# Patient Record
Sex: MALE | Race: ASIAN
[De-identification: names, ages, dates, MRNs, and addresses within clinical notes are randomized per-mention and may not be internally consistent; named-entity substitution may affect disease eponyms.]

---

## 2019-12-16 ENCOUNTER — APPOINTMENT (OUTPATIENT)
Dept: CARDIOLOGY | Facility: CLINIC | Age: 74
End: 2019-12-16
Payer: MEDICARE

## 2019-12-16 ENCOUNTER — NON-APPOINTMENT (OUTPATIENT)
Age: 74
End: 2019-12-16

## 2019-12-16 VITALS
HEART RATE: 97 BPM | RESPIRATION RATE: 17 BRPM | WEIGHT: 150 LBS | DIASTOLIC BLOOD PRESSURE: 82 MMHG | OXYGEN SATURATION: 95 % | SYSTOLIC BLOOD PRESSURE: 146 MMHG | TEMPERATURE: 98.7 F | BODY MASS INDEX: 25.61 KG/M2 | HEIGHT: 64 IN

## 2019-12-16 DIAGNOSIS — E11.9 TYPE 2 DIABETES MELLITUS W/OUT COMPLICATIONS: ICD-10-CM

## 2019-12-16 DIAGNOSIS — Z86.39 PERSONAL HISTORY OF OTHER ENDOCRINE, NUTRITIONAL AND METABOLIC DISEASE: ICD-10-CM

## 2019-12-16 DIAGNOSIS — Z01.810 ENCOUNTER FOR PREPROCEDURAL CARDIOVASCULAR EXAMINATION: ICD-10-CM

## 2019-12-16 PROCEDURE — 93306 TTE W/DOPPLER COMPLETE: CPT

## 2019-12-16 PROCEDURE — 93015 CV STRESS TEST SUPVJ I&R: CPT

## 2019-12-16 PROCEDURE — 99203 OFFICE O/P NEW LOW 30 MIN: CPT | Mod: 25

## 2019-12-16 PROCEDURE — 93000 ELECTROCARDIOGRAM COMPLETE: CPT | Mod: 59

## 2019-12-17 PROBLEM — E11.9 DM (DIABETES MELLITUS): Status: ACTIVE | Noted: 2019-12-17

## 2019-12-17 PROBLEM — Z86.39 HISTORY OF HYPERLIPIDEMIA: Status: RESOLVED | Noted: 2019-12-17 | Resolved: 2019-12-17

## 2019-12-17 PROBLEM — Z01.810 PRE-OPERATIVE CARDIOVASCULAR EXAMINATION: Status: ACTIVE | Noted: 2019-12-17

## 2019-12-17 RX ORDER — ATORVASTATIN CALCIUM 10 MG/1
10 TABLET, FILM COATED ORAL
Refills: 0 | Status: ACTIVE | COMMUNITY

## 2019-12-17 RX ORDER — ICOSAPENT ETHYL 1000 MG/1
1 CAPSULE ORAL
Refills: 0 | Status: ACTIVE | COMMUNITY

## 2019-12-17 RX ORDER — ASPIRIN ENTERIC COATED TABLETS 81 MG 81 MG/1
81 TABLET, DELAYED RELEASE ORAL
Refills: 0 | Status: ACTIVE | COMMUNITY

## 2019-12-17 RX ORDER — METFORMIN ER 500 MG 500 MG/1
500 TABLET ORAL
Refills: 0 | Status: ACTIVE | COMMUNITY

## 2019-12-17 NOTE — DISCUSSION/SUMMARY
[FreeTextEntry1] : 74 year-old male with DM presents for cardiac clearance prior to left hip replacement.  Patient reports no cardiac history.  Patient denies CP or SOB.  Patient reports occasional palpitations when anxious.  Patient reports h/o syncope in the distant past when he drank alcohol.\par \par (1) Cardiac clearance prior to left hip replacement - Patient reports no cardiac history.  Patient denies exertional symptoms.  Patient underwent an echocardiogram and it showed normal LV function without significant valvular pathology.  Patient underwent a treadmill stress test and completed 3 minutes of Taye protocol.  There was no ECG evidence of ischemia.  Patient is therefore cleared for surgery and anesthesia.  He may hold ASA 81 mg for 7 days prior to surgery.\par \par (2) Followup - as needed.

## 2019-12-17 NOTE — PHYSICAL EXAM
[General Appearance - Well Developed] : well developed [Normal Appearance] : normal appearance [Well Groomed] : well groomed [No Deformities] : no deformities [General Appearance - Well Nourished] : well nourished [General Appearance - In No Acute Distress] : no acute distress [Normal Conjunctiva] : the conjunctiva exhibited no abnormalities [Eyelids - No Xanthelasma] : the eyelids demonstrated no xanthelasmas [No Oral Pallor] : no oral pallor [Normal Oral Mucosa] : normal oral mucosa [No Oral Cyanosis] : no oral cyanosis [Normal Jugular Venous V Waves Present] : normal jugular venous V waves present [Normal Jugular Venous A Waves Present] : normal jugular venous A waves present [Heart Rate And Rhythm] : heart rate and rhythm were normal [No Jugular Venous Ventura A Waves] : no jugular venous ventura A waves [Murmurs] : no murmurs present [Heart Sounds] : normal S1 and S2 [Arterial Pulses Normal] : the arterial pulses were normal [Edema] : no peripheral edema present [Exaggerated Use Of Accessory Muscles For Inspiration] : no accessory muscle use [Respiration, Rhythm And Depth] : normal respiratory rhythm and effort [Auscultation Breath Sounds / Voice Sounds] : lungs were clear to auscultation bilaterally [Abdomen Soft] : soft [Abdomen Tenderness] : non-tender [Abdomen Mass (___ Cm)] : no abdominal mass palpated [Gait - Sufficient For Exercise Testing] : the gait was sufficient for exercise testing [Abnormal Walk] : normal gait [Cyanosis, Localized] : no localized cyanosis [Nail Clubbing] : no clubbing of the fingernails [] : no ischemic changes [Petechial Hemorrhages (___cm)] : no petechial hemorrhages [Oriented To Time, Place, And Person] : oriented to person, place, and time [Affect] : the affect was normal [Mood] : the mood was normal [No Anxiety] : not feeling anxious

## 2019-12-17 NOTE — HISTORY OF PRESENT ILLNESS
[FreeTextEntry1] : 74 year-old male with DM presents for cardiac clearance prior to left hip replacement.  Patient reports no cardiac history.  Patient denies CP or SOB.  Patient reports occasional palpitations when anxious.  Patient reports h/o syncope in the distant past when he drank alcohol.

## 2019-12-17 NOTE — REASON FOR VISIT
[Consultation] : a consultation regarding [FreeTextEntry1] : cardiac clearance prior to left hip replacement

## 2021-02-08 ENCOUNTER — APPOINTMENT (OUTPATIENT)
Dept: CARDIOLOGY | Facility: CLINIC | Age: 76
End: 2021-02-08
Payer: MEDICARE

## 2021-02-08 VITALS
OXYGEN SATURATION: 97 % | BODY MASS INDEX: 25.75 KG/M2 | RESPIRATION RATE: 18 BRPM | HEART RATE: 108 BPM | SYSTOLIC BLOOD PRESSURE: 129 MMHG | WEIGHT: 150 LBS | TEMPERATURE: 98.4 F | DIASTOLIC BLOOD PRESSURE: 85 MMHG

## 2021-02-08 DIAGNOSIS — R06.02 SHORTNESS OF BREATH: ICD-10-CM

## 2021-02-08 DIAGNOSIS — R00.2 PALPITATIONS: ICD-10-CM

## 2021-02-08 PROCEDURE — 99214 OFFICE O/P EST MOD 30 MIN: CPT

## 2021-02-08 PROCEDURE — 99072 ADDL SUPL MATRL&STAF TM PHE: CPT

## 2021-02-08 NOTE — PHYSICAL EXAM
[General Appearance - Well Developed] : well developed [Normal Appearance] : normal appearance [Well Groomed] : well groomed [General Appearance - Well Nourished] : well nourished [No Deformities] : no deformities [Normal Conjunctiva] : the conjunctiva exhibited no abnormalities [General Appearance - In No Acute Distress] : no acute distress [Eyelids - No Xanthelasma] : the eyelids demonstrated no xanthelasmas [Normal Oral Mucosa] : normal oral mucosa [No Oral Pallor] : no oral pallor [No Oral Cyanosis] : no oral cyanosis [Normal Jugular Venous A Waves Present] : normal jugular venous A waves present [Normal Jugular Venous V Waves Present] : normal jugular venous V waves present [No Jugular Venous Ventura A Waves] : no jugular venous ventura A waves [Respiration, Rhythm And Depth] : normal respiratory rhythm and effort [Exaggerated Use Of Accessory Muscles For Inspiration] : no accessory muscle use [Auscultation Breath Sounds / Voice Sounds] : lungs were clear to auscultation bilaterally [Heart Rate And Rhythm] : heart rate and rhythm were normal [Heart Sounds] : normal S1 and S2 [Murmurs] : no murmurs present [Arterial Pulses Normal] : the arterial pulses were normal [Edema] : no peripheral edema present [Abdomen Soft] : soft [Abdomen Tenderness] : non-tender [Abdomen Mass (___ Cm)] : no abdominal mass palpated [Abnormal Walk] : normal gait [Gait - Sufficient For Exercise Testing] : the gait was sufficient for exercise testing [Nail Clubbing] : no clubbing of the fingernails [Cyanosis, Localized] : no localized cyanosis [Petechial Hemorrhages (___cm)] : no petechial hemorrhages [] : no ischemic changes [Oriented To Time, Place, And Person] : oriented to person, place, and time [Affect] : the affect was normal [Mood] : the mood was normal [No Anxiety] : not feeling anxious

## 2021-02-08 NOTE — DISCUSSION/SUMMARY
[FreeTextEntry1] : 75 year-old male with DM presents for followup.  \par \par Patient was last seen on 12/16/19 for cardiac clearance prior to left hip replacement.  Patient underwent an echocardiogram and it showed normal LV function without significant valvular pathology.  Patient underwent a treadmill stress test and completed 3 minutes of Taye protocol.  There was no ECG evidence of ischemia. \par \par 2/8/21 - Patient reports that he has been experiencing dizziness in the mornings, described as spinning sensation like motion sickness.  Patient denies CP.  Patient reports NICOLE.  Patient reports occasional palpitations.  His ECG with PCP was interpreted by the machine as "atrial flutter" but it is actually sinus rhythm.  I advised patient to wear a Holter monitor for evaluation of his palpitations.  His dizziness is likely due to vestibular dysfunction.\par \par (1) Dizziness -  His ECG with PCP was interpreted by the machine as "atrial flutter" but it is actually sinus rhythm.  Patient was reassured.  His symptom s likely due to vestibular dysfunction. \par \par (2) Palpitations - I advised patient to wear a Holter monitor. \par \par (3) Followup - pending Holter.

## 2021-02-08 NOTE — HISTORY OF PRESENT ILLNESS
[FreeTextEntry1] : 75 year-old male with DM presents for followup.  \par \par Patient was last seen on 12/16/19 for cardiac clearance prior to left hip replacement.  Patient underwent an echocardiogram and it showed normal LV function without significant valvular pathology.  Patient underwent a treadmill stress test and completed 3 minutes of Taye protocol.  There was no ECG evidence of ischemia. \par \par 2/8/21 - Patient reports that he has been experiencing dizziness in the mornings, described as spinning sensation like motion sickness.  Patient denies CP.  Patient reports NICOLE.  Patient reports occasional palpitations.  Her ECG with PCP was interpreted by the machine as "atrial flutter" but it is actually sinus rhythm.  I advised patient to wear a Holter monitor for evaluation of his palpitations.  His dizziness is likely due to vestibular dysfunction.\par

## 2021-02-08 NOTE — REASON FOR VISIT
[Follow-Up - Clinic] : a clinic follow-up of [FreeTextEntry1] : 2/8/21 - Patient reports that he has been experiencing dizziness in the mornings, described as spinning sensation like motion sickness.  Patient denies CP.  Patient reports NICOLE.  Patient reports occasional palpitations.  His ECG with PCP was interpreted by the machine as "atrial flutter" but it is actually sinus rhythm.  I advised patient to wear a Holter monitor for evaluation of his palpitations.  His dizziness is likely due to vestibular dysfunction.\par \par 12/16/19 - Patient reports no cardiac history.  Patient denies CP or SOB.  Patient reports occasional palpitations when anxious.  Patient reports h/o syncope in the distant past when he drank alcohol.

## 2021-02-12 ENCOUNTER — NON-APPOINTMENT (OUTPATIENT)
Age: 76
End: 2021-02-12

## 2021-02-27 PROBLEM — R06.02 SOB (SHORTNESS OF BREATH): Status: ACTIVE | Noted: 2019-12-16

## 2023-04-06 ENCOUNTER — NON-APPOINTMENT (OUTPATIENT)
Age: 78
End: 2023-04-06

## 2023-05-16 ENCOUNTER — INPATIENT (INPATIENT)
Facility: HOSPITAL | Age: 78
LOS: 20 days | Discharge: NURSING, REHAB OR LONG-TERM CARE | DRG: 207 | End: 2023-06-06
Attending: SURGERY | Admitting: SURGERY
Payer: MEDICARE

## 2023-05-16 VITALS — TEMPERATURE: 98 F | RESPIRATION RATE: 20 BRPM | HEART RATE: 83 BPM | OXYGEN SATURATION: 100 %

## 2023-05-16 DIAGNOSIS — I21.3 ST ELEVATION (STEMI) MYOCARDIAL INFARCTION OF UNSPECIFIED SITE: ICD-10-CM

## 2023-05-16 LAB
ALBUMIN SERPL ELPH-MCNC: 3.1 G/DL — LOW (ref 3.3–5)
ALP SERPL-CCNC: 105 U/L — SIGNIFICANT CHANGE UP (ref 40–120)
ALT FLD-CCNC: 25 U/L — SIGNIFICANT CHANGE UP (ref 12–78)
ANION GAP SERPL CALC-SCNC: 4 MMOL/L — LOW (ref 5–17)
APPEARANCE UR: CLEAR — SIGNIFICANT CHANGE UP
APTT BLD: 55.4 SEC — HIGH (ref 27.5–35.5)
AST SERPL-CCNC: 37 U/L — SIGNIFICANT CHANGE UP (ref 15–37)
BACTERIA # UR AUTO: ABNORMAL
BASOPHILS # BLD AUTO: 0.08 K/UL — SIGNIFICANT CHANGE UP (ref 0–0.2)
BASOPHILS NFR BLD AUTO: 0.9 % — SIGNIFICANT CHANGE UP (ref 0–2)
BILIRUB SERPL-MCNC: 1.1 MG/DL — SIGNIFICANT CHANGE UP (ref 0.2–1.2)
BILIRUB UR-MCNC: NEGATIVE — SIGNIFICANT CHANGE UP
BUN SERPL-MCNC: 43 MG/DL — HIGH (ref 7–23)
CALCIUM SERPL-MCNC: 9.6 MG/DL — SIGNIFICANT CHANGE UP (ref 8.5–10.1)
CHLORIDE SERPL-SCNC: 113 MMOL/L — HIGH (ref 96–108)
CO2 SERPL-SCNC: 26 MMOL/L — SIGNIFICANT CHANGE UP (ref 22–31)
COLOR SPEC: YELLOW — SIGNIFICANT CHANGE UP
CREAT SERPL-MCNC: 1.51 MG/DL — HIGH (ref 0.5–1.3)
DIFF PNL FLD: ABNORMAL
EGFR: 48 ML/MIN/1.73M2 — LOW
EOSINOPHIL # BLD AUTO: 0.09 K/UL — SIGNIFICANT CHANGE UP (ref 0–0.5)
EOSINOPHIL NFR BLD AUTO: 1 % — SIGNIFICANT CHANGE UP (ref 0–6)
EPI CELLS # UR: NEGATIVE — SIGNIFICANT CHANGE UP
GLUCOSE SERPL-MCNC: 118 MG/DL — HIGH (ref 70–99)
GLUCOSE UR QL: NEGATIVE — SIGNIFICANT CHANGE UP
HCT VFR BLD CALC: 28.5 % — LOW (ref 39–50)
HGB BLD-MCNC: 8.8 G/DL — LOW (ref 13–17)
HIV 1 & 2 AB SERPL IA.RAPID: SIGNIFICANT CHANGE UP
IMM GRANULOCYTES NFR BLD AUTO: 0.3 % — SIGNIFICANT CHANGE UP (ref 0–0.9)
INR BLD: 0.97 RATIO — SIGNIFICANT CHANGE UP (ref 0.88–1.16)
KETONES UR-MCNC: NEGATIVE — SIGNIFICANT CHANGE UP
LEUKOCYTE ESTERASE UR-ACNC: ABNORMAL
LYMPHOCYTES # BLD AUTO: 3.46 K/UL — HIGH (ref 1–3.3)
LYMPHOCYTES # BLD AUTO: 38.7 % — SIGNIFICANT CHANGE UP (ref 13–44)
MAGNESIUM SERPL-MCNC: 1.9 MG/DL — SIGNIFICANT CHANGE UP (ref 1.6–2.6)
MCHC RBC-ENTMCNC: 30.9 GM/DL — LOW (ref 32–36)
MCHC RBC-ENTMCNC: 31.2 PG — SIGNIFICANT CHANGE UP (ref 27–34)
MCV RBC AUTO: 101.1 FL — HIGH (ref 80–100)
MONOCYTES # BLD AUTO: 0.96 K/UL — HIGH (ref 0–0.9)
MONOCYTES NFR BLD AUTO: 10.7 % — SIGNIFICANT CHANGE UP (ref 2–14)
NEUTROPHILS # BLD AUTO: 4.33 K/UL — SIGNIFICANT CHANGE UP (ref 1.8–7.4)
NEUTROPHILS NFR BLD AUTO: 48.4 % — SIGNIFICANT CHANGE UP (ref 43–77)
NITRITE UR-MCNC: NEGATIVE — SIGNIFICANT CHANGE UP
PH UR: 5 — SIGNIFICANT CHANGE UP (ref 5–8)
PHOSPHATE SERPL-MCNC: 2.8 MG/DL — SIGNIFICANT CHANGE UP (ref 2.5–4.5)
PLATELET # BLD AUTO: 217 K/UL — SIGNIFICANT CHANGE UP (ref 150–400)
POTASSIUM SERPL-MCNC: 4.7 MMOL/L — SIGNIFICANT CHANGE UP (ref 3.5–5.3)
POTASSIUM SERPL-SCNC: 4.7 MMOL/L — SIGNIFICANT CHANGE UP (ref 3.5–5.3)
PROT SERPL-MCNC: 6.9 GM/DL — SIGNIFICANT CHANGE UP (ref 6–8.3)
PROT UR-MCNC: NEGATIVE — SIGNIFICANT CHANGE UP
PROTHROM AB SERPL-ACNC: 11.3 SEC — SIGNIFICANT CHANGE UP (ref 10.5–13.4)
RBC # BLD: 2.82 M/UL — LOW (ref 4.2–5.8)
RBC # FLD: 16.3 % — HIGH (ref 10.3–14.5)
RBC CASTS # UR COMP ASSIST: SIGNIFICANT CHANGE UP /HPF (ref 0–4)
SODIUM SERPL-SCNC: 143 MMOL/L — SIGNIFICANT CHANGE UP (ref 135–145)
SP GR SPEC: 1.01 — SIGNIFICANT CHANGE UP (ref 1.01–1.02)
UROBILINOGEN FLD QL: NEGATIVE — SIGNIFICANT CHANGE UP
WBC # BLD: 8.95 K/UL — SIGNIFICANT CHANGE UP (ref 3.8–10.5)
WBC # FLD AUTO: 8.95 K/UL — SIGNIFICANT CHANGE UP (ref 3.8–10.5)
WBC UR QL: ABNORMAL /HPF (ref 0–5)

## 2023-05-16 PROCEDURE — 86803 HEPATITIS C AB TEST: CPT

## 2023-05-16 PROCEDURE — 85027 COMPLETE CBC AUTOMATED: CPT

## 2023-05-16 PROCEDURE — 93005 ELECTROCARDIOGRAM TRACING: CPT

## 2023-05-16 PROCEDURE — 82803 BLOOD GASES ANY COMBINATION: CPT

## 2023-05-16 PROCEDURE — 82728 ASSAY OF FERRITIN: CPT

## 2023-05-16 PROCEDURE — 87040 BLOOD CULTURE FOR BACTERIA: CPT

## 2023-05-16 PROCEDURE — 85610 PROTHROMBIN TIME: CPT

## 2023-05-16 PROCEDURE — 84466 ASSAY OF TRANSFERRIN: CPT

## 2023-05-16 PROCEDURE — 94799 UNLISTED PULMONARY SVC/PX: CPT

## 2023-05-16 PROCEDURE — 83540 ASSAY OF IRON: CPT

## 2023-05-16 PROCEDURE — 80048 BASIC METABOLIC PNL TOTAL CA: CPT

## 2023-05-16 PROCEDURE — 87086 URINE CULTURE/COLONY COUNT: CPT

## 2023-05-16 PROCEDURE — 88108 CYTOPATH CONCENTRATE TECH: CPT

## 2023-05-16 PROCEDURE — 84145 PROCALCITONIN (PCT): CPT

## 2023-05-16 PROCEDURE — 93308 TTE F-UP OR LMTD: CPT

## 2023-05-16 PROCEDURE — 83615 LACTATE (LD) (LDH) ENZYME: CPT

## 2023-05-16 PROCEDURE — 84100 ASSAY OF PHOSPHORUS: CPT

## 2023-05-16 PROCEDURE — 92610 EVALUATE SWALLOWING FUNCTION: CPT | Mod: GN

## 2023-05-16 PROCEDURE — C9113: CPT

## 2023-05-16 PROCEDURE — 82945 GLUCOSE OTHER FLUID: CPT

## 2023-05-16 PROCEDURE — 87070 CULTURE OTHR SPECIMN AEROBIC: CPT

## 2023-05-16 PROCEDURE — 94002 VENT MGMT INPAT INIT DAY: CPT

## 2023-05-16 PROCEDURE — 73630 X-RAY EXAM OF FOOT: CPT | Mod: RT

## 2023-05-16 PROCEDURE — 80074 ACUTE HEPATITIS PANEL: CPT

## 2023-05-16 PROCEDURE — 82962 GLUCOSE BLOOD TEST: CPT

## 2023-05-16 PROCEDURE — P9047: CPT

## 2023-05-16 PROCEDURE — 97116 GAIT TRAINING THERAPY: CPT | Mod: GP

## 2023-05-16 PROCEDURE — 82306 VITAMIN D 25 HYDROXY: CPT

## 2023-05-16 PROCEDURE — 89051 BODY FLUID CELL COUNT: CPT

## 2023-05-16 PROCEDURE — 84157 ASSAY OF PROTEIN OTHER: CPT

## 2023-05-16 PROCEDURE — 81001 URINALYSIS AUTO W/SCOPE: CPT

## 2023-05-16 PROCEDURE — 80053 COMPREHEN METABOLIC PANEL: CPT

## 2023-05-16 PROCEDURE — 71045 X-RAY EXAM CHEST 1 VIEW: CPT | Mod: 26

## 2023-05-16 PROCEDURE — 72193 CT PELVIS W/DYE: CPT

## 2023-05-16 PROCEDURE — 86703 HIV-1/HIV-2 1 RESULT ANTBDY: CPT

## 2023-05-16 PROCEDURE — 83605 ASSAY OF LACTIC ACID: CPT

## 2023-05-16 PROCEDURE — 87635 SARS-COV-2 COVID-19 AMP PRB: CPT

## 2023-05-16 PROCEDURE — 97530 THERAPEUTIC ACTIVITIES: CPT | Mod: GP

## 2023-05-16 PROCEDURE — 87077 CULTURE AEROBIC IDENTIFY: CPT

## 2023-05-16 PROCEDURE — 71250 CT THORAX DX C-: CPT

## 2023-05-16 PROCEDURE — 71045 X-RAY EXAM CHEST 1 VIEW: CPT

## 2023-05-16 PROCEDURE — 83550 IRON BINDING TEST: CPT

## 2023-05-16 PROCEDURE — 84484 ASSAY OF TROPONIN QUANT: CPT

## 2023-05-16 PROCEDURE — 94003 VENT MGMT INPAT SUBQ DAY: CPT

## 2023-05-16 PROCEDURE — 99223 1ST HOSP IP/OBS HIGH 75: CPT

## 2023-05-16 PROCEDURE — 85730 THROMBOPLASTIN TIME PARTIAL: CPT

## 2023-05-16 PROCEDURE — 82746 ASSAY OF FOLIC ACID SERUM: CPT

## 2023-05-16 PROCEDURE — 36415 COLL VENOUS BLD VENIPUNCTURE: CPT

## 2023-05-16 PROCEDURE — C1751: CPT

## 2023-05-16 PROCEDURE — 87075 CULTR BACTERIA EXCEPT BLOOD: CPT

## 2023-05-16 PROCEDURE — 83986 ASSAY PH BODY FLUID NOS: CPT

## 2023-05-16 PROCEDURE — 87186 SC STD MICRODIL/AGAR DIL: CPT

## 2023-05-16 PROCEDURE — 82550 ASSAY OF CK (CPK): CPT

## 2023-05-16 PROCEDURE — 83735 ASSAY OF MAGNESIUM: CPT

## 2023-05-16 PROCEDURE — 92526 ORAL FUNCTION THERAPY: CPT | Mod: GN

## 2023-05-16 PROCEDURE — 82042 OTHER SOURCE ALBUMIN QUAN EA: CPT

## 2023-05-16 PROCEDURE — 87102 FUNGUS ISOLATION CULTURE: CPT

## 2023-05-16 PROCEDURE — 97163 PT EVAL HIGH COMPLEX 45 MIN: CPT | Mod: GP

## 2023-05-16 PROCEDURE — 76700 US EXAM ABDOM COMPLETE: CPT

## 2023-05-16 PROCEDURE — 93306 TTE W/DOPPLER COMPLETE: CPT

## 2023-05-16 PROCEDURE — 85025 COMPLETE CBC W/AUTO DIFF WBC: CPT

## 2023-05-16 PROCEDURE — 82607 VITAMIN B-12: CPT

## 2023-05-16 PROCEDURE — 36600 WITHDRAWAL OF ARTERIAL BLOOD: CPT

## 2023-05-16 RX ORDER — HEPARIN SODIUM 5000 [USP'U]/ML
5000 INJECTION INTRAVENOUS; SUBCUTANEOUS EVERY 12 HOURS
Refills: 0 | Status: DISCONTINUED | OUTPATIENT
Start: 2023-05-17 | End: 2023-06-06

## 2023-05-16 RX ORDER — CHOLECALCIFEROL (VITAMIN D3) 125 MCG
2000 CAPSULE ORAL DAILY
Refills: 0 | Status: DISCONTINUED | OUTPATIENT
Start: 2023-05-16 | End: 2023-06-06

## 2023-05-16 RX ORDER — ASCORBIC ACID 60 MG
500 TABLET,CHEWABLE ORAL DAILY
Refills: 0 | Status: DISCONTINUED | OUTPATIENT
Start: 2023-05-16 | End: 2023-06-06

## 2023-05-16 RX ORDER — CLOPIDOGREL BISULFATE 75 MG/1
75 TABLET, FILM COATED ORAL DAILY
Refills: 0 | Status: DISCONTINUED | OUTPATIENT
Start: 2023-05-17 | End: 2023-06-06

## 2023-05-16 RX ORDER — CEFTRIAXONE 500 MG/1
1000 INJECTION, POWDER, FOR SOLUTION INTRAMUSCULAR; INTRAVENOUS EVERY 24 HOURS
Refills: 0 | Status: COMPLETED | OUTPATIENT
Start: 2023-05-16 | End: 2023-05-18

## 2023-05-16 RX ORDER — OXYCODONE HYDROCHLORIDE 5 MG/1
2.5 TABLET ORAL EVERY 6 HOURS
Refills: 0 | Status: DISCONTINUED | OUTPATIENT
Start: 2023-05-16 | End: 2023-05-22

## 2023-05-16 RX ORDER — PANTOPRAZOLE SODIUM 20 MG/1
40 TABLET, DELAYED RELEASE ORAL DAILY
Refills: 0 | Status: DISCONTINUED | OUTPATIENT
Start: 2023-05-16 | End: 2023-06-06

## 2023-05-16 RX ORDER — CHLORHEXIDINE GLUCONATE 213 G/1000ML
15 SOLUTION TOPICAL EVERY 12 HOURS
Refills: 0 | Status: DISCONTINUED | OUTPATIENT
Start: 2023-05-16 | End: 2023-06-01

## 2023-05-16 RX ORDER — QUETIAPINE FUMARATE 200 MG/1
50 TABLET, FILM COATED ORAL AT BEDTIME
Refills: 0 | Status: DISCONTINUED | OUTPATIENT
Start: 2023-05-16 | End: 2023-06-06

## 2023-05-16 RX ORDER — CHLORHEXIDINE GLUCONATE 213 G/1000ML
1 SOLUTION TOPICAL
Refills: 0 | Status: DISCONTINUED | OUTPATIENT
Start: 2023-05-16 | End: 2023-05-17

## 2023-05-16 RX ORDER — AMIODARONE HYDROCHLORIDE 400 MG/1
200 TABLET ORAL DAILY
Refills: 0 | Status: DISCONTINUED | OUTPATIENT
Start: 2023-05-17 | End: 2023-06-06

## 2023-05-16 RX ORDER — LANOLIN ALCOHOL/MO/W.PET/CERES
5 CREAM (GRAM) TOPICAL AT BEDTIME
Refills: 0 | Status: DISCONTINUED | OUTPATIENT
Start: 2023-05-16 | End: 2023-06-06

## 2023-05-16 RX ORDER — NYSTATIN CREAM 100000 [USP'U]/G
1 CREAM TOPICAL
Refills: 0 | Status: DISCONTINUED | OUTPATIENT
Start: 2023-05-16 | End: 2023-06-06

## 2023-05-16 RX ORDER — CEFTRIAXONE 500 MG/1
1000 INJECTION, POWDER, FOR SOLUTION INTRAMUSCULAR; INTRAVENOUS EVERY 24 HOURS
Refills: 0 | Status: DISCONTINUED | OUTPATIENT
Start: 2023-05-16 | End: 2023-05-16

## 2023-05-16 RX ORDER — MIDODRINE HYDROCHLORIDE 2.5 MG/1
10 TABLET ORAL EVERY 8 HOURS
Refills: 0 | Status: DISCONTINUED | OUTPATIENT
Start: 2023-05-16 | End: 2023-05-23

## 2023-05-16 RX ADMIN — CHLORHEXIDINE GLUCONATE 15 MILLILITER(S): 213 SOLUTION TOPICAL at 22:08

## 2023-05-16 RX ADMIN — Medication 5 MILLIGRAM(S): at 22:09

## 2023-05-16 RX ADMIN — CEFTRIAXONE 1000 MILLIGRAM(S): 500 INJECTION, POWDER, FOR SOLUTION INTRAMUSCULAR; INTRAVENOUS at 22:08

## 2023-05-16 RX ADMIN — MIDODRINE HYDROCHLORIDE 10 MILLIGRAM(S): 2.5 TABLET ORAL at 22:07

## 2023-05-16 RX ADMIN — QUETIAPINE FUMARATE 50 MILLIGRAM(S): 200 TABLET, FILM COATED ORAL at 22:07

## 2023-05-16 RX ADMIN — NYSTATIN CREAM 1 APPLICATION(S): 100000 CREAM TOPICAL at 22:08

## 2023-05-16 NOTE — H&P ADULT - NSHPPHYSICALEXAM_GEN_ALL_CORE
Frail and cachectic  S/p trach, peg  poorly responsive, received sedation enroute  bilat air entry  pressure injury around scrotum  trace edema

## 2023-05-16 NOTE — CHART NOTE - NSCHARTNOTEFT_GEN_A_CORE
I spoke with patient's family at bedside. Wife, and Two sons Mohan [phone] & Freddie [phone]. Patient initially had a DNR order from [place] and I was speaking with the family regarding a MOLST in order to detail patient care wishes.     Family stated that they initially signed the DNR when the patient was in a less medically stable state and that before filling out a new form they would like to speak to the rest of the family. I went over the MOLST form with them and gave them a copy to take home to review. In the meantime patient is full code until family discusses further goals of care.     Marques is the primary contact

## 2023-05-16 NOTE — H&P ADULT - ASSESSMENT
78 yo male with hx of DM, HTN, HLD, s/p STEMI complicated by cardiogenic shock, BARBRA requiring dialysis, acute respiratory failure requiring trach/peg.  Transported from [place] to [place] at family request.    Plan:  - Labs ordered, CXR to confirm placement of PICC and tunneled catheter  - Blood, urine, sputum cultures  - Echo, EKG  - Continue meds from prior hospitalization  - DVT prophylaxis, GI prophylaxis  Son updated

## 2023-05-16 NOTE — H&P ADULT - TIME BILLING
coordinating care with ICU RN, reviewing labs and prior records, personally reviewing and interpreting imaging, documenting findings and assessment in the medical record, obtaining additional hx from son at bedside.

## 2023-05-17 LAB
ANION GAP SERPL CALC-SCNC: 6 MMOL/L — SIGNIFICANT CHANGE UP (ref 5–17)
BUN SERPL-MCNC: 43 MG/DL — HIGH (ref 7–23)
CALCIUM SERPL-MCNC: 9.4 MG/DL — SIGNIFICANT CHANGE UP (ref 8.5–10.1)
CHLORIDE SERPL-SCNC: 114 MMOL/L — HIGH (ref 96–108)
CO2 SERPL-SCNC: 25 MMOL/L — SIGNIFICANT CHANGE UP (ref 22–31)
CREAT SERPL-MCNC: 1.36 MG/DL — HIGH (ref 0.5–1.3)
EGFR: 54 ML/MIN/1.73M2 — LOW
GLUCOSE SERPL-MCNC: 142 MG/DL — HIGH (ref 70–99)
GRAM STN FLD: SIGNIFICANT CHANGE UP
HCT VFR BLD CALC: 28.3 % — LOW (ref 39–50)
HCV AB S/CO SERPL IA: 0.12 S/CO — SIGNIFICANT CHANGE UP (ref 0–0.99)
HCV AB SERPL-IMP: SIGNIFICANT CHANGE UP
HGB BLD-MCNC: 8.6 G/DL — LOW (ref 13–17)
MAGNESIUM SERPL-MCNC: 1.9 MG/DL — SIGNIFICANT CHANGE UP (ref 1.6–2.6)
MCHC RBC-ENTMCNC: 30.4 GM/DL — LOW (ref 32–36)
MCHC RBC-ENTMCNC: 30.8 PG — SIGNIFICANT CHANGE UP (ref 27–34)
MCV RBC AUTO: 101.4 FL — HIGH (ref 80–100)
PHOSPHATE SERPL-MCNC: 2.8 MG/DL — SIGNIFICANT CHANGE UP (ref 2.5–4.5)
PLATELET # BLD AUTO: 221 K/UL — SIGNIFICANT CHANGE UP (ref 150–400)
POTASSIUM SERPL-MCNC: 4.5 MMOL/L — SIGNIFICANT CHANGE UP (ref 3.5–5.3)
POTASSIUM SERPL-SCNC: 4.5 MMOL/L — SIGNIFICANT CHANGE UP (ref 3.5–5.3)
PROCALCITONIN SERPL-MCNC: 0.24 NG/ML — HIGH (ref 0.02–0.1)
RBC # BLD: 2.79 M/UL — LOW (ref 4.2–5.8)
RBC # FLD: 16.5 % — HIGH (ref 10.3–14.5)
SODIUM SERPL-SCNC: 145 MMOL/L — SIGNIFICANT CHANGE UP (ref 135–145)
SPECIMEN SOURCE: SIGNIFICANT CHANGE UP
WBC # BLD: 8.46 K/UL — SIGNIFICANT CHANGE UP (ref 3.8–10.5)
WBC # FLD AUTO: 8.46 K/UL — SIGNIFICANT CHANGE UP (ref 3.8–10.5)

## 2023-05-17 PROCEDURE — 93010 ELECTROCARDIOGRAM REPORT: CPT

## 2023-05-17 PROCEDURE — 73630 X-RAY EXAM OF FOOT: CPT | Mod: 26,RT

## 2023-05-17 PROCEDURE — 99222 1ST HOSP IP/OBS MODERATE 55: CPT

## 2023-05-17 PROCEDURE — 93306 TTE W/DOPPLER COMPLETE: CPT | Mod: 26

## 2023-05-17 PROCEDURE — 99291 CRITICAL CARE FIRST HOUR: CPT

## 2023-05-17 RX ORDER — CADEXOMER IODINE 0.9 %
1 PADS, MEDICATED (EA) TOPICAL DAILY
Refills: 0 | Status: DISCONTINUED | OUTPATIENT
Start: 2023-05-17 | End: 2023-06-06

## 2023-05-17 RX ORDER — ALBUMIN HUMAN 25 %
50 VIAL (ML) INTRAVENOUS EVERY 6 HOURS
Refills: 0 | Status: COMPLETED | OUTPATIENT
Start: 2023-05-17 | End: 2023-05-19

## 2023-05-17 RX ORDER — FUROSEMIDE 40 MG
20 TABLET ORAL ONCE
Refills: 0 | Status: COMPLETED | OUTPATIENT
Start: 2023-05-17 | End: 2023-05-17

## 2023-05-17 RX ORDER — CHLORHEXIDINE GLUCONATE 213 G/1000ML
1 SOLUTION TOPICAL
Refills: 0 | Status: DISCONTINUED | OUTPATIENT
Start: 2023-05-17 | End: 2023-06-05

## 2023-05-17 RX ORDER — THIAMINE MONONITRATE (VIT B1) 100 MG
100 TABLET ORAL DAILY
Refills: 0 | Status: DISCONTINUED | OUTPATIENT
Start: 2023-05-17 | End: 2023-06-06

## 2023-05-17 RX ORDER — COLLAGENASE CLOSTRIDIUM HIST. 250 UNIT/G
1 OINTMENT (GRAM) TOPICAL DAILY
Refills: 0 | Status: DISCONTINUED | OUTPATIENT
Start: 2023-05-17 | End: 2023-06-06

## 2023-05-17 RX ADMIN — CHLORHEXIDINE GLUCONATE 15 MILLILITER(S): 213 SOLUTION TOPICAL at 15:44

## 2023-05-17 RX ADMIN — OXYCODONE HYDROCHLORIDE 2.5 MILLIGRAM(S): 5 TABLET ORAL at 23:20

## 2023-05-17 RX ADMIN — CEFTRIAXONE 1000 MILLIGRAM(S): 500 INJECTION, POWDER, FOR SOLUTION INTRAMUSCULAR; INTRAVENOUS at 22:45

## 2023-05-17 RX ADMIN — Medication 2000 UNIT(S): at 10:57

## 2023-05-17 RX ADMIN — OXYCODONE HYDROCHLORIDE 2.5 MILLIGRAM(S): 5 TABLET ORAL at 10:30

## 2023-05-17 RX ADMIN — QUETIAPINE FUMARATE 50 MILLIGRAM(S): 200 TABLET, FILM COATED ORAL at 22:45

## 2023-05-17 RX ADMIN — PANTOPRAZOLE SODIUM 40 MILLIGRAM(S): 20 TABLET, DELAYED RELEASE ORAL at 10:58

## 2023-05-17 RX ADMIN — MIDODRINE HYDROCHLORIDE 10 MILLIGRAM(S): 2.5 TABLET ORAL at 22:45

## 2023-05-17 RX ADMIN — NYSTATIN CREAM 1 APPLICATION(S): 100000 CREAM TOPICAL at 22:46

## 2023-05-17 RX ADMIN — Medication 20 MILLIGRAM(S): at 10:58

## 2023-05-17 RX ADMIN — HEPARIN SODIUM 5000 UNIT(S): 5000 INJECTION INTRAVENOUS; SUBCUTANEOUS at 22:46

## 2023-05-17 RX ADMIN — Medication 50 MILLILITER(S): at 15:42

## 2023-05-17 RX ADMIN — AMIODARONE HYDROCHLORIDE 200 MILLIGRAM(S): 400 TABLET ORAL at 10:58

## 2023-05-17 RX ADMIN — MIDODRINE HYDROCHLORIDE 10 MILLIGRAM(S): 2.5 TABLET ORAL at 05:45

## 2023-05-17 RX ADMIN — OXYCODONE HYDROCHLORIDE 2.5 MILLIGRAM(S): 5 TABLET ORAL at 10:57

## 2023-05-17 RX ADMIN — MIDODRINE HYDROCHLORIDE 10 MILLIGRAM(S): 2.5 TABLET ORAL at 15:44

## 2023-05-17 RX ADMIN — CHLORHEXIDINE GLUCONATE 15 MILLILITER(S): 213 SOLUTION TOPICAL at 22:46

## 2023-05-17 RX ADMIN — Medication 500 MILLIGRAM(S): at 10:58

## 2023-05-17 RX ADMIN — Medication 50 MILLILITER(S): at 23:04

## 2023-05-17 RX ADMIN — NYSTATIN CREAM 1 APPLICATION(S): 100000 CREAM TOPICAL at 17:48

## 2023-05-17 RX ADMIN — HEPARIN SODIUM 5000 UNIT(S): 5000 INJECTION INTRAVENOUS; SUBCUTANEOUS at 10:58

## 2023-05-17 RX ADMIN — OXYCODONE HYDROCHLORIDE 2.5 MILLIGRAM(S): 5 TABLET ORAL at 22:45

## 2023-05-17 RX ADMIN — CLOPIDOGREL BISULFATE 75 MILLIGRAM(S): 75 TABLET, FILM COATED ORAL at 10:57

## 2023-05-17 RX ADMIN — Medication 1 APPLICATION(S): at 17:49

## 2023-05-17 RX ADMIN — Medication 5 MILLIGRAM(S): at 22:45

## 2023-05-17 RX ADMIN — CHLORHEXIDINE GLUCONATE 1 APPLICATION(S): 213 SOLUTION TOPICAL at 06:00

## 2023-05-17 NOTE — PHYSICAL THERAPY INITIAL EVALUATION ADULT - PERTINENT HX OF CURRENT PROBLEM, REHAB EVAL
78 yo male with hx of DM, HTN, HLD, who presented initially to an outside hospital in [place], [place] on 3/1/23 with chest pain and lightheadedness after doing yard work, found to have an inferiolateral ST elevations.  He was taken to the cath lab and underwent PCI with MEDINA placement to mLCX (Resolute Ever [place] 4 x 26mm), dLAD (Resolute Ever [place] 2.5 x 15mm and 2.25 x 12mm), and Impella CP was placed. Patient was cannulated for peripheral VA ECMO on 3/2 and decannulated on 3/9.  Impella was removed 3/10.  Hospital course was complicated by multiple watershed strokes, acute respiratory failure requiring tracheostomy, BARBRA requiring renal replacement therapy, upper GI bleed, massive hemoptysis originating from lingula requiring bronchial blocker and bronchial artery embolization, pseudomonas bacteremia and septic shock.    Patient was transported from [facility] on 5/16 to [facility] CCU with a tracheostomy, PEG, tunneled right sided HD catheter, right sided PICC line, rectal tube, and ostomy bag over his penis for urine collection.

## 2023-05-17 NOTE — DIETITIAN INITIAL EVALUATION ADULT - ENTERAL
1. Initiate Vital 1.5 @ 55 cc/hr (total volume = 1100 mL) with 2 packets of Prosource TF. Will provide ~ 1730 kcal, 96 g protein, and 840 mL free water.   2. Consider free water flushes to maintain hydration prn as per MD

## 2023-05-17 NOTE — DIETITIAN INITIAL EVALUATION ADULT - NS FNS DIET ORDER
Diet, NPO with Tube Feed:   Tube Feeding Modality: Gastrostomy  Vital 1.5 [place] (VITAL1.5)  Total Volume for 24 Hours (mL): 1320  Continuous  Until Goal Tube Feed Rate (mL per Hour): 55  Tube Feed Duration (in Hours): 24  Tube Feed Start Time: 12:00  Free Water Flush  Free Water Flush Instructions:  consider free water flushes to maintain hydration as per MD Isaacs Carb Prosource TF     Qty per Day:  2 (05-17-23 @ 11:59)

## 2023-05-17 NOTE — PHYSICAL THERAPY INITIAL EVALUATION ADULT - GENERAL OBSERVATIONS, REHAB EVAL
Pt seen for 30min PT Eval. Pt rec'd semi supine in bed in NAD in CCU monitoring , +dressing to Rt foot, +PEG, +rectal tube, +condom cath, +trach Collar, family bedside. pt trans supine>sit with mod AX2,

## 2023-05-17 NOTE — DIETITIAN INITIAL EVALUATION ADULT - NSFNSPHYEXAMSKINFT_GEN_A_CORE
Gordon score = 12, redness nonblanchable skin  Pressure Injury 1: coccyx, Stage II  Pressure Injury 2: Left:, greater trochanter (hip), Stage II

## 2023-05-17 NOTE — PROGRESS NOTE ADULT - ASSESSMENT
76 y/o male with PMH HTN, HLD, DM2, recent prolonged hospital course in [place], [place] after a IWMI (underwent PCI with MEDINA to LCx) complicated by cardiogenic shock requiring ECMO, acute hypoxic resp failure requiring trach, dysphagia requiring PEG, BARBRA requiring dialysis, pA.fib, acute CVA, septic shock from Pseudomonas bacteremia, hemoptysis, GI bleed     Now transferred to  for further care       Active problems are:     Acute hypoxic resp failure   BARBRA   Suspected UTI   R big toe wound, possible cellulitis, abscess, OM  Scrotal necrosis   Severe protein calorie malnutrition       Appears cachectic and not well overall       Recs:     BP low normal, continue midodrine  Hold BB   Amio   Continue Plavix, not on aspirin likely due to recent GI bleed   EKG 5/16 without ischemic changes   Continue ceftriaxone, will do a short course if cultures neg   Consult podiatry and    Start TF   Give Lasix 20 mg x 1, hold off on dialysis  Trach collar during the day if tolerates   Pain mx with oxycodone   QHS Seroquel     Prognosis poor     Son and wife updated at bedside  78 y/o male with PMH HTN, HLD, DM2, recent prolonged hospital course in [place], [place] after a IWMI (underwent PCI with MEDINA to LCx) complicated by cardiogenic shock requiring ECMO, acute hypoxic resp failure requiring trach, dysphagia requiring PEG, BARBRA requiring dialysis, pA.fib, acute CVA, septic shock from Pseudomonas bacteremia, hemoptysis, GI bleed     Now transferred to  for further care       Active problems are:     Acute hypoxic resp failure   BARBRA   Suspected UTI   R big toe wound, possible cellulitis, abscess, OM  Scrotal necrosis   Severe protein calorie malnutrition       Appears cachectic and not well overall       Recs:     BP low normal, continue midodrine  Hold BB   Amio   Continue Plavix, not on aspirin likely due to recent GI bleed   EKG 5/16 without ischemic changes   Continue ceftriaxone, will do a short course if cultures neg   Seen by wound care RN   Consult podiatry and    Start TF   Give Lasix 20 mg x 1, hold off on dialysis  Trach collar during the day if tolerates   Pain mx with oxycodone   QHS Seroquel   Maintain RIJ Permacath  RUE PICC removed   No Recio   Rectal tube in place     Prognosis poor     Son and wife updated at bedside  78 y/o male with PMH HTN, HLD, DM2, recent prolonged hospital course in [place], [place] after a IWMI (underwent PCI with MEDINA to LCx) complicated by cardiogenic shock requiring ECMO, acute hypoxic resp failure requiring trach, dysphagia requiring PEG, BARBRA requiring dialysis, pA.fib, acute CVA, septic shock from Pseudomonas bacteremia, hemoptysis, GI bleed     Now transferred to  for further care       Active problems are:     Acute hypoxic resp failure   BARBRA   Suspected UTI   R big toe wound, possible cellulitis, abscess, OM  Scrotal necrosis   Severe protein calorie malnutrition       Appears cachectic and not well overall       Recs:     BP low normal, continue midodrine  Hold BB   Amio   Continue Plavix, not on aspirin likely due to recent GI bleed   EKG 5/16 without ischemic changes   Continue ceftriaxone, will do a short course if cultures neg   Seen by wound care RN   Consult podiatry and    Start TF   Give Lasix 20 mg x 1, hold off on dialysis  Trach collar during the day if tolerates   Pain mx with oxycodone   QHS Seroquel   Maintain RIJ Permacath  RUE PICC removed   No Recio   Rectal tube in place     Prognosis poor     Son and wife updated at bedside     [facility] - Freddie [place] [phone], Vivek Kinjal [phone]

## 2023-05-17 NOTE — CONSULT NOTE ADULT - ASSESSMENT
80 yo male with extended complicated stay at [facility] , resides in [place], [place] .   pt w hx of HTN, DM, w 3/1/23 - STEMI required cath PCI to mLCCx and and dLAD required Impellar and ECMO.  pt was in BARBRA required CRRT and then transitioned to iHD since then TTS schedule but Cr been stable here .  per family last HD 5/10 , pt has TDC in place    BARBRA likey ATN related on HD - now possible recovery -    monitor labs and if Cr continues to be stable and making good UOP would DC TDC in next 48 hrs   monitor UOP    bladder scan    daily labs   no IV contrast or NSAID    Cr stable, acid base stable - no acute indication for HD at this time       Hypernatremia   free water flushes via PEG       d/w CCU RN, Dr Mena   d/w son Marques via phone as above    Thank you for the courtesy of this consult. We will follow this patient with you.   Management is subject to change if new information becomes available or patient condition changes.

## 2023-05-17 NOTE — PROGRESS NOTE ADULT - SUBJECTIVE AND OBJECTIVE BOX
Patient is a 77y old  Male who presents with a chief complaint of Transfer from [facility] [place] [facility] (17 May 2023 08:35)    24 hour events:     Allergies    No Known Allergies    Intolerances      REVIEW OF SYSTEMS: SEE BELOW       ICU Vital Signs Last 24 Hrs  T(C): 36.8 (17 May 2023 08:00), Max: 36.8 (16 May 2023 16:32)  T(F): 98.3 (17 May 2023 08:00), Max: 98.3 (17 May 2023 08:00)  HR: 105 (17 May 2023 09:00) (72 - 105)  BP: 124/62 (17 May 2023 09:00) (87/46 - 124/62)  BP(mean): 77 (17 May 2023 09:00) (56 - 81)  ABP: --  ABP(mean): --  RR: 23 (17 May 2023 09:00) (18 - 23)  SpO2: 100% (17 May 2023 09:00) (100% - 100%)    O2 Parameters below as of 17 May 2023 06:00  Patient On (Oxygen Delivery Method): ventilator            CAPILLARY BLOOD GLUCOSE          I&O's Summary    16 May 2023 07:01  -  17 May 2023 07:00  --------------------------------------------------------  IN: 410 mL / OUT: 100 mL / NET: 310 mL        Mode: AC/ CMV (Assist Control/ Continuous Mandatory Ventilation)  RR (machine): 20  TV (machine): 410  FiO2: 35  PEEP: 5  ITime: 0.8  MAP: 9  PIP: 19      MEDICATIONS  (STANDING):  aMIOdarone    Tablet 200 milliGRAM(s) Oral daily  ascorbic acid 500 milliGRAM(s) Oral daily  cefTRIAXone Injectable. 1000 milliGRAM(s) IV Push every 24 hours  chlorhexidine 0.12% Liquid 15 milliLiter(s) Oral Mucosa every 12 hours  chlorhexidine 4% Liquid 1 Application(s) Topical <User Schedule>  cholecalciferol 2000 Unit(s) Oral daily  clopidogrel Tablet 75 milliGRAM(s) Enteral Tube daily  collagenase Ointment 1 Application(s) Topical daily  heparin   Injectable 5000 Unit(s) SubCutaneous every 12 hours  melatonin 5 milliGRAM(s) Oral at bedtime  midodrine 10 milliGRAM(s) Oral every 8 hours  nystatin Powder 1 Application(s) Topical two times a day  pantoprazole  Injectable 40 milliGRAM(s) IV Push daily  QUEtiapine 50 milliGRAM(s) Oral at bedtime      MEDICATIONS  (PRN):  oxyCODONE    IR 2.5 milliGRAM(s) Oral every 6 hours PRN moderate to severe pain      PHYSICAL EXAM: SEE BELOW                          8.6    8.46  )-----------( 221      ( 17 May 2023 05:23 )             28.3       05-17    145  |  114<H>  |  43<H>  ----------------------------<  142<H>  4.5   |  25  |  1.36<H>    Ca    9.4      17 May 2023 05:23  Phos  2.8     05-17  Mg     1.9     -    TPro  6.9  /  Alb  3.1<L>  /  TBili  1.1  /  DBili  x   /  AST  37  /  ALT  25  /  AlkPhos  105  05-16          PT/INR - ( 16 May 2023 16:53 )   PT: 11.3 sec;   INR: 0.97 ratio         PTT - ( 16 May 2023 16:53 )  PTT:55.4 sec  Urinalysis Basic - ( 16 May 2023 18:00 )    Color: Yellow / Appearance: Clear / S.015 / pH: x  Gluc: x / Ketone: Negative  / Bili: Negative / Urobili: Negative   Blood: x / Protein: Negative / Nitrite: Negative   Leuk Esterase: Moderate / RBC: 0-2 /HPF / WBC 6-10 /HPF   Sq Epi: x / Non Sq Epi: x / Bacteria: TNTC

## 2023-05-17 NOTE — DIETITIAN INITIAL EVALUATION ADULT - NSFNSGIIOFT_GEN_A_CORE
05-16-23 @ 07:01  -  05-17-23 @ 07:00  --------------------------------------------------------  OUT:    Rectal Tube (mL): 100 mL  Total OUT: 100 mL    Total NET: 210 mL

## 2023-05-17 NOTE — DIETITIAN INITIAL EVALUATION ADULT - PERTINENT LABORATORY DATA
05-17    145  |  114<H>  |  43<H>  ----------------------------<  142<H>  4.5   |  25  |  1.36<H>    Ca    9.4      17 May 2023 05:23  Phos  2.8     05-17  Mg     1.9     05-17    TPro  6.9  /  Alb  3.1<L>  /  TBili  1.1  /  DBili  x   /  AST  37  /  ALT  25  /  AlkPhos  105  05-16

## 2023-05-17 NOTE — DIETITIAN INITIAL EVALUATION ADULT - OTHER INFO
76 y/o male with hx of DM, HTN, HLD, who presented initially to an outside hospital in [place], [place] on 3/1/23 with chest pain and lightheadedness after doing yard work, found to have an inferolateral ST elevations. He was taken to the cath lab and underwent PCI with MEDINA placement to mLCX (Resolute [place] 4 x 26mm), dLAD (Resolute Ever [place] 2.5 x 15mm and 2.25 x 12mm), and Impella CP was placed. Patient was cannulated for peripheral VA ECMO on 3/2 and decannulated on 3/9. Impella was removed 3/10.  Hospital course was complicated by multiple watershed strokes, acute respiratory failure requiring tracheostomy, BARBRA requiring renal replacement therapy, upper GI bleed, massive hemoptysis originating from lingula requiring bronchial blocker and bronchial artery embolization, pseudomonas bacteremia and septic shock. Patient was eventually transported from [facility] on 5/16 to [facility] CCU with a tracheostomy, PEG, tunneled right sided HD catheter, right sided PICC line, rectal tube, and ostomy bag over his penis for urine collection.       Unable to obtain info upon assessment 2/2 trach/PEG and AMS. No wt hx to review. Unable to obtain bed scale wt upon assessment. Admit wt doc'd at 117# - moderate generalized edema is skewing current wt appearance. No ht info doc'd in chart. Appeared very thin, frail, bony, emaciated, and malnourished. NFPE reveals severe muscle/fat wasting. Noted w/ wounds and stage II PI's. Currently on Vital 1.5 TF @ 50 cc/hr (goal rate) which is providing 1500 kcal, 67.5 gm protein, 764 mL free water. TF currently not meeting needs, see below for updated TF recs.

## 2023-05-17 NOTE — CHART NOTE - NSCHARTNOTEFT_GEN_A_CORE
Pt checked and found on 40% T/C tolerating well at this time.   RR26  SpO2 94%. Pt checked and found on 40% T/C tolerating well at this time.   RR26  SpO2 94%.  Vent remains stand by at bedside.

## 2023-05-17 NOTE — DIETITIAN INITIAL EVALUATION ADULT - ADD RECOMMEND
1) Change TF rx to above  2) Monitor tolerance. Maintain aspiration precautions, keep back of bed >45 degrees.   3) Monitor daily wt to track/trend changes; monitor I/Os   4) Monitor daily lytes/min and replete prn   5) Monitor bowel movements, if no BM for >3 days, consider implementing bowel regimen.  6) Consider checking B6, B12, thiamine, folate, carnitine, and copper levels as malnutrition in cause these to be deficient   RD will continue to monitor TF tolerance, labs, hydration, and wt prn.

## 2023-05-17 NOTE — PROGRESS NOTE ADULT - SUBJECTIVE AND OBJECTIVE BOX
Patient is a 77y old  Male who presents with a chief complaint of Transfer from [facility] (17 May 2023 16:12)      BRIEF HOSPITAL COURSE:   78 yo m pmhx Dm2, HTN, HLD [facility] hospital course for STEMI s/p MEDINA to mLCX and dLAD requiring impella complicated by cardiogenic shock requiring VA ECMO, respiratory failure requiring trach, BARBRA requiring HD, UGIB, massive hemoptysis originating from lingula s/p bronchial blocker and bronchial artery emobolization, pseudomonas bacteremia and septic shock.  Ultimately improved, minimal vent settings, no longer requiring HD, HD stable and was air transported to  with tentative plan for facility transfer.      Events last 24 hours:   seen by urology for w/u scrotal necrosis/infection/pustules, recommendations for ct with iv contrast, will discuss with day team in setting recent renal failure requiring hd. +UA sensitivities pending, remains on ceftriaxone. with pleural effusion s/p lasix for diuresis.       PAST MEDICAL & SURGICAL HISTORY:  STEMI (ST elevation myocardial infarction)  Cardiogenic shock  Stroke  History of chronic respiratory failure      Allergies  No Known Allergies      FAMILY HISTORY:      Social History:   transferred from [facility]       Review of Systems:  unknown       Physical Examination:    General: No acute distress.      HEENT: Pupils equal, reactive to light.  Symmetric.    PULM: Clear to auscultation bilaterally, no significant sputum production    CVS: Regular rate and rhythm, no murmurs, rubs, or gallops    ABD: Soft, nondistended, nontender, normoactive bowel sounds, no masses    EXT: right toe wound    SKIN: Warm     NEURO: alert, interactive       Medications:  cefTRIAXone Injectable. 1000 milliGRAM(s) IV Push every 24 hours  aMIOdarone    Tablet 200 milliGRAM(s) Oral daily  midodrine 10 milliGRAM(s) Oral every 8 hours  melatonin 5 milliGRAM(s) Oral at bedtime  oxyCODONE    IR 2.5 milliGRAM(s) Oral every 6 hours PRN  QUEtiapine 50 milliGRAM(s) Oral at bedtime  clopidogrel Tablet 75 milliGRAM(s) Enteral Tube daily  heparin   Injectable 5000 Unit(s) SubCutaneous every 12 hours  pantoprazole  Injectable 40 milliGRAM(s) IV Push daily  albumin human 25% IVPB 50 milliLiter(s) IV Intermittent every 6 hours  ascorbic acid 500 milliGRAM(s) Oral daily  cholecalciferol 2000 Unit(s) Oral daily  thiamine 100 milliGRAM(s) Oral daily  cadexomer iodine 0.9% Gel 1 Application(s) Topical daily  chlorhexidine 0.12% Liquid 15 milliLiter(s) Oral Mucosa every 12 hours  chlorhexidine 4% Liquid 1 Application(s) Topical <User Schedule>  collagenase Ointment 1 Application(s) Topical daily  nystatin Powder 1 Application(s) Topical two times a day      Mode: AC/ CMV (Assist Control/ Continuous Mandatory Ventilation)  RR (machine): 20  TV (machine): 410  FiO2: 35  PEEP: 5  ITime: 0.8  PIP: 19      ICU Vital Signs Last 24 Hrs  T(C): 36.6 (17 May 2023 21:08), Max: 36.8 (17 May 2023 08:00)  T(F): 97.8 (17 May 2023 21:08), Max: 98.3 (17 May 2023 08:00)  HR: 108 (17 May 2023 18:00) (72 - 113)  BP: 116/53 (17 May 2023 18:00) (87/46 - 124/64)  BP(mean): 67 (17 May 2023 18:00) (56 - 81)  ABP: --  ABP(mean): --  RR: 28 (17 May 2023 18:00) (18 - 36)  SpO2: 99% (17 May 2023 18:00) (94% - 100%)    O2 Parameters below as of 17 May 2023 06:00  Patient On (Oxygen Delivery Method): ventilator      Vital Signs Last 24 Hrs  T(C): 36.6 (17 May 2023 21:08), Max: 36.8 (17 May 2023 08:00)  T(F): 97.8 (17 May 2023 21:08), Max: 98.3 (17 May 2023 08:00)  HR: 108 (17 May 2023 18:00) (72 - 113)  BP: 116/53 (17 May 2023 18:00) (87/46 - 124/64)  BP(mean): 67 (17 May 2023 18:00) (56 - 81)  RR: 28 (17 May 2023 18:00) (18 - 36)  SpO2: 99% (17 May 2023 18:00) (94% - 100%)    Parameters below as of 17 May 2023 06:00  Patient On (Oxygen Delivery Method): ventilator      I&O's Detail    16 May 2023 07:01  -  17 May 2023 07:00  --------------------------------------------------------  IN:    Free Water: 100 mL    Vital1.5: 310 mL  Total IN: 410 mL    OUT:    Rectal Tube (mL): 100 mL    Voided (mL): 0 mL  Total OUT: 100 mL  Total NET: 310 mL      17 May 2023 07:01  -  17 May 2023 22:29  --------------------------------------------------------  IN:    IV PiggyBack: 50 mL    Vital1.5: 600 mL  Total IN: 650 mL    OUT:    Incontinent per Condom Catheter (mL): 600 mL  Total OUT: 600 mL  Total NET: 50 mL      LABS:                        8.6    8.46  )-----------( 221      ( 17 May 2023 05:23 )             28.3     05-17    145  |  114<H>  |  43<H>  ----------------------------<  142<H>  4.5   |  25  |  1.36<H>    Ca    9.4      17 May 2023 05:23  Phos  2.8     05-  Mg     1.9         TPro  6.9  /  Alb  3.1<L>  /  TBili  1.1  /  DBili  x   /  AST  37  /  ALT  25  /  AlkPhos  105  05-16      PT/INR - ( 16 May 2023 16:53 )   PT: 11.3 sec;   INR: 0.97 ratio    PTT - ( 16 May 2023 16:53 )  PTT:55.4 sec      Urinalysis Basic - ( 16 May 2023 18:00 )  Color: Yellow / Appearance: Clear / S.015 / pH: x  Gluc: x / Ketone: Negative  / Bili: Negative / Urobili: Negative   Blood: x / Protein: Negative / Nitrite: Negative   Leuk Esterase: Moderate / RBC: 0-2 /HPF / WBC 6-10 /HPF   Sq Epi: x / Non Sq Epi: x / Bacteria: TNTC      CULTURES:      RADIOLOGY:         SUPPLEMENTAL O2:   LINES:  IVF:  KILO:   PPx:   CONTACT: Patient is a 77y old  Male who presents with a chief complaint of Transfer from [facility] (17 May 2023 16:12)      BRIEF HOSPITAL COURSE:   76 yo m pmhx Dm2, HTN, HLD transferred from [facility] after protracted hospital course for STEMI s/p MEDINA to mLCX and dLAD requiring impella complicated by cardiogenic shock requiring VA ECMO, respiratory failure requiring trach, BARBRA requiring HD, UGIB, massive hemoptysis originating from lingula s/p bronchial blocker and bronchial artery emobolization, pseudomonas bacteremia and septic shock.  Ultimately improved, minimal vent settings, no longer requiring HD, HD stable and was air transported to  with tentative plan for facility transfer.      Events last 24 hours:   seen by urology for w/u scrotal necrosis/infection/pustules, recommendations for ct with iv contrast, will discuss with day team in setting recent renal failure requiring hd. +UA sensitivities pending, remains on ceftriaxone. with pleural effusion s/p lasix for diuresis.       PAST MEDICAL & SURGICAL HISTORY:  STEMI (ST elevation myocardial infarction)  Cardiogenic shock  Stroke  History of chronic respiratory failure      Allergies  No Known Allergies      FAMILY HISTORY:      Social History:   transferred from [facility]       Review of Systems:  unknown       Physical Examination:    General: thin elderly male, lying in bed, and    HEENT: trach to vent    PULM: grossly cta b/l    CVS: Sinus tach    ABD: Soft, nondistended, nontender, normoactive bowel sounds, +peg    EXT: right toe wound    SKIN: Warm     NEURO: alert, interactive       Medications:  cefTRIAXone Injectable. 1000 milliGRAM(s) IV Push every 24 hours  aMIOdarone    Tablet 200 milliGRAM(s) Oral daily  midodrine 10 milliGRAM(s) Oral every 8 hours  melatonin 5 milliGRAM(s) Oral at bedtime  oxyCODONE    IR 2.5 milliGRAM(s) Oral every 6 hours PRN  QUEtiapine 50 milliGRAM(s) Oral at bedtime  clopidogrel Tablet 75 milliGRAM(s) Enteral Tube daily  heparin   Injectable 5000 Unit(s) SubCutaneous every 12 hours  pantoprazole  Injectable 40 milliGRAM(s) IV Push daily  albumin human 25% IVPB 50 milliLiter(s) IV Intermittent every 6 hours  ascorbic acid 500 milliGRAM(s) Oral daily  cholecalciferol 2000 Unit(s) Oral daily  thiamine 100 milliGRAM(s) Oral daily  cadexomer iodine 0.9% Gel 1 Application(s) Topical daily  chlorhexidine 0.12% Liquid 15 milliLiter(s) Oral Mucosa every 12 hours  chlorhexidine 4% Liquid 1 Application(s) Topical <User Schedule>  collagenase Ointment 1 Application(s) Topical daily  nystatin Powder 1 Application(s) Topical two times a day      Mode: AC/ CMV (Assist Control/ Continuous Mandatory Ventilation)  RR (machine): 20  TV (machine): 410  FiO2: 35  PEEP: 5  ITime: 0.8  PIP: 19      ICU Vital Signs Last 24 Hrs  T(C): 36.6 (17 May 2023 21:08), Max: 36.8 (17 May 2023 08:00)  T(F): 97.8 (17 May 2023 21:08), Max: 98.3 (17 May 2023 08:00)  HR: 108 (17 May 2023 18:00) (72 - 113)  BP: 116/53 (17 May 2023 18:00) (87/46 - 124/64)  BP(mean): 67 (17 May 2023 18:00) (56 - 81)  ABP: --  ABP(mean): --  RR: 28 (17 May 2023 18:00) (18 - 36)  SpO2: 99% (17 May 2023 18:00) (94% - 100%)    O2 Parameters below as of 17 May 2023 06:00  Patient On (Oxygen Delivery Method): ventilator      Vital Signs Last 24 Hrs  T(C): 36.6 (17 May 2023 21:08), Max: 36.8 (17 May 2023 08:00)  T(F): 97.8 (17 May 2023 21:08), Max: 98.3 (17 May 2023 08:00)  HR: 108 (17 May 2023 18:00) (72 - 113)  BP: 116/53 (17 May 2023 18:00) (87/46 - 124/64)  BP(mean): 67 (17 May 2023 18:00) (56 - 81)  RR: 28 (17 May 2023 18:00) (18 - 36)  SpO2: 99% (17 May 2023 18:00) (94% - 100%)    Parameters below as of 17 May 2023 06:00  Patient On (Oxygen Delivery Method): ventilator      I&O's Detail    16 May 2023 07:01  -  17 May 2023 07:00  --------------------------------------------------------  IN:    Free Water: 100 mL    Vital1.5: 310 mL  Total IN: 410 mL    OUT:    Rectal Tube (mL): 100 mL    Voided (mL): 0 mL  Total OUT: 100 mL  Total NET: 310 mL      17 May 2023 07:01  -  17 May 2023 22:29  --------------------------------------------------------  IN:    IV PiggyBack: 50 mL    Vital1.5: 600 mL  Total IN: 650 mL    OUT:    Incontinent per Condom Catheter (mL): 600 mL  Total OUT: 600 mL  Total NET: 50 mL      LABS:                        8.6    8.46  )-----------( 221      ( 17 May 2023 05:23 )             28.3     05-17    145  |  114<H>  |  43<H>  ----------------------------<  142<H>  4.5   |  25  |  1.36<H>    Ca    9.4      17 May 2023 05:23  Phos  2.8     -  Mg     1.9     -    TPro  6.9  /  Alb  3.1<L>  /  TBili  1.1  /  DBili  x   /  AST  37  /  ALT  25  /  AlkPhos  105  05-16      PT/INR - ( 16 May 2023 16:53 )   PT: 11.3 sec;   INR: 0.97 ratio    PTT - ( 16 May 2023 16:53 )  PTT:55.4 sec      Urinalysis Basic - ( 16 May 2023 18:00 )  Color: Yellow / Appearance: Clear / S.015 / pH: x  Gluc: x / Ketone: Negative  / Bili: Negative / Urobili: Negative   Blood: x / Protein: Negative / Nitrite: Negative   Leuk Esterase: Moderate / RBC: 0-2 /HPF / WBC 6-10 /HPF   Sq Epi: x / Non Sq Epi: x / Bacteria: TNTC      CULTURES:      RADIOLOGY:         SUPPLEMENTAL O2:   LINES:  BERTHA:  KILO:   MICHAELx:   CONTACT:

## 2023-05-17 NOTE — PHYSICAL THERAPY INITIAL EVALUATION ADULT - ADDITIONAL COMMENTS
prior to initial hospitalization in [place] pt was indep with all funcitonal mobility. since prolong intensive hospitlaization, loretta morgan pt was able to sit at EOB, stand with 2 person assist.

## 2023-05-17 NOTE — PHYSICAL THERAPY INITIAL EVALUATION ADULT - ACTIVE RANGE OF MOTION EXAMINATION, REHAB EVAL
except b/l ankle DF, limited 2/2 weakness/bilateral upper extremity Active ROM was WFL (within functional limits)/bilateral  lower extremity Active ROM was WFL (within functional limits)

## 2023-05-17 NOTE — DIETITIAN INITIAL EVALUATION ADULT - LAB (SPECIFY)
consider checking B6, B12, thiamine, folate, carnitine, and copper levels as malnutrition in cause these to be deficient 
- - -

## 2023-05-17 NOTE — ADVANCED PRACTICE NURSE CONSULT - ASSESSMENT
This is a 77 year old male admitted on 5/16/2023 with phx: per MD H&P of DM, HTN, HLD, who presented initially to an outside hospital in [place], [place] on 3/1/23 with chest pain and lightheadedness after doing yard work, found to have an inferiolateral ST elevations.  He was taken to the cath lab and underwent PCI with MEDINA placement to mLCX (Resolute [place] 4 x 26mm), dLAD (Resolute [place] 2.5 x 15mm and 2.25 x 12mm), and Impella CP was placed.   Patient was cannulated for peripheral VA ECMO on 3/2 and decannulated on 3/9.  Impella was removed 3/10.  Hospital course was complicated by multiple watershed strokes, acute respiratory failure requiring tracheostomy, BARBRA requiring renal replacement therapy, upper GI bleed, massive hemoptysis originating from lingula requiring bronchial blocker and bronchial artery embolization, pseudomonas bacteremia and septic shock.    Patient was eventually transported from [facility] on 5/16 to [facility] CCU with a tracheostomy, PEG, tunneled right sided HD catheter, right sided PICC line, rectal tube, and ostomy bag over his penis for urine collection."    Assessed patient on CCU  Gordon 11  Patient laying on total Care sport mattress for pressure redistribution.   Patient with Tracheostomy with mechanical ventilation   PEG tube noted with tube feedings  Patients weight 55.1kg on 5/17/2023  Dignishield rectal tube in place   Noted urine incontinence and severe skin irritation with partial to full thickness skin loss to scrotum and applied Condom Catheter to divert urine  One absorbant pad in place under patient with no diapering and feet in total cair boots.   Skin and Wound assessment:  Bilateral feet with dry scaling and callused skin with areas of hyperpigmentation and hypopigmentation. Recommend applying Sween 24 dimethicone lotion to dry scaling skin two times per day.   Left Foot:   -Great toes with 3cm x 2.1cm with toe nail off. Wound bed with 50%yellow/brown slough, tip of great toe with eschar 30% and 20% of pink wound bed. Upon palpation noted patient grimace in pain and noted purulent drainage from cuticle region. Cleansed with saline and PAT dry. Recommend Podiatry for further evaluation of wound, wound depth. Recommend to paint with betadine until podiatry evaluation.   -Left Heel:   -  2cm x 1.8cm x 0.2cm wound with pink wound bed and noted bleeding easily. scant serosanguinous drainage on old dressing. Periwound with dry scaling callus. Recommend Sween 24 to periwound for dry skin. To wound bed recommend adaptic, then telfa wrap with brian daily change. this can be classified as a stage 2 pressure injury   Sacrum:  -3cm x 2.5cm x 0.2cm wound with 100% firmly attached yellow slough with scant yellow drainage noted on old dressing. Periwound with hypopigmentation and epithelization at wound edge. This can be classified as an unstageable pressure injury. Recommend Cleanse with saline and pat dry. No sting barrier to periwound,  Collagenase to wound bed (adelso thick amount 2mm thick) to whole wound and cover with adaptic then small sacral foam. This is for enzymatic debridement of slough and to promote wound edge epithelization contraction.   Left Trochanter:   -3.8cm x 2.8cm x 0.1cm wound with 100% firmly attached yellow slough with scant yellow drainage noted on old dressing. Periwound with hypopigmentation and epithelization at wound edge. This can be classified as an unstageable pressure injury. Recommend Cleanse with saline and pat dry. No sting barrier to periwound,  Collagenase to wound bed (adelso thick amount 2mm thick) to whole wound and cover with adaptic then small sacral foam. This is for enzymatic debridement of slough and to promote wound edge epithelization contraction.   Scrotum:  -noted scrotum denuded with multiple areas of partial and full thickness skin loss and yellow slough. When cleansing patient noted grimacing. When cleaning patients scrotum recommend utilizing peribottle with Luke warm water and ph perispray and PAT dry. Recommend applying Triad cream after drying scrotum and applying a light coat on scrotum and wounds. Place an adaptic over scortum and Aquacel to accept any drainage. the Adptic will assist in preventing scrotum from sticking to pad and thighs to decrease pateints pain. triad cream will be utilized to assist in preventing further skin denuded and breakdown while promoting autolytic debridement of slough and promote new tissue growth to heal wounds. When cleaning patient after Triad has been place, please don not try and remove the triad residue as this will not impede wound healing. Apply another light coat of Triad after cleansing.             This is a 77 year old male admitted on 5/16/2023 with phx: per MD H&P of DM, HTN, HLD, who presented initially to an outside hospital in [place], [place] on 3/1/23 with chest pain and lightheadedness after doing yard work, found to have an inferiolateral ST elevations.  He was taken to the cath lab and underwent PCI with MEDINA placement to mLCX (Resolute Ever [place] 4 x 26mm), dLAD (Resolute [place] 2.5 x 15mm and 2.25 x 12mm), and Impella CP was placed.   Patient was cannulated for peripheral VA ECMO on 3/2 and decannulated on 3/9.  Impella was removed 3/10.  Hospital course was complicated by multiple watershed strokes, acute respiratory failure requiring tracheostomy, BARBRA requiring renal replacement therapy, upper GI bleed, massive hemoptysis originating from lingula requiring bronchial blocker and bronchial artery embolization, pseudomonas bacteremia and septic shock.    Patient was eventually transported from [facility] on 5/16 to [facility] CCU with a tracheostomy, PEG, tunneled right sided HD catheter, right sided PICC line, rectal tube, and ostomy bag over his penis for urine collection."    Assessed patient on CCU  Gordon 11  Patient laying on total Care sport mattress for pressure redistribution.   Patient with Tracheostomy with mechanical ventilation   PEG tube noted with tube feedings  Patients weight 55.1kg on 5/17/2023  Dignishield rectal tube in place   Noted urine incontinence and severe skin irritation with partial to full thickness skin loss to scrotum and applied Condom Catheter to divert urine  One absorbant pad in place under patient with no diapering and feet in total cair boots.   Skin and Wound assessment:  Bilateral feet with dry scaling and callused skin with areas of hyperpigmentation and hypopigmentation. Recommend applying Sween 24 dimethicone lotion to dry scaling skin two times per day.   Right Foot:   -Great toes with 3cm x 2.1cm with toe nail off. Wound bed with 50%yellow/brown slough, tip of great toe with eschar 30% and 20% of pink wound bed. Upon palpation noted patient grimace in pain and noted purulent drainage from cuticle region. Cleansed with saline and PAT dry. Recommend Podiatry for further evaluation of wound, wound depth. Recommend to paint with betadine until podiatry evaluation.   -Right Heel:   -  2cm x 1.8cm x 0.2cm wound with pink wound bed and noted bleeding easily. scant serosanguinous drainage on old dressing. Periwound with dry scaling callus. Recommend Sween 24 to periwound for dry skin. To wound bed recommend adaptic, then telfa wrap with brian daily change. this can be classified as a stage 2 pressure injury   Sacrum:  -3cm x 2.5cm x 0.2cm wound with 100% firmly attached yellow slough with scant yellow drainage noted on old dressing. Periwound with hypopigmentation and epithelization at wound edge. This can be classified as an unstageable pressure injury. Recommend Cleanse with saline and pat dry. No sting barrier to periwound,  Collagenase to wound bed (adelso thick amount 2mm thick) to whole wound and cover with adaptic then small sacral foam. This is for enzymatic debridement of slough and to promote wound edge epithelization contraction.   Left Trochanter:   -3.8cm x 2.8cm x 0.1cm wound with 100% firmly attached yellow slough with scant yellow drainage noted on old dressing. Periwound with hypopigmentation and epithelization at wound edge. This can be classified as an unstageable pressure injury. Recommend Cleanse with saline and pat dry. No sting barrier to periwound,  Collagenase to wound bed (adelso thick amount 2mm thick) to whole wound and cover with adaptic then small sacral foam. This is for enzymatic debridement of slough and to promote wound edge epithelization contraction.   Scrotum:  -noted scrotum denuded with multiple areas of partial and full thickness skin loss and yellow slough. When cleansing patient noted grimacing. When cleaning patients scrotum recommend utilizing peribottle with Luke warm water and ph perispray and PAT dry. Recommend applying Triad cream after drying scrotum and applying a light coat on scrotum and wounds. Place an adaptic over scortum and Aquacel to accept any drainage. the Adptic will assist in preventing scrotum from sticking to pad and thighs to decrease pateints pain. triad cream will be utilized to assist in preventing further skin denuded and breakdown while promoting autolytic debridement of slough and promote new tissue growth to heal wounds. When cleaning patient after Triad has been place, please don not try and remove the triad residue as this will not impede wound healing. Apply another light coat of Triad after cleansing.

## 2023-05-17 NOTE — CONSULT NOTE ADULT - ASSESSMENT
A: 76yo Male seen for the following:  -Partial thickness wound to R hallux  -Partial thickness wound to R heel  -DM  -Difficulty with ambulation    P:   Chart reviewed and Patient evaluated;  Discussed diagnosis and treatment with patient.  Ordered R foot xrays. Will f/u on results  Wound flush with normal saline  Applied betadine to Right hallux and xeroform to R heel with DSD  Wound to Right hallux is superficial in nature with scant sanguinous discharge on evaluation and surrounding eschar tissue, no pus/purulence, no PTB, stable at this time.   Wound to Right heel is superficial in nature with pink wound bed, no fluctuance, no crepitus, no PTB, stable at this time.   Continue with IV antibiotics As Per Med  Offloading of bilateral heels with CAIR boots while bedbound to reduce further tissue damage   Patient tolerated interventions well without any complications.   Podiatry will follow while in house.   A: 76yo Male seen for the following:  -Partial thickness wound to R hallux  -Partial thickness wound to R heel  -DM  -Difficulty with ambulation    P:   Chart reviewed and Patient evaluated;  Discussed diagnosis and treatment with patient.  Ordered R foot xrays. Will f/u on results  Wound flush with normal saline  Applied betadine to Right hallux and xeroform to R heel with DSD  Wound to Right hallux is superficial in nature with scant sanguinous discharge on evaluation and surrounding eschar tissue, no pus/purulence, no PTB, stable at this time.   Wound to Right heel is superficial in nature with pink wound bed, no fluctuance, no crepitus, no PTB, stable at this time.   Offloading of bilateral heels with CAIR boots while bedbound to reduce further tissue damage   Continue with IV antibiotics As Per Med  All additional care per Med appreciated  Patient tolerated interventions well without any complications.   Podiatry will follow while in house.

## 2023-05-17 NOTE — DIETITIAN INITIAL EVALUATION ADULT - NSICDXPASTMEDICALHX_GEN_ALL_CORE_FT
PAST MEDICAL HISTORY:  Cardiogenic shock     History of chronic respiratory failure     STEMI (ST elevation myocardial infarction)     Stroke

## 2023-05-17 NOTE — ADVANCED PRACTICE NURSE CONSULT - RECOMMEDATIONS
-Cavilon Advanced (liquid skin barrier) to bilateral Gluteal, inner thigh and perineum Every three days     -Sween 24 Diemthicone lotion to dry scaling skin on feet    -Scrotal Care -   -Cleanse with Perispray and Luke warm water in Per[place]ottle and pat dry,   -Apply TRIAD (Zinc-oxide barrier paste) BID and PRN for soiling:   It is alright to leave a thin layer of cream on the skin after cleansing as this will not impede wound healing, apply TRIAD onto of basement layer after soiled layer is removed.  -Consult Podiatry for left great toe for further evaluation     Left Heel:   - Cleanse with Saline and PAT dry   -To wound bed recommend adaptic  -Telfa   -Wrap with brian daily change.      Sacrum:  -Cleanse with saline and pat dry  - Cavilon (No sting barrier) to periwound  -Collagenase to wound bed (adelso thick amount 2mm thick) to whole wound  -Cover with adaptic   -Small sacral foam.   -Daily Change      Left Trochanter:   -Cleanse with saline and pat dry  - Cavilon (No sting barrier) to periwound  -Collagenase to wound bed (adelso thick amount 2mm thick) to whole wound  -Cover with adaptic   -Small sacral foam.   -Daily Change      -Continue turning/positioning patient from side-to-side q2h while in bed, q1h when/if OOB chair, or in accordance w/ pt's plan of care. Utilize pillows and/or Spry positioner pillow to assist w/ turning/positioning. When/if OOB chair, utilize pillows or chair cushion to offload pressure.   -Continue to offload heels from bed surface with soft pillow under calfs or by applying offloading total CAIR boots to BLEs.   -Continue utilizing one underpad underneath patient to contain incontinence episodes; change pad when saturated/soiled.  No Diapering   -Continue utilizing condom catheter (if patient candidate) to contain any urinary incontinence.   -Consider utilizing external fecal  (if patient candidate) or fecal management system/rectal tube/DigniShield (if patient candidate; MD order required) to contain loose fecal incontinence.   -Continue nutrition consult for optimal wound healing & nutritional status.   -Assess skin/wound qshift, report changes to primary provider.   -Maintain Low Air loss mattress for pressure redistribution  -Utilize HoverMAT to reduce friction and shear with boosting and transferring        Plan of Care: Primary RN made aware of above recs. Spoke w/ provider in regards to above. No further needs/recs from [facility]ON service at this time. Staff RN to perform routine skin/wound assessment and manage wound care. Questions or concerns or if wound worsens and reconsult needed, please contact CWON     -Cavilon Advanced (liquid skin barrier) to bilateral Gluteal, inner thigh and perineum Every three days     -Sween 24 Diemthicone lotion to dry scaling skin on feet    -Scrotal Care -   -Cleanse with Perispray and Luke warm water in Per[place]le and pat dry,   -Apply TRIAD (Zinc-oxide barrier paste) BID and PRN for soiling:   It is alright to leave a thin layer of cream on the skin after cleansing as this will not impede wound healing, apply TRIAD onto of basement layer after soiled layer is removed.  -Consult Podiatry for left great toe for further evaluation     Right Heel:   - Cleanse with Saline and PAT dry   -To wound bed recommend adaptic  -Telfa   -Wrap with brian daily change.      Sacrum:  -Cleanse with saline and pat dry  - Cavilon (No sting barrier) to periwound  -Collagenase to wound bed (adelso thick amount 2mm thick) to whole wound  -Cover with adaptic   -Small sacral foam.   -Daily Change      Left Trochanter:   -Cleanse with saline and pat dry  - Cavilon (No sting barrier) to periwound  -Collagenase to wound bed (adelso thick amount 2mm thick) to whole wound  -Cover with adaptic   -Small sacral foam.   -Daily Change      -Continue turning/positioning patient from side-to-side q2h while in bed, q1h when/if OOB chair, or in accordance w/ pt's plan of care. Utilize pillows and/or Spry positioner pillow to assist w/ turning/positioning. When/if OOB chair, utilize pillows or chair cushion to offload pressure.   -Continue to offload heels from bed surface with soft pillow under calfs or by applying offloading total CAIR boots to BLEs.   -Continue utilizing one underpad underneath patient to contain incontinence episodes; change pad when saturated/soiled.  No Diapering   -Continue utilizing condom catheter (if patient candidate) to contain any urinary incontinence.   -Consider utilizing external fecal  (if patient candidate) or fecal management system/rectal tube/DigniShield (if patient candidate; MD order required) to contain loose fecal incontinence.   -Continue nutrition consult for optimal wound healing & nutritional status.   -Assess skin/wound qshift, report changes to primary provider.   -Maintain Low Air loss mattress for pressure redistribution  -Utilize HoverMAT to reduce friction and shear with boosting and transferring        Plan of Care: Primary RN made aware of above recs. Spoke w/ provider in regards to above. No further needs/recs from [facility]ON service at this time. Staff RN to perform routine skin/wound assessment and manage wound care. Questions or concerns or if wound worsens and reconsult needed, please contact CWON     -Cavilon Advanced (liquid skin barrier) to bilateral Gluteal, inner thigh and perineum Every three days     -Sween 24 Diemthicone lotion to dry scaling skin on feet    -Scrotal Care -   -Cleanse with Perispray and Luke warm water in Per[place]le and pat dry,   -Apply TRIAD (Zinc-oxide barrier paste) BID and PRN for soiling:   It is alright to leave a thin layer of cream on the skin after cleansing as this will not impede wound healing, apply TRIAD onto of basement layer after soiled layer is removed.  -Consult Podiatry for Right great toe for further evaluation     Right Heel:   - Cleanse with Saline and PAT dry   -To wound bed recommend adaptic  -Telfa   -Wrap with brian daily change.      Sacrum:  -Cleanse with saline and pat dry  - Cavilon (No sting barrier) to periwound  -Collagenase to wound bed (adelso thick amount 2mm thick) to whole wound  -Cover with adaptic   -Small sacral foam.   -Daily Change      Left Trochanter:   -Cleanse with saline and pat dry  - Cavilon (No sting barrier) to periwound  -Collagenase to wound bed (adelso thick amount 2mm thick) to whole wound  -Cover with adaptic   -Small sacral foam.   -Daily Change      -Continue turning/positioning patient from side-to-side q2h while in bed, q1h when/if OOB chair, or in accordance w/ pt's plan of care. Utilize pillows and/or Spry positioner pillow to assist w/ turning/positioning. When/if OOB chair, utilize pillows or chair cushion to offload pressure.   -Continue to offload heels from bed surface with soft pillow under calfs or by applying offloading total CAIR boots to BLEs.   -Continue utilizing one underpad underneath patient to contain incontinence episodes; change pad when saturated/soiled.  No Diapering   -Continue utilizing condom catheter (if patient candidate) to contain any urinary incontinence.   -Consider utilizing external fecal  (if patient candidate) or fecal management system/rectal tube/DigniShield (if patient candidate; MD order required) to contain loose fecal incontinence.   -Continue nutrition consult for optimal wound healing & nutritional status.   -Assess skin/wound qshift, report changes to primary provider.   -Maintain Low Air loss mattress for pressure redistribution  -Utilize HoverMAT to reduce friction and shear with boosting and transferring        Plan of Care: Primary RN made aware of above recs. Spoke w/ provider in regards to above. No further needs/recs from CWON service at this time. Staff RN to perform routine skin/wound assessment and manage wound care. Questions or concerns or if wound worsens and reconsult needed, please contact CWON

## 2023-05-17 NOTE — PHYSICAL THERAPY INITIAL EVALUATION ADULT - TRANSFER TRAINING, PT EVAL
GOAL: Pt will perform sit to/from stand transfers with mod Ax1 with/without AD as needed within 4weeks.

## 2023-05-17 NOTE — PATIENT PROFILE ADULT - NSPRONUTRITIONRISK_GEN_A_NUR
Detail Level: Detailed Cpt Desired:  Showing: no pathologic findings Koh Intro Text (From The.....): A KOH prep was ordered and evaluated from the Koh Procedure Text (Tissue Harvesting Technique): A 15-blade scalpel was used to scrape the skin. The skin scrapings were placed on a glass slide, covered with a coverslip and a KOH solution was applied. Pressure injury stage 2 or greater

## 2023-05-17 NOTE — DIETITIAN NUTRITION RISK NOTIFICATION - TREATMENT: THE FOLLOWING DIET HAS BEEN RECOMMENDED
Diet, NPO with Tube Feed:   Tube Feeding Modality: Gastrostomy  Vital 1.5 [place] (VITAL1.5)  Total Volume for 24 Hours (mL): 1320  Continuous  Until Goal Tube Feed Rate (mL per Hour): 55  Tube Feed Duration (in Hours): 24  Tube Feed Start Time: 12:00  Free Water Flush  Free Water Flush Instructions:  consider free water flushes to maintain hydration as per MD Isaacs Carb Prosource TF     Qty per Day:  2 (05-17-23 @ 11:59) [Active]

## 2023-05-17 NOTE — PHYSICAL THERAPY INITIAL EVALUATION ADULT - BALANCE DISTURBANCE, IDENTIFIED IMPAIRMENT CONTRIBUTE, REHAB EVAL
decreased endurance/impaired motor control/impaired postural control/decreased ROM/decreased strength

## 2023-05-17 NOTE — PROGRESS NOTE ADULT - ASSESSMENT
76 yo m pmhx Dm2, HTN, HLD transferred from [facility] after protracted hospital course for STEMI s/p MEDINA to mLCX and dLAD requiring impella complicated by cardiogenic shock requiring VA ECMO, respiratory failure requiring trach, BARBRA requiring HD, UGIB, massive hemoptysis originating from lingula s/p bronchial blocker and bronchial artery emobolization, pseudomonas bacteremia and septic shock.  Ultimately improved, minimal vent settings, no longer requiring HD, HD stable and was air transported to  with tentative plan for facility transfer. 78 yo m pmhx Dm2, HTN, HLD transferred from [facility] after protracted hospital course for STEMI s/p MEDINA to mLCX and dLAD requiring impella complicated by cardiogenic shock requiring VA ECMO, respiratory failure requiring trach, BARBRA requiring HD, UGIB, massive hemoptysis originating from lingula s/p bronchial blocker and bronchial artery emobolization, pseudomonas bacteremia and septic shock.  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     NEURO: continue current regimen  CV: midodrine for bp support, amio for rate control   RESP: Chronic resp fx, tolerated tc today, vent overnight.  chest pt/suctioning as tolerated. inh bronchodilators prn  RENAL: BARBRA, formerly on HD, making good urine.  Trend bun/cr, urine output, electrolytes, replace lytes as needed.    GI: npo with tf  ENDO: No active issues   ID: ceftriaxone for coverage. sensitivities pending  HEME: Heparin for vte ppx  DISPO: DNR

## 2023-05-17 NOTE — DIETITIAN INITIAL EVALUATION ADULT - PERTINENT MEDS FT
MEDICATIONS  (STANDING):  aMIOdarone    Tablet 200 milliGRAM(s) Oral daily  ascorbic acid 500 milliGRAM(s) Oral daily  cefTRIAXone Injectable. 1000 milliGRAM(s) IV Push every 24 hours  chlorhexidine 0.12% Liquid 15 milliLiter(s) Oral Mucosa every 12 hours  chlorhexidine 4% Liquid 1 Application(s) Topical <User Schedule>  cholecalciferol 2000 Unit(s) Oral daily  clopidogrel Tablet 75 milliGRAM(s) Enteral Tube daily  collagenase Ointment 1 Application(s) Topical daily  heparin   Injectable 5000 Unit(s) SubCutaneous every 12 hours  melatonin 5 milliGRAM(s) Oral at bedtime  midodrine 10 milliGRAM(s) Oral every 8 hours  nystatin Powder 1 Application(s) Topical two times a day  pantoprazole  Injectable 40 milliGRAM(s) IV Push daily  QUEtiapine 50 milliGRAM(s) Oral at bedtime    MEDICATIONS  (PRN):  oxyCODONE    IR 2.5 milliGRAM(s) Oral every 6 hours PRN moderate to severe pain

## 2023-05-17 NOTE — CONSULT NOTE ADULT - ASSESSMENT
A/P:  78 y/o male with scrotal pustules    Ck Urine and Blood Cultures  Cont Rocephin until sensitivities come back  Ck labs  CT abd/pelvis (including scrotal area) with and without contrast to see infected area  Continue rectal tube and condom catheter to keep scrotal area dry  WIll likely need scrotal debridement once CT scan completed  Above discussed with Dr. Gallego

## 2023-05-18 DIAGNOSIS — N17.9 ACUTE KIDNEY FAILURE, UNSPECIFIED: ICD-10-CM

## 2023-05-18 DIAGNOSIS — I25.10 ATHEROSCLEROTIC HEART DISEASE OF NATIVE CORONARY ARTERY WITHOUT ANGINA PECTORIS: ICD-10-CM

## 2023-05-18 DIAGNOSIS — N50.89 OTHER SPECIFIED DISORDERS OF THE MALE GENITAL ORGANS: ICD-10-CM

## 2023-05-18 DIAGNOSIS — N49.2 INFLAMMATORY DISORDERS OF SCROTUM: ICD-10-CM

## 2023-05-18 DIAGNOSIS — J96.01 ACUTE RESPIRATORY FAILURE WITH HYPOXIA: ICD-10-CM

## 2023-05-18 LAB
ANION GAP SERPL CALC-SCNC: 6 MMOL/L — SIGNIFICANT CHANGE UP (ref 5–17)
BUN SERPL-MCNC: 37 MG/DL — HIGH (ref 7–23)
CALCIUM SERPL-MCNC: 10.4 MG/DL — HIGH (ref 8.5–10.1)
CHLORIDE SERPL-SCNC: 114 MMOL/L — HIGH (ref 96–108)
CO2 SERPL-SCNC: 26 MMOL/L — SIGNIFICANT CHANGE UP (ref 22–31)
CREAT SERPL-MCNC: 1.32 MG/DL — HIGH (ref 0.5–1.3)
EGFR: 56 ML/MIN/1.73M2 — LOW
GLUCOSE SERPL-MCNC: 131 MG/DL — HIGH (ref 70–99)
HAV IGM SER-ACNC: SIGNIFICANT CHANGE UP
HBV CORE IGM SER-ACNC: SIGNIFICANT CHANGE UP
HBV SURFACE AG SER-ACNC: SIGNIFICANT CHANGE UP
HCT VFR BLD CALC: 35.5 % — LOW (ref 39–50)
HCV AB S/CO SERPL IA: 0.11 S/CO — SIGNIFICANT CHANGE UP (ref 0–0.99)
HCV AB SERPL-IMP: SIGNIFICANT CHANGE UP
HGB BLD-MCNC: 10.7 G/DL — LOW (ref 13–17)
IRON SATN MFR SERPL: 28 % — SIGNIFICANT CHANGE UP (ref 16–55)
IRON SATN MFR SERPL: 56 UG/DL — SIGNIFICANT CHANGE UP (ref 45–165)
MAGNESIUM SERPL-MCNC: 2 MG/DL — SIGNIFICANT CHANGE UP (ref 1.6–2.6)
MCHC RBC-ENTMCNC: 30.1 GM/DL — LOW (ref 32–36)
MCHC RBC-ENTMCNC: 30.8 PG — SIGNIFICANT CHANGE UP (ref 27–34)
MCV RBC AUTO: 102.3 FL — HIGH (ref 80–100)
PHOSPHATE SERPL-MCNC: 4.1 MG/DL — SIGNIFICANT CHANGE UP (ref 2.5–4.5)
PLATELET # BLD AUTO: 263 K/UL — SIGNIFICANT CHANGE UP (ref 150–400)
POTASSIUM SERPL-MCNC: 4 MMOL/L — SIGNIFICANT CHANGE UP (ref 3.5–5.3)
POTASSIUM SERPL-SCNC: 4 MMOL/L — SIGNIFICANT CHANGE UP (ref 3.5–5.3)
RBC # BLD: 3.47 M/UL — LOW (ref 4.2–5.8)
RBC # FLD: 16.5 % — HIGH (ref 10.3–14.5)
SODIUM SERPL-SCNC: 146 MMOL/L — HIGH (ref 135–145)
TIBC SERPL-MCNC: 198 UG/DL — LOW (ref 220–430)
UIBC SERPL-MCNC: 142 UG/DL — SIGNIFICANT CHANGE UP (ref 110–370)
WBC # BLD: 7.28 K/UL — SIGNIFICANT CHANGE UP (ref 3.8–10.5)
WBC # FLD AUTO: 7.28 K/UL — SIGNIFICANT CHANGE UP (ref 3.8–10.5)

## 2023-05-18 PROCEDURE — 99232 SBSQ HOSP IP/OBS MODERATE 35: CPT

## 2023-05-18 PROCEDURE — 99291 CRITICAL CARE FIRST HOUR: CPT

## 2023-05-18 RX ORDER — HYDROMORPHONE HYDROCHLORIDE 2 MG/ML
1 INJECTION INTRAMUSCULAR; INTRAVENOUS; SUBCUTANEOUS EVERY 4 HOURS
Refills: 0 | Status: DISCONTINUED | OUTPATIENT
Start: 2023-05-18 | End: 2023-05-25

## 2023-05-18 RX ORDER — LIDOCAINE HCL 20 MG/ML
5 VIAL (ML) INJECTION ONCE
Refills: 0 | Status: COMPLETED | OUTPATIENT
Start: 2023-05-18 | End: 2023-05-18

## 2023-05-18 RX ADMIN — Medication 2000 UNIT(S): at 10:22

## 2023-05-18 RX ADMIN — HYDROMORPHONE HYDROCHLORIDE 1 MILLIGRAM(S): 2 INJECTION INTRAMUSCULAR; INTRAVENOUS; SUBCUTANEOUS at 09:53

## 2023-05-18 RX ADMIN — Medication 1 APPLICATION(S): at 13:31

## 2023-05-18 RX ADMIN — NYSTATIN CREAM 1 APPLICATION(S): 100000 CREAM TOPICAL at 22:25

## 2023-05-18 RX ADMIN — HEPARIN SODIUM 5000 UNIT(S): 5000 INJECTION INTRAVENOUS; SUBCUTANEOUS at 10:23

## 2023-05-18 RX ADMIN — CEFTRIAXONE 1000 MILLIGRAM(S): 500 INJECTION, POWDER, FOR SOLUTION INTRAMUSCULAR; INTRAVENOUS at 22:25

## 2023-05-18 RX ADMIN — PANTOPRAZOLE SODIUM 40 MILLIGRAM(S): 20 TABLET, DELAYED RELEASE ORAL at 10:22

## 2023-05-18 RX ADMIN — Medication 50 MILLILITER(S): at 02:55

## 2023-05-18 RX ADMIN — Medication 5 MILLILITER(S): at 10:24

## 2023-05-18 RX ADMIN — Medication 1 MILLIGRAM(S): at 00:46

## 2023-05-18 RX ADMIN — CHLORHEXIDINE GLUCONATE 1 APPLICATION(S): 213 SOLUTION TOPICAL at 05:36

## 2023-05-18 RX ADMIN — Medication 50 MILLILITER(S): at 17:13

## 2023-05-18 RX ADMIN — OXYCODONE HYDROCHLORIDE 2.5 MILLIGRAM(S): 5 TABLET ORAL at 05:22

## 2023-05-18 RX ADMIN — CHLORHEXIDINE GLUCONATE 15 MILLILITER(S): 213 SOLUTION TOPICAL at 22:25

## 2023-05-18 RX ADMIN — Medication 50 MILLILITER(S): at 05:22

## 2023-05-18 RX ADMIN — CHLORHEXIDINE GLUCONATE 15 MILLILITER(S): 213 SOLUTION TOPICAL at 13:41

## 2023-05-18 RX ADMIN — QUETIAPINE FUMARATE 50 MILLIGRAM(S): 200 TABLET, FILM COATED ORAL at 22:26

## 2023-05-18 RX ADMIN — MIDODRINE HYDROCHLORIDE 10 MILLIGRAM(S): 2.5 TABLET ORAL at 05:22

## 2023-05-18 RX ADMIN — CLOPIDOGREL BISULFATE 75 MILLIGRAM(S): 75 TABLET, FILM COATED ORAL at 10:23

## 2023-05-18 RX ADMIN — Medication 100 MILLIGRAM(S): at 10:23

## 2023-05-18 RX ADMIN — HYDROMORPHONE HYDROCHLORIDE 1 MILLIGRAM(S): 2 INJECTION INTRAMUSCULAR; INTRAVENOUS; SUBCUTANEOUS at 09:18

## 2023-05-18 RX ADMIN — Medication 500 MILLIGRAM(S): at 10:23

## 2023-05-18 RX ADMIN — AMIODARONE HYDROCHLORIDE 200 MILLIGRAM(S): 400 TABLET ORAL at 10:23

## 2023-05-18 RX ADMIN — HEPARIN SODIUM 5000 UNIT(S): 5000 INJECTION INTRAVENOUS; SUBCUTANEOUS at 22:25

## 2023-05-18 RX ADMIN — NYSTATIN CREAM 1 APPLICATION(S): 100000 CREAM TOPICAL at 16:09

## 2023-05-18 RX ADMIN — MIDODRINE HYDROCHLORIDE 10 MILLIGRAM(S): 2.5 TABLET ORAL at 22:26

## 2023-05-18 RX ADMIN — OXYCODONE HYDROCHLORIDE 2.5 MILLIGRAM(S): 5 TABLET ORAL at 06:00

## 2023-05-18 RX ADMIN — Medication 5 MILLIGRAM(S): at 22:25

## 2023-05-18 NOTE — CHART NOTE - NSCHARTNOTEFT_GEN_A_CORE
Pt placed on 40% TC, tolerating well at this time.  Sx'd scant amount of white secretions, non productive cough.   SpO2 99%

## 2023-05-18 NOTE — PROGRESS NOTE ADULT - SUBJECTIVE AND OBJECTIVE BOX
76 y/o male with PMH HTN, HLD, DM2, recent prolonged hospital course in [place], [place] after a IWMI (underwent PCI with MEDINA to LCx) complicated by cardiogenic shock requiring ECMO, acute hypoxic resp failure requiring trach, dysphagia requiring PEG, BARBRA requiring dialysis, pA.fib, acute CVA, septic shock from Pseudomonas bacteremia, hemoptysis, GI bleed     Now transferred to  for further care.  Case discussed on CCU rounds, still with pressor requirements.  On trach collar.  Case discussed on CCU rounds, with Nephrology and the family    Dialysis catheter removed    ICU Vital Signs Last 24 Hrs  T(C): 36.6 (18 May 2023 15:33), Max: 36.7 (18 May 2023 06:04)  T(F): 97.8 (18 May 2023 15:33), Max: 98 (18 May 2023 06:04)  HR: 110 (18 May 2023 18:00) (102 - 122)  BP: 126/54 (18 May 2023 18:00) (100/55 - 126/54)  BP(mean): 68 (18 May 2023 18:00) (58 - 77)  RR: 18 (18 May 2023 18:00) (15 - 39)  SpO2: 98% (18 May 2023 16:00) (85% - 100%)    O2 Parameters below as of 18 May 2023 06:00  Patient On (Oxygen Delivery Method): tracheostomy collar    O2 Concentration (%): 40                                10.7   7.28  )-----------( 263      ( 18 May 2023 05:42 )             35.5         05-18    146<H>  |  114<H>  |  37<H>  ----------------------------<  131<H>  4.0   |  26  |  1.32<H>    Ca    10.4<H>      18 May 2023 05:42  Phos  4.1     05-18  Mg     2.0     05-18

## 2023-05-18 NOTE — PROGRESS NOTE ADULT - SUBJECTIVE AND OBJECTIVE BOX
Date of Service: 5/18/23  HPI: 78 yo male with hx of DM, HTN, HLD, who presented initially to an outside hospital in [place], [place] on 3/1/23 with chest pain and lightheadedness after doing yard work, found to have an inferiolateral ST elevations.  He was taken to the cath lab and underwent PCI with MEDINA placement to mLCX (Resolute [place] 4 x 26mm), dLAD (Resolute [place] 2.5 x 15mm and 2.25 x 12mm), and Impella CP was placed.   Patient was cannulated for peripheral VA ECMO on 3/2 and decannulated on 3/9.  Impella was removed 3/10.  Hospital course was complicated by multiple watershed strokes, acute respiratory failure requiring tracheostomy, BARBRA requiring renal replacement therapy, upper GI bleed, massive hemoptysis originating from lingula requiring bronchial blocker and bronchial artery embolization, pseudomonas bacteremia and septic shock.    Patient was eventually transported from [facility] on 5/16 to [facility] CCU with a tracheostomy, PEG, tunneled right sided HD catheter, right sided PICC line, rectal tube, and ostomy bag over his penis for urine collection.     (16 May 2023 16:49)    Podiatry consulted for R foot evaluation. Pt at bedside, non verbal, family present. Per family, they report wounds being present for approx 3-4wks. Family admits to noticing yellow drainage from R big toe. Pt no aware pt missing R big toe nail. No additional pedal complaints reported at this time.     5/18/23: Pt seen by Podiatry team with attending present for follow up of R foot wounds. Pt awake and alert, family at bedside.     PMH: STEMI (ST elevation myocardial infarction)    Cardiogenic shock    Stroke    History of chronic respiratory failure      PSH:    Allergies:No Known Allergies      Labs:                          10.7   7.28  )-----------( 263      ( 18 May 2023 05:42 )             35.5     05-18    146<H>  |  114<H>  |  37<H>  ----------------------------<  131<H>  4.0   |  26  |  1.32<H>    Ca    10.4<H>      18 May 2023 05:42  Phos  4.1     05-18  Mg     2.0     05-18    TPro  6.9  /  Alb  3.1<L>  /  TBili  1.1  /  DBili  x   /  AST  37  /  ALT  25  /  AlkPhos  105  05-16    Vital Signs Last 24 Hrs  T(C): 36.3 (18 May 2023 11:00), Max: 36.7 (18 May 2023 06:04)  T(F): 97.4 (18 May 2023 11:00), Max: 98 (18 May 2023 06:04)  HR: 113 (18 May 2023 13:00) (102 - 122)  BP: 116/59 (18 May 2023 13:00) (101/47 - 123/62)  BP(mean): 73 (18 May 2023 13:00) (60 - 78)  RR: 21 (18 May 2023 13:00) (15 - 39)  SpO2: 100% (18 May 2023 13:00) (85% - 100%)    Parameters below as of 18 May 2023 06:00  Patient On (Oxygen Delivery Method): tracheostomy collar    O2 Concentration (%): 40    REVIEW OF SYSTEMS:    CONSTITUTIONAL: No weakness, fevers or chills  EYES/ENT: No visual changes;  No vertigo or throat pain   NECK: No pain or stiffness  RESPIRATORY: No cough, wheezing, hemoptysis; No shortness of breath  CARDIOVASCULAR: No chest pain or palpitations  GASTROINTESTINAL: No abdominal or epigastric pain. No nausea, vomiting, or hematemesis; No diarrhea or constipation. No melena or hematochezia.  GENITOURINARY: No dysuria, frequency or hematuria  NEUROLOGICAL: No numbness or weakness  SKIN: See physical examination.  All other review of systems is negative unless indicated above    Physical Exam:   Constitutional: NAD, alert;  Derm:  Skin warm, dry and supple bilateral.    Wound to nail bed of dorsal Left hallux with absent nail, superficial in nature, no hyperkeratotic border, wound base fibrogranular, wound size (approx 2.8 cm X 2cm), no edema, no purulence, no fluctuance, no tracking/tunneling, no probe to bone, mild sanguinous discharge distal to eponychium, eschar noted to medial distal hallux and at eponychium.   Pressure stage 2 wound to Right heel, no PTB, no crepitus, no fluctuance, superficial in nature with pink wound bed.   Vascular: Dorsalis Pedis and Posterior Tibial pulses non palpable.  Capillary refill delayed bilateral.    Neuro: Unable to access due to pt condition  MSK: Unable to access due to pt condition   Date of Service: 5/18/23  HPI: 78 yo male with hx of DM, HTN, HLD, who presented initially to an outside hospital in [place], [place] on 3/1/23 with chest pain and lightheadedness after doing yard work, found to have an inferiolateral ST elevations.  He was taken to the cath lab and underwent PCI with MEDINA placement to mLCX (Resolute [place] 4 x 26mm), dLAD (Resolute [place] 2.5 x 15mm and 2.25 x 12mm), and Impella CP was placed.   Patient was cannulated for peripheral VA ECMO on 3/2 and decannulated on 3/9.  Impella was removed 3/10.  Hospital course was complicated by multiple watershed strokes, acute respiratory failure requiring tracheostomy, BARBRA requiring renal replacement therapy, upper GI bleed, massive hemoptysis originating from lingula requiring bronchial blocker and bronchial artery embolization, pseudomonas bacteremia and septic shock.    Patient was eventually transported from [facility] on 5/16 to [facility] CCU with a tracheostomy, PEG, tunneled right sided HD catheter, right sided PICC line, rectal tube, and ostomy bag over his penis for urine collection.     (16 May 2023 16:49)    Podiatry consulted for R foot evaluation. Pt at bedside, non verbal, family present. Per family, they report wounds being present for approx 3-4wks. Family admits to noticing yellow drainage from R big toe. Pt no aware pt missing R big toe nail. No additional pedal complaints reported at this time.     5/18/23: Pt seen by Podiatry team with attending present for follow up of R foot wounds. Pt awake and alert, family at bedside.     PMH: STEMI (ST elevation myocardial infarction)    Cardiogenic shock    Stroke    History of chronic respiratory failure      PSH:    Allergies:No Known Allergies      Labs:                          10.7   7.28  )-----------( 263      ( 18 May 2023 05:42 )             35.5     05-18    146<H>  |  114<H>  |  37<H>  ----------------------------<  131<H>  4.0   |  26  |  1.32<H>    Ca    10.4<H>      18 May 2023 05:42  Phos  4.1     05-18  Mg     2.0     05-18    TPro  6.9  /  Alb  3.1<L>  /  TBili  1.1  /  DBili  x   /  AST  37  /  ALT  25  /  AlkPhos  105  05-16    Vital Signs Last 24 Hrs  T(C): 36.3 (18 May 2023 11:00), Max: 36.7 (18 May 2023 06:04)  T(F): 97.4 (18 May 2023 11:00), Max: 98 (18 May 2023 06:04)  HR: 113 (18 May 2023 13:00) (102 - 122)  BP: 116/59 (18 May 2023 13:00) (101/47 - 123/62)  BP(mean): 73 (18 May 2023 13:00) (60 - 78)  RR: 21 (18 May 2023 13:00) (15 - 39)  SpO2: 100% (18 May 2023 13:00) (85% - 100%)    Parameters below as of 18 May 2023 06:00  Patient On (Oxygen Delivery Method): tracheostomy collar    O2 Concentration (%): 40    REVIEW OF SYSTEMS:    CONSTITUTIONAL: No weakness, fevers or chills  EYES/ENT: No visual changes;  No vertigo or throat pain   NECK: No pain or stiffness  RESPIRATORY: No cough, wheezing, hemoptysis; No shortness of breath  CARDIOVASCULAR: No chest pain or palpitations  GASTROINTESTINAL: No abdominal or epigastric pain. No nausea, vomiting, or hematemesis; No diarrhea or constipation. No melena or hematochezia.  GENITOURINARY: No dysuria, frequency or hematuria  NEUROLOGICAL: No numbness or weakness  SKIN: See physical examination.  All other review of systems is negative unless indicated above    Physical Exam:   Constitutional: NAD, alert;  Derm:  Skin warm, dry and supple bilateral.    Wound to nail bed of dorsal Left hallux with absent nail, superficial in nature, no hyperkeratotic border, wound base fibrogranular, wound size (approx 2.8 cm X 2cm), no edema, no purulence, no fluctuance, no tracking/tunneling, no probe to bone, mild sanguinous discharge distal to eponychium, eschar noted to medial distal hallux and at eponychium.   Pressure stage 2 wound to Right heel, no PTB, no crepitus, no fluctuance, superficial in nature with pink wound bed.   Vascular: Dorsalis Pedis and Posterior Tibial pulses non palpable.  Capillary refill delayed bilateral.    Neuro: Unable to access due to pt condition  MSK: Unable to access due to pt condition        < from: Xray Foot AP + Lateral + Oblique, Right (05.17.23 @ 15:36) >  INTERPRETATION:  RIGHT foot    CLINICAL INFORMATION: Hallux and heel wound TECHNIQUE: AP,lateral and   oblique views.    FINDINGS: Heel soft tissue subcutaneous air adjacent to intact calcaneal   tuberosity.  RIGHT hallux are radiographically intact.  Second and third hammertoe deformities.  Remaining osseous and joint structures of the RIGHT foot are   radiographicallyintact.  The bones and joint spaces are radiographically intact.  No fracture or dislocation.  Soft tissues unremarkable.    IMPRESSION:    Heel subcutaneous air without radiographic evidence of calcaneal   tuberosity osteolysis/osteomyelitis.  Firsthallux radiographically intact..  If osteomyelitis is clinically considered  despite conservative therapy,   and soft tissue / bone infection requires further assessment, follow-up   MRI recommended.    --- End of Report ---    < end of copied text >

## 2023-05-18 NOTE — PROGRESS NOTE ADULT - ASSESSMENT
A: 76yo Male seen for the following:  -Partial thickness wound to R hallux  -Partial thickness wound to R heel  -DM  -Difficulty with ambulation    P:   Chart reviewed and Patient evaluated;  Discussed diagnosis and treatment with patient.  Ordered R foot xrays. Will f/u on results  Wound flush with normal saline  Applied betadine to Right hallux and xeroform to R heel with DSD  Wound to Right hallux is superficial in nature with scant sanguinous discharge on evaluation and surrounding eschar tissue, no pus/purulence, no PTB, stable at this time.   Wound to Right heel is superficial in nature with pink wound bed, no fluctuance, no crepitus, no PTB, stable at this time.   Offloading of bilateral heels with CAIR boots while bedbound to reduce further tissue damage   Continue with IV antibiotics As Per Med  All additional care per Med appreciated  Patient tolerated interventions well without any complications.   Podiatry will follow while in house.   A: 76yo Male seen for the following:  -Partial thickness wound to R hallux  -Partial thickness wound to R heel  -DM  -Difficulty with ambulation    P:   Chart reviewed and Patient evaluated;  Discussed diagnosis and treatment with patient.  Ordered R foot xrays. Will f/u on results  Wound flush with normal saline  Applied betadine to Right hallux and xeroform to R heel with DSD  Wound to Right hallux is superficial in nature with scant sanguinous discharge on evaluation and surrounding eschar tissue, no pus/purulence, no PTB, stable at this time.   Wound to Right heel is superficial in nature with pink wound bed, no fluctuance, no crepitus, no PTB, stable at this time.   WC: idosorb and DSD  Offloading of bilateral heels with CAIR boots while bedbound to reduce further tissue damage   Continue with IV antibiotics As Per Med  All additional care per Med appreciated  Patient tolerated interventions well without any complications.   Podiatry will follow while in house.   A: 76yo Male seen for the following:  -Partial thickness wound to R hallux  -Partial thickness wound to R heel  -DM  -Difficulty with ambulation    P:   -Chart reviewed and Patient evaluated;  -Discussed diagnosis and treatment with patient.  -Xrays of R foot reviewed: on wet read showing no acute cortical demineralization, noted subtle radiolucency by subcutaneous tissue of posterior heel.   -Wound flush with normal saline. Official impression noted above.   -Applied betadine to Right hallux and xeroform to R heel with DSD  -Wound to Right hallux is superficial in nature with scant sanguinous discharge on evaluation and surrounding eschar tissue, no pus/purulence, no PTB, stable at this time.   Wound to Right heel is superficial in nature with pink wound bed, no fluctuance, no crepitus, no PTB, stable at this time.   -WC: idosorb and DSD  -Offloading of bilateral heels with CAIR boots while bedbound to reduce further tissue damage   -5/18- on xray noted subtle radiolucency by subcutaneous tissue of posterior heel. Re-assessed heel wound at bedside today. No fluctuance, no crepitus, no PTB. The subcutaneous radiolucency/air noted on xray is air from wound.   -Will continue to monitor and continue local wound care. Will repeat R foot xrays over the weekend.   -Continue with IV antibiotics As Per Med  -All additional care per Med appreciated  -Patient tolerated interventions well without any complications.   -Podiatry will follow while in house.   A: 78yo Male seen for the following:  -Partial thickness wound to R hallux  -Partial thickness wound to R heel  -DM  -Difficulty with ambulation    P:   -Chart reviewed and Patient evaluated;  -Discussed diagnosis and treatment with patient.  -Xrays of R foot reviewed: on wet read showing no acute cortical demineralization, noted subtle radiolucency by subcutaneous tissue of posterior heel.   -Wound flush with normal saline. Official impression noted above.   -Applied betadine to Right hallux and xeroform to R heel with DSD  -Wound to Right hallux is superficial in nature with scant sanguinous discharge on evaluation and surrounding eschar tissue, no pus/purulence, no PTB, stable at this time.   Wound to Right heel is superficial in nature with pink wound bed, no fluctuance, no crepitus, no PTB, stable at this time.   -WC: idosorb and DSD  -Offloading of bilateral heels with CAIR boots while bedbound to reduce further tissue damage   -5/18- on xray noted subtle radiolucency by posterior heel. Re-assessed heel wound at bedside today evening. No fluctuance, no crepitus, no PTB. The subcutaneous radiolucency/air noted on xray is air from wound.   -Will continue to monitor and continue local wound care. Will repeat R foot xrays over the weekend.   -Continue with IV antibiotics As Per Med  -All additional care per Med appreciated  -Patient tolerated interventions well without any complications.   -Podiatry will follow while in house.   A: 78yo Male seen for the following:  -Partial thickness wound to R hallux  -Partial thickness wound to R heel  -DM  -Difficulty with ambulation    P:   -Chart reviewed and Patient evaluated;  -Discussed diagnosis and treatment with patient.  -Xrays of R foot reviewed: on wet read showing no acute cortical demineralization, noted subtle radiolucency by subcutaneous tissue of posterior heel.   -Wound flush with normal saline. Official impression noted above.   -Applied betadine to Right hallux and xeroform to R heel with DSD  -Wound to Right hallux is superficial in nature with scant sanguinous discharge on evaluation and surrounding eschar tissue, no pus/purulence, no PTB, stable at this time.   Wound to Right heel is superficial in nature with pink wound bed, no fluctuance, no crepitus, no PTB, stable at this time.   -WC: idosorb and DSD  -Offloading of bilateral heels with CAIR boots while bedbound to reduce further tissue damage   -5/18- on xray noted subtle air by subcutaneous tissue at posterior heel. Re-assessed heel wound at bedside today evening. No fluctuance, no crepitus, no PTB. The subcutaneous air noted on xray is air from wound.   -Will continue to monitor and continue local wound care. Will repeat R foot xrays over the weekend.   -Continue with IV antibiotics As Per Med  -All additional care per Med appreciated  -Patient tolerated interventions well without any complications.   -Podiatry will follow while in house.

## 2023-05-18 NOTE — PROGRESS NOTE ADULT - ASSESSMENT
82 yo male with extended complicated stay at [facility] , resides in [place], [place] .   pt w hx of HTN, DM, w 3/1/23 - STEMI required cath PCI to mLCCx and and dLAD required Impellar and ECMO.  pt was in BARBRA required CRRT and then transitioned to iHD since then TTS schedule but Cr been stable here .  per family last HD 5/10 , pt has TDC in place    BARBRA likey ATN related on HD - now possible recovery -    last HD 5/10 as per son    Cr continues to be stable and making good UOP would DC TDC soon    monitor UOP    bladder scan if UOP drops or Cr rises    daily labs   no IV contrast or NSAID    Cr stable, acid base stable - no acute indication for HD at this time       Hypernatremia   free water flushes via PEG     ml q6h started today       d/w CCU RN + Dr henriquez

## 2023-05-18 NOTE — PROGRESS NOTE ADULT - SUBJECTIVE AND OBJECTIVE BOX
Patient is a 77y old  Male who presents with a chief complaint of Transfer from [facility] (18 May 2023 18:41)      Vital Signs Last 24 Hrs  T(C): 37.3 (18 May 2023 21:40), Max: 37.3 (18 May 2023 21:40)  T(F): 99.2 (18 May 2023 21:40), Max: 99.2 (18 May 2023 21:40)  HR: 100 (18 May 2023 21:05) (100 - 122)  BP: 126/54 (18 May 2023 18:00) (100/55 - 126/54)  BP(mean): 68 (18 May 2023 18:00) (58 - 77)  RR: 18 (18 May 2023 18:00) (16 - 39)  SpO2: 98% (18 May 2023 21:05) (85% - 100%)    Parameters below as of 18 May 2023 06:00  Patient On (Oxygen Delivery Method): tracheostomy collar    O2 Concentration (%): 40  AAO x 3   Normal respiratory effort  Normal peripheral circulation  Abdomen- Soft, ND, NT   Penis: condom catheter in place   Scrotum skin edema and erythema with superficial skin excoriation, tender to touch, no crepitus     LABS:                        10.7   7.28  )-----------( 263      ( 18 May 2023 05:42 )             35.5     05-18    146<H>  |  114<H>  |  37<H>  ----------------------------<  131<H>  4.0   |  26  |  1.32<H>    Ca    10.4<H>      18 May 2023 05:42  Phos  4.1     05-18  Mg     2.0     05-18                Culture Results:   Numerous Pseudomonas aeruginosa  Normal Respiratory Cristiane absent (05-16-23 @ 23:14)  Culture Results:   >100,000 CFU/ml Escherichia coli (05-16-23 @ 18:00)  Culture Results:   No growth to date. (05-16-23 @ 16:54)  Culture Results:   No growth to date. (05-16-23 @ 16:53)

## 2023-05-18 NOTE — PROGRESS NOTE ADULT - SUBJECTIVE AND OBJECTIVE BOX
Reason for Admission: Transfer from [facility]  78 yo male with hx of DM, HTN, HLD, who presented initially to an outside hospital in [place], [place] on 3/1/23 with chest pain and lightheadedness after doing yard work, found to have an inferiolateral ST elevations.  He was taken to the cath lab and underwent PCI with MEDINA placement to mLCX (Resolute [place] 4 x 26mm), dLAD (Resolute [place] 2.5 x 15mm and 2.25 x 12mm), and Impella CP was placed.   Patient was cannulated for peripheral VA ECMO on 3/2 and decannulated on 3/9.  Impella was removed 3/10.  Hospital course was complicated by multiple watershed strokes, acute respiratory failure requiring tracheostomy, BARBRA requiring renal replacement therapy, upper GI bleed, massive hemoptysis originating from lingula requiring bronchial blocker and bronchial artery embolization, pseudomonas bacteremia and septic shock.    Patient was eventually transported from [facility] on  to [facility] CCU with a tracheostomy, PEG, tunneled right sided HD catheter, right sided PICC line, rectal tube, and ostomy bag over his penis for urine collection.      renal eval called for hx of BARBRA on RRT     pt is alert and waving at me   no new events        PAST MEDICAL & SURGICAL HISTORY:  STEMI (ST elevation myocardial infarction)  Cardiogenic shock  Stroke  History of chronic respiratory failure    MEDICATIONS  (STANDING):  albumin human 25% IVPB 50 milliLiter(s) IV Intermittent every 6 hours  aMIOdarone    Tablet 200 milliGRAM(s) Oral daily  ascorbic acid 500 milliGRAM(s) Oral daily  cadexomer iodine 0.9% Gel 1 Application(s) Topical daily  cefTRIAXone Injectable. 1000 milliGRAM(s) IV Push every 24 hours  chlorhexidine 0.12% Liquid 15 milliLiter(s) Oral Mucosa every 12 hours  chlorhexidine 4% Liquid 1 Application(s) Topical <User Schedule>  cholecalciferol 2000 Unit(s) Oral daily  clopidogrel Tablet 75 milliGRAM(s) Enteral Tube daily  collagenase Ointment 1 Application(s) Topical daily  heparin   Injectable 5000 Unit(s) SubCutaneous every 12 hours  lidocaine 2% Jelly 5 milliLiter(s) IntraUrethral once  melatonin 5 milliGRAM(s) Oral at bedtime  midodrine 10 milliGRAM(s) Oral every 8 hours  nystatin Powder 1 Application(s) Topical two times a day  pantoprazole  Injectable 40 milliGRAM(s) IV Push daily  QUEtiapine 50 milliGRAM(s) Oral at bedtime  thiamine 100 milliGRAM(s) Oral daily      Vital Signs Last 24 Hrs  T(C): 36.7 (18 May 2023 06:04), Max: 36.7 (18 May 2023 06:04)  T(F): 98 (18 May 2023 06:04), Max: 98 (18 May 2023 06:04)  HR: 120 (18 May 2023 09:00) (100 - 122)  BP: 120/58 (18 May 2023 09:00) (101/47 - 124/64)  BP(mean): 73 (18 May 2023 09:00) (60 - 78)  RR: 24 (18 May 2023 09:00) (15 - 39)  SpO2: 97% (18 May 2023 09:00) (85% - 100%)    Parameters below as of 18 May 2023 06:00  Patient On (Oxygen Delivery Method): tracheostomy collar    O2 Concentration (%): 40    I&O's Detail    17 May 2023 07:01  -  18 May 2023 07:00  --------------------------------------------------------  IN:    Free Water: 400 mL    IV PiggyBack: 150 mL    Vital1.5: 1100 mL  Total IN: 1650 mL    OUT:    Incontinent per Condom Catheter (mL): 975 mL    Rectal Tube (mL): 100 mL  Total OUT: 1075 mL    Total NET: 575 mL      PHYSICAL EXAM:    Constitutional: alert, weak, frail   HEENT:  EOMI,  MM  Neck: No LAD, No JVD  Respiratory: poor AE  Cardiovascular: S1 and S2  Gastrointestinal: BS+, soft, NT/ND  Extremities: No peripheral edema  Neurological: A/O   : No Recio  Skin: No rashes  Access: Not applicable    LABS:                          146    |  114    |  37     ----------------------------<  131       18 May 2023 05:42  4.0     |  26     |  1.32     145    |  114    |  43     ----------------------------<  142       17 May 2023 05:23  4.5     |  25     |  1.36     143    |  113    |  43     ----------------------------<  118       16 May 2023 16:53  4.7     |  26     |  1.51     Ca    10.4       18 May 2023 05:42  Ca    9.4        17 May 2023 05:23    Phos  4.1       18 May 2023 05:42  Phos  2.8       17 May 2023 05:23    Mg     2.0       18 May 2023 05:42  Mg     1.9       17 May 2023 05:23    TPro  6.9    /  Alb  3.1    /  TBili  1.1    /        16 May 2023 16:53  DBili  x      /  AST  37     /  ALT  25     /  AlkPhos  105                                10.7   7.28  )-----------( 263      ( 18 May 2023 05:42 )             35.5                         8.6    8.46  )-----------( 221      ( 17 May 2023 05:23 )             28.3             Urine Studies:  Urinalysis Basic - ( 16 May 2023 18:00 )    Color: Yellow / Appearance: Clear / S.015 / pH: x  Gluc: x / Ketone: Negative  / Bili: Negative / Urobili: Negative   Blood: x / Protein: Negative / Nitrite: Negative   Leuk Esterase: Moderate / RBC: 0-2 /HPF / WBC 6-10 /HPF   Sq Epi: x / Non Sq Epi: x / Bacteria: TNTC            RADIOLOGY & ADDITIONAL STUDIES:

## 2023-05-18 NOTE — PROGRESS NOTE ADULT - ASSESSMENT
Son by the bedside and was translating.     Discussed scrotal skin changes probably secondary to longstanding scrotal edema leading to skin excoriation and secondary infection.     Scrotal elevation.  Maintain local hygiene.   Topical and dressing per wound care.   Pelvis CT scan pending.   Continue antibiotics.

## 2023-05-19 LAB
-  AMIKACIN: SIGNIFICANT CHANGE UP
-  AMOXICILLIN/CLAVULANIC ACID: SIGNIFICANT CHANGE UP
-  AMOXICILLIN/CLAVULANIC ACID: SIGNIFICANT CHANGE UP
-  AMPICILLIN/SULBACTAM: SIGNIFICANT CHANGE UP
-  AMPICILLIN/SULBACTAM: SIGNIFICANT CHANGE UP
-  AMPICILLIN: SIGNIFICANT CHANGE UP
-  AMPICILLIN: SIGNIFICANT CHANGE UP
-  AZTREONAM: SIGNIFICANT CHANGE UP
-  CEFAZOLIN: SIGNIFICANT CHANGE UP
-  CEFAZOLIN: SIGNIFICANT CHANGE UP
-  CEFEPIME: SIGNIFICANT CHANGE UP
-  CEFOXITIN: SIGNIFICANT CHANGE UP
-  CEFOXITIN: SIGNIFICANT CHANGE UP
-  CEFTAZIDIME: SIGNIFICANT CHANGE UP
-  CEFTRIAXONE: SIGNIFICANT CHANGE UP
-  CEFTRIAXONE: SIGNIFICANT CHANGE UP
-  CEFUROXIME: SIGNIFICANT CHANGE UP
-  CIPROFLOXACIN: SIGNIFICANT CHANGE UP
-  ERTAPENEM: SIGNIFICANT CHANGE UP
-  ERTAPENEM: SIGNIFICANT CHANGE UP
-  GENTAMICIN: SIGNIFICANT CHANGE UP
-  IMIPENEM: SIGNIFICANT CHANGE UP
-  LEVOFLOXACIN: SIGNIFICANT CHANGE UP
-  MEROPENEM: SIGNIFICANT CHANGE UP
-  NITROFURANTOIN: SIGNIFICANT CHANGE UP
-  NITROFURANTOIN: SIGNIFICANT CHANGE UP
-  PIPERACILLIN/TAZOBACTAM: SIGNIFICANT CHANGE UP
-  TOBRAMYCIN: SIGNIFICANT CHANGE UP
-  TRIMETHOPRIM/SULFAMETHOXAZOLE: SIGNIFICANT CHANGE UP
-  TRIMETHOPRIM/SULFAMETHOXAZOLE: SIGNIFICANT CHANGE UP
24R-OH-CALCIDIOL SERPL-MCNC: 48.5 NG/ML — SIGNIFICANT CHANGE UP (ref 30–80)
CULTURE RESULTS: SIGNIFICANT CHANGE UP
FERRITIN SERPL-MCNC: 2325 NG/ML — HIGH (ref 30–400)
FOLATE SERPL-MCNC: 14.2 NG/ML — SIGNIFICANT CHANGE UP
METHOD TYPE: SIGNIFICANT CHANGE UP
ORGANISM # SPEC MICROSCOPIC CNT: SIGNIFICANT CHANGE UP
SPECIMEN SOURCE: SIGNIFICANT CHANGE UP
TRANSFERRIN SERPL-MCNC: 151 MG/DL — LOW (ref 200–360)
VIT B12 SERPL-MCNC: 1166 PG/ML — SIGNIFICANT CHANGE UP (ref 232–1245)

## 2023-05-19 PROCEDURE — 99233 SBSQ HOSP IP/OBS HIGH 50: CPT

## 2023-05-19 PROCEDURE — 72193 CT PELVIS W/DYE: CPT | Mod: 26

## 2023-05-19 RX ORDER — CEFEPIME 1 G/1
1000 INJECTION, POWDER, FOR SOLUTION INTRAMUSCULAR; INTRAVENOUS EVERY 12 HOURS
Refills: 0 | Status: DISCONTINUED | OUTPATIENT
Start: 2023-05-20 | End: 2023-05-22

## 2023-05-19 RX ORDER — CEFEPIME 1 G/1
1000 INJECTION, POWDER, FOR SOLUTION INTRAMUSCULAR; INTRAVENOUS ONCE
Refills: 0 | Status: COMPLETED | OUTPATIENT
Start: 2023-05-19 | End: 2023-05-19

## 2023-05-19 RX ORDER — CEFEPIME 1 G/1
INJECTION, POWDER, FOR SOLUTION INTRAMUSCULAR; INTRAVENOUS
Refills: 0 | Status: DISCONTINUED | OUTPATIENT
Start: 2023-05-19 | End: 2023-05-22

## 2023-05-19 RX ORDER — CEFEPIME 1 G/1
INJECTION, POWDER, FOR SOLUTION INTRAMUSCULAR; INTRAVENOUS
Refills: 0 | Status: DISCONTINUED | OUTPATIENT
Start: 2023-05-19 | End: 2023-05-19

## 2023-05-19 RX ADMIN — NYSTATIN CREAM 1 APPLICATION(S): 100000 CREAM TOPICAL at 10:30

## 2023-05-19 RX ADMIN — CEFEPIME 1000 MILLIGRAM(S): 1 INJECTION, POWDER, FOR SOLUTION INTRAMUSCULAR; INTRAVENOUS at 14:35

## 2023-05-19 RX ADMIN — Medication 2000 UNIT(S): at 10:29

## 2023-05-19 RX ADMIN — HEPARIN SODIUM 5000 UNIT(S): 5000 INJECTION INTRAVENOUS; SUBCUTANEOUS at 10:34

## 2023-05-19 RX ADMIN — HYDROMORPHONE HYDROCHLORIDE 1 MILLIGRAM(S): 2 INJECTION INTRAMUSCULAR; INTRAVENOUS; SUBCUTANEOUS at 21:54

## 2023-05-19 RX ADMIN — AMIODARONE HYDROCHLORIDE 200 MILLIGRAM(S): 400 TABLET ORAL at 10:29

## 2023-05-19 RX ADMIN — MIDODRINE HYDROCHLORIDE 10 MILLIGRAM(S): 2.5 TABLET ORAL at 21:42

## 2023-05-19 RX ADMIN — MIDODRINE HYDROCHLORIDE 10 MILLIGRAM(S): 2.5 TABLET ORAL at 05:16

## 2023-05-19 RX ADMIN — Medication 5 MILLIGRAM(S): at 21:42

## 2023-05-19 RX ADMIN — CLOPIDOGREL BISULFATE 75 MILLIGRAM(S): 75 TABLET, FILM COATED ORAL at 10:28

## 2023-05-19 RX ADMIN — Medication 50 MILLILITER(S): at 00:00

## 2023-05-19 RX ADMIN — HEPARIN SODIUM 5000 UNIT(S): 5000 INJECTION INTRAVENOUS; SUBCUTANEOUS at 21:41

## 2023-05-19 RX ADMIN — Medication 50 MILLILITER(S): at 05:16

## 2023-05-19 RX ADMIN — QUETIAPINE FUMARATE 50 MILLIGRAM(S): 200 TABLET, FILM COATED ORAL at 21:42

## 2023-05-19 RX ADMIN — CHLORHEXIDINE GLUCONATE 15 MILLILITER(S): 213 SOLUTION TOPICAL at 10:28

## 2023-05-19 RX ADMIN — Medication 1 APPLICATION(S): at 10:31

## 2023-05-19 RX ADMIN — Medication 500 MILLIGRAM(S): at 10:29

## 2023-05-19 RX ADMIN — CHLORHEXIDINE GLUCONATE 1 APPLICATION(S): 213 SOLUTION TOPICAL at 05:16

## 2023-05-19 RX ADMIN — Medication 1 APPLICATION(S): at 13:21

## 2023-05-19 RX ADMIN — NYSTATIN CREAM 1 APPLICATION(S): 100000 CREAM TOPICAL at 21:42

## 2023-05-19 RX ADMIN — HYDROMORPHONE HYDROCHLORIDE 1 MILLIGRAM(S): 2 INJECTION INTRAMUSCULAR; INTRAVENOUS; SUBCUTANEOUS at 21:39

## 2023-05-19 RX ADMIN — Medication 100 MILLIGRAM(S): at 10:29

## 2023-05-19 RX ADMIN — PANTOPRAZOLE SODIUM 40 MILLIGRAM(S): 20 TABLET, DELAYED RELEASE ORAL at 10:29

## 2023-05-19 RX ADMIN — MIDODRINE HYDROCHLORIDE 10 MILLIGRAM(S): 2.5 TABLET ORAL at 13:21

## 2023-05-19 RX ADMIN — CHLORHEXIDINE GLUCONATE 15 MILLILITER(S): 213 SOLUTION TOPICAL at 21:41

## 2023-05-19 NOTE — PROGRESS NOTE ADULT - SUBJECTIVE AND OBJECTIVE BOX
Interval HPI:  awaiting CT pelvis  does not want anyone near his scrotum due to pain, refusing exam    Exam:  alert,  frail and cachectic  on trach collar   reg rhythm  bilat air entry  abd soft  trace edema    Radiology: cxr - bilat basilar airspace opacities    On Admission  05-16-23 (3d)  HPI:  76 yo male with hx of DM, HTN, HLD, who presented initially to an outside hospital in [place], [place] on 3/1/23 with chest pain and lightheadedness after doing yard work, found to have an inferiolateral ST elevations.  He was taken to the cath lab and underwent PCI with MEDINA placement to mLCX (Resolute [place] 4 x 26mm), dLAD (Resolute Ever [place] 2.5 x 15mm and 2.25 x 12mm), and Impella CP was placed.   Patient was cannulated for peripheral VA ECMO on 3/2 and decannulated on 3/9.  Impella was removed 3/10.  Hospital course was complicated by multiple watershed strokes, acute respiratory failure requiring tracheostomy, BARBRA requiring renal replacement therapy, upper GI bleed, massive hemoptysis originating from lingula requiring bronchial blocker and bronchial artery embolization, pseudomonas bacteremia and septic shock.    Patient was eventually transported from [facility] on 5/16 to [facility] CCU with a tracheostomy, PEG, tunneled right sided HD catheter, right sided PICC line, rectal tube, and ostomy bag over his penis for urine collection.     (16 May 2023 16:49)    PAST MEDICAL & SURGICAL HISTORY:  STEMI (ST elevation myocardial infarction)      Cardiogenic shock      Stroke      History of chronic respiratory failure          Antimicrobial:  cefepime  Injectable.      cefepime  Injectable. 1000 milliGRAM(s) IV Push once    Cardiovascular:  aMIOdarone    Tablet 200 milliGRAM(s) Oral daily  midodrine 10 milliGRAM(s) Oral every 8 hours    Pulmonary:    Hematalogic:  clopidogrel Tablet 75 milliGRAM(s) Enteral Tube daily  heparin   Injectable 5000 Unit(s) SubCutaneous every 12 hours    Other:  ascorbic acid 500 milliGRAM(s) Oral daily  cadexomer iodine 0.9% Gel 1 Application(s) Topical daily  chlorhexidine 0.12% Liquid 15 milliLiter(s) Oral Mucosa every 12 hours  chlorhexidine 4% Liquid 1 Application(s) Topical <User Schedule>  cholecalciferol 2000 Unit(s) Oral daily  collagenase Ointment 1 Application(s) Topical daily  HYDROmorphone  Injectable 1 milliGRAM(s) IV Push every 4 hours PRN  melatonin 5 milliGRAM(s) Oral at bedtime  nystatin Powder 1 Application(s) Topical two times a day  oxyCODONE    IR 2.5 milliGRAM(s) Oral every 6 hours PRN  pantoprazole  Injectable 40 milliGRAM(s) IV Push daily  QUEtiapine 50 milliGRAM(s) Oral at bedtime  thiamine 100 milliGRAM(s) Oral daily      Drug Dosing Weight  Height (cm): 165.1 (17 May 2023 20:00)  Weight (kg): 53.2 (16 May 2023 16:35)  BMI (kg/m2): 19.5 (17 May 2023 20:00)  BSA (m2): 1.58 (17 May 2023 20:00)    T(C): 37.2 (05-19-23 @ 09:14), Max: 37.3 (05-18-23 @ 21:40)  HR: 121 (05-19-23 @ 13:00)  BP: 117/58 (05-19-23 @ 13:00)  BP(mean): 73 (05-19-23 @ 13:00)  ABP: --  ABP(mean): --  RR: 26 (05-19-23 @ 13:00)  SpO2: 100% (05-19-23 @ 13:00)          05-18 @ 07:01  -  05-19 @ 07:00  --------------------------------------------------------  IN: 1800 mL / OUT: 550 mL / NET: 1250 mL        Mode: standby        LABS:  CBC Full  -  ( 18 May 2023 05:42 )  WBC Count : 7.28 K/uL  RBC Count : 3.47 M/uL  Hemoglobin : 10.7 g/dL  Hematocrit : 35.5 %  Platelet Count - Automated : 263 K/uL  Mean Cell Volume : 102.3 fl  Mean Cell Hemoglobin : 30.8 pg  Mean Cell Hemoglobin Concentration : 30.1 gm/dL  Auto Neutrophil # : x  Auto Lymphocyte # : x  Auto Monocyte # : x  Auto Eosinophil # : x  Auto Basophil # : x  Auto Neutrophil % : x  Auto Lymphocyte % : x  Auto Monocyte % : x  Auto Eosinophil % : x  Auto Basophil % : x    05-18    146<H>  |  114<H>  |  37<H>  ----------------------------<  131<H>  4.0   |  26  |  1.32<H>    Ca    10.4<H>      18 May 2023 05:42  Phos  4.1     05-18  Mg     2.0     05-18          Culture Results:   Numerous Pseudomonas aeruginosa  Moderate Klebsiella aerogenes (Previously Enterobacter) Susceptibility to  follow.  Normal Respiratory Cristiane absent (05-16 @ 23:14)  Culture Results:   >100,000 CFU/ml Escherichia coli  >100,000 CFU/ml Klebsiella aerogenes (Previously Enterobacter) (05-16 @ 18:00)  Culture Results:   No growth to date. (05-16 @ 16:54)  Culture Results:   No growth to date. (05-16 @ 16:53)    ____________________________________________________________________________________________________

## 2023-05-19 NOTE — PROGRESS NOTE ADULT - ASSESSMENT
A: 78yo Male seen for the following:  -Partial thickness wound to R hallux  -Partial thickness wound to R heel  -DM  -Difficulty with ambulation    P:   -Chart reviewed and Patient evaluated;  -Discussed diagnosis and treatment with patient.  -Xrays of R foot reviewed: on wet read showing no acute cortical demineralization, noted subtle radiolucency by subcutaneous tissue of posterior heel.   -Wound flush with normal saline. Official impression noted above.   -Applied betadine to Right hallux and xeroform to R heel with DSD  -Wound to Right hallux is superficial in nature with scant sanguinous discharge on evaluation and surrounding eschar tissue, no pus/purulence, no PTB, stable at this time.   Wound to Right heel is superficial in nature with pink wound bed, no fluctuance, no crepitus, no PTB, stable at this time.   -WC: idosorb and DSD  -Offloading of bilateral heels with CAIR boots or while bedbound to reduce further tissue damage   -5/18- on xray noted subtle air by subcutaneous tissue at posterior heel. Re-assessed heel wound at bedside today evening. No fluctuance, no crepitus, no PTB. The subcutaneous air noted on xray is air from wound.   -Will continue to monitor and continue local wound care. Will repeat R foot xrays over the weekend.   -Continue with IV antibiotics As Per Med  -All additional care per Med appreciated  -Patient tolerated interventions well without any complications.   -Podiatry will follow while in house.

## 2023-05-19 NOTE — PROGRESS NOTE ADULT - ASSESSMENT
78 yo male with hx of DM, HTN, HLD, s/p STEMI complicated by cardiogenic shock, BARBRA requiring dialysis, acute respiratory failure requiring trach/peg.  Transported from [place] to [place] at family request.  Found to have scrotal wounds/ pressure ulcers.  Growing pseudomonas and klebsiella in sputum, e coli and klebsiella aerogenes in urine, nothing in blood.     Plan  - keep on trach collar   - ID to decide on abx  - kidney function improving  - CT pelvis to asses need for scrotal debridement   - DAPT, statin, BB

## 2023-05-19 NOTE — CONSULT NOTE ADULT - ASSESSMENT
78 yo male with hx of DM, HTN, HLD, who presented initially to an outside hospital in [place], [place] on 3/1/23 with chest pain and lightheadedness after doing yard work, found to have an inferiolateral ST elevations.  He was taken to the cath lab and underwent PCI with MEDINA placement to mLCX (Resolute Ever [place] 4 x 26mm), dLAD (Resolute [place] 2.5 x 15mm and 2.25 x 12mm), and Impella CP was placed.  Patient was cannulated for peripheral VA ECMO on 3/2 and decannulated on 3/9.  Impella was removed 3/10.  Hospital course was complicated by multiple watershed strokes, acute respiratory failure requiring tracheostomy, BARBRA requiring renal replacement therapy, upper GI bleed, massive hemoptysis originating from lingula requiring bronchial blocker and bronchial artery embolization, pseudomonas bacteremia and septic shock.  Patient was eventually transported from [facility] on 5/16 to [facility] CCU with a tracheostomy, PEG, tunneled right sided HD catheter, right sided PICC line, rectal tube, and ostomy bag over his penis for urine collection.       1. Complicated UTI with Enterobacter/Ecoli. Scrotal infection/cellulitis. R heel/R hallux wounds. Acute on chronic resp failure. BARBRA  - imaging and chart reviewed  - urine cx growing Enterobacter/Ecoli f/u sensitivities  - s/p rocephin 5/16-5/18 - change to IV cefepime [id number]  - xray noted, prob congestion over pna, sputum cx growing psae could be colonization - cefepime will cover  - urology eval noted  - f/u CT abd/pelvis for further eval  - continue with antibiotic coverage  - podiatry eval noted  - blood cx no growth   - fu cbc  - tolerating abx well so far; no side effects noted  - reason for abx use and side effects reviewed with patient  - supportive care    2. other issues - care per medicine

## 2023-05-19 NOTE — PROGRESS NOTE ADULT - SUBJECTIVE AND OBJECTIVE BOX
Spoke to son, appears to be no change from yesterday.    Vital Signs Last 24 Hrs  T(C): 37.2 (19 May 2023 09:14), Max: 37.3 (18 May 2023 21:40)  T(F): 98.9 (19 May 2023 09:14), Max: 99.2 (18 May 2023 21:40)  HR: 106 (19 May 2023 14:00) (98 - 124)  BP: 119/68 (19 May 2023 14:00) (93/64 - 127/66)  BP(mean): 81 (19 May 2023 14:00) (59 - 82)  RR: 23 (19 May 2023 14:00) (18 - 38)  SpO2: 100% (19 May 2023 14:00) (94% - 100%)    Parameters below as of 19 May 2023 14:00  Patient On (Oxygen Delivery Method): tracheostomy collar        I&O's Summary    18 May 2023 07:01  -  19 May 2023 07:00  --------------------------------------------------------  IN: 1800 mL / OUT: 550 mL / NET: 1250 mL    19 May 2023 07:01  -  19 May 2023 16:13  --------------------------------------------------------  IN: 0 mL / OUT: 450 mL / NET: -450 mL        Physical Exam    Abd: Soft, NT, ND   : Scrotal dressing in place, scrotal excoriations still present with some infection             Condom catheter in place draining clear urine                          10.7   7.28  )-----------( 263      ( 18 May 2023 05:42 )             35.5       05-18    146<H>  |  114<H>  |  37<H>  ----------------------------<  131<H>  4.0   |  26  |  1.32<H>    Ca    10.4<H>      18 May 2023 05:42  Phos  4.1     05-18  Mg     2.0     05-18        ACC: [id number] EXAM:  CT PELVIS ONLY IC   ORDERED BY: JAVI BOOTH     PROCEDURE DATE:  05/19/2023          INTERPRETATION:  CLINICAL INFORMATION: Scrotal infection    COMPARISON: None.    CONTRAST/COMPLICATIONS:  IV Contrast: Omnipaque 350  90 cc administered   10 cc discarded  Oral Contrast: NONE  Complications: None reported at time of study completion    PROCEDURE:  CT of the Pelvis was performed.  Sagittal and coronal reformats were performed.    FINDINGS: The study is limited due to beam hardening artifact from the   patient's left hip arthroplasty.  BLADDER: Within normal limits.  REPRODUCTIVE ORGANS: No scrotal fluid collection is identified. The   testes are grossly unremarkable. No scrotal or perineal soft tissue gas.  LYMPH NODES: No pelvic lymphadenopathy.    VISUALIZED PORTIONS:  ABDOMINAL ORGANS: Within normal limits.  BOWEL: Within normal limits. Normal appendix. Balloon tipped rectal   catheter in place.  PERITONEUM: No ascites.  VESSELS: Within normal limits.  ABDOMINAL WALL: Within normal limits.  BONES: Left hip arthroplasty.    IMPRESSION:  No scrotal fluid collection or soft tissue gas.

## 2023-05-19 NOTE — CONSULT NOTE ADULT - CONSULT REASON
Right lower extremity evaluation
BARBRA on RRT   nazia from [place]
Scrotal necrosis
scrotal infection/edema  complicated uti  psae in sputum cx

## 2023-05-19 NOTE — CONSULT NOTE ADULT - REASON FOR ADMISSION
35yo  at 36w1d w/ GDMA1.  BS 84 this AM. Patient indicates good control but DE follow up has been inconsistent.  GBS done.  RTC 1week.  Discussed cloace or miralx for constipation  
Transfer from [facility]
Transfer from [facility]
Transfer from [facility]
Transfer from [facility]

## 2023-05-19 NOTE — CONSULT NOTE ADULT - SUBJECTIVE AND OBJECTIVE BOX
HPI: 78 yo male with hx of DM, HTN, HLD, who presented initially to an outside hospital in [place], [place] on 3/1/23 with chest pain and lightheadedness after doing yard work, found to have an inferiolateral ST elevations.  He was taken to the cath lab and underwent PCI with MEDINA placement to mLCX (Resolute [place] 4 x 26mm), dLAD (Resolute [place] 2.5 x 15mm and 2.25 x 12mm), and Impella CP was placed.   Patient was cannulated for peripheral VA ECMO on 3/2 and decannulated on 3/9.  Impella was removed 3/10.  Hospital course was complicated by multiple watershed strokes, acute respiratory failure requiring tracheostomy, BARBRA requiring renal replacement therapy, upper GI bleed, massive hemoptysis originating from lingula requiring bronchial blocker and bronchial artery embolization, pseudomonas bacteremia and septic shock.    Patient was eventually transported from [facility] on  to [facility] CCU with a tracheostomy, PEG, tunneled right sided HD catheter, right sided PICC line, rectal tube, and ostomy bag over his penis for urine collection.        Called for scrotal necrosis/infection.  Son not sure if this is because pt has had urine or feces on scrotum.  Pt now with rectal tube and condom catheter.          PAST MEDICAL HISTORY:  Cardiogenic shock     History of chronic respiratory failure     STEMI (ST elevation myocardial infarction)     Stroke.     Social History:  · Substance use	Unable to obtain      Allergies:No Known Allergies    MEDICATIONS  (STANDING):  aMIOdarone    Tablet 200 milliGRAM(s) Oral daily  ascorbic acid 500 milliGRAM(s) Oral daily  cefTRIAXone Injectable. 1000 milliGRAM(s) IV Push every 24 hours  chlorhexidine 0.12% Liquid 15 milliLiter(s) Oral Mucosa every 12 hours  chlorhexidine 4% Liquid 1 Application(s) Topical <User Schedule>  cholecalciferol 2000 Unit(s) Oral daily  clopidogrel Tablet 75 milliGRAM(s) Enteral Tube daily  heparin   Injectable 5000 Unit(s) SubCutaneous every 12 hours  melatonin 5 milliGRAM(s) Oral at bedtime  midodrine 10 milliGRAM(s) Oral every 8 hours  nystatin Powder 1 Application(s) Topical two times a day  pantoprazole  Injectable 40 milliGRAM(s) IV Push daily  QUEtiapine 50 milliGRAM(s) Oral at bedtime    Risk Assessment:    Present on Admission:  Deep Venous Thrombosis	no  Pulmonary Embolus	no  Urinary Catheter	no  Central Venous Catheter/PICC Line	yes  right arm PICC and tunneled HD cath  Pressure Ulcer(s)	yes     HIV Screening:  · In accordance with [place] State law, we offer every patient who comes to our ED an HIV test. Would you like to be tested today?	Yes, get tested    REVIEW OF SYSTEMS:    CONSTITUTIONAL: No weakness, fevers or chills  EYES/ENT: No visual changes;  No vertigo or throat pain   NECK: No pain or stiffness  RESPIRATORY: No cough, wheezing, hemoptysis; No shortness of breath  CARDIOVASCULAR: No chest pain or palpitations  GASTROINTESTINAL: No abdominal or epigastric pain. No nausea, vomiting, or hematemesis; No diarrhea or constipation. No melena or hematochezia.  GENITOURINARY: No dysuria, frequency or hematuria  NEUROLOGICAL: No numbness or weakness  SKIN: See physical examination.  All other review of systems is negative unless indicated above    PE:    Vital Signs Last 24 Hrs  T(C): 36.4 (17 May 2023 17:06), Max: 36.8 (17 May 2023 08:00)  T(F): 97.6 (17 May 2023 17:06), Max: 98.3 (17 May 2023 08:00)  HR: 100 (17 May 2023 13:00) (72 - 112)  BP: 111/59 (17 May 2023 13:00) (87/46 - 124/64)  BP(mean): 73 (17 May 2023 13:00) (56 - 81)  RR: 27 (17 May 2023 13:00) (18 - 27)  SpO2: 100% (17 May 2023 13:00) (96% - 100%)    Parameters below as of 17 May 2023 06:00  Patient On (Oxygen Delivery Method): ventilator    Constitutional: alert, weak, frail   Respiratory: poor AE  Cardiovascular: S1 and S2  Gastrointestinal: BS+, soft, NT/ND, No bladder palp  Lower Ext:  No calf tenderness  : Condom catheter in place        Scrotum:  many pustules noted on scrotum                         Sloughing of skin noted    LABS:                          8.6    8.46  )-----------( 221      ( 17 May 2023 05:23 )             28.3     05-    145  |  114<H>  |  43<H>  ----------------------------<  142<H>  4.5   |  25  |  1.36<H>    Ca    9.4      17 May 2023 05:23  Phos  2.8     05-  Mg     1.9     -    TPro  6.9  /  Alb  3.1<L>  /  TBili  1.1  /  DBili  x   /  AST  37  /  ALT  25  /  AlkPhos  105  05-16    LIVER FUNCTIONS - ( 16 May 2023 16:53 )  Alb: 3.1 g/dL / Pro: 6.9 gm/dL / ALK PHOS: 105 U/L / ALT: 25 U/L / AST: 37 U/L / GGT: x           PT/INR - ( 16 May 2023 16:53 )   PT: 11.3 sec;   INR: 0.97 ratio         PTT - ( 16 May 2023 16:53 )  PTT:55.4 sec  Urinalysis Basic - ( 16 May 2023 18:00 )    Color: Yellow / Appearance: Clear / S.015 / pH: x  Gluc: x / Ketone: Negative  / Bili: Negative / Urobili: Negative   Blood: x / Protein: Negative / Nitrite: Negative   Leuk Esterase: Moderate / RBC: 0-2 /HPF / WBC 6-10 /HPF   Sq Epi: x / Non Sq Epi: x / Bacteria: TNTC      
Patient is a 77y old  Male who presents with a chief complaint of Transfer from [facility] (18 May 2023 19:45)    HPI:  78 yo male with hx of DM, HTN, HLD, who presented initially to an outside hospital in [place], [place] on 3/1/23 with chest pain and lightheadedness after doing yard work, found to have an inferiolateral ST elevations.  He was taken to the cath lab and underwent PCI with MEDINA placement to mLCX (Resolute [place] 4 x 26mm), dLAD (Resolute [place] 2.5 x 15mm and 2.25 x 12mm), and Impella CP was placed.  Patient was cannulated for peripheral VA ECMO on 3/2 and decannulated on 3/9.  Impella was removed 3/10.  Hospital course was complicated by multiple watershed strokes, acute respiratory failure requiring tracheostomy, BARBRA requiring renal replacement therapy, upper GI bleed, massive hemoptysis originating from lingula requiring bronchial blocker and bronchial artery embolization, pseudomonas bacteremia and septic shock.  Patient was eventually transported from [facility] on  to [facility] CCU with a tracheostomy, PEG, tunneled right sided HD catheter, right sided PICC line, rectal tube, and ostomy bag over his penis for urine collection.       pt seen and examined  on trach collar   HD catheter removed       PMH: as above  PSH: as above  Meds: per reconciliation sheet, noted below  MEDICATIONS  (STANDING):  aMIOdarone    Tablet 200 milliGRAM(s) Oral daily  ascorbic acid 500 milliGRAM(s) Oral daily  cadexomer iodine 0.9% Gel 1 Application(s) Topical daily  chlorhexidine 0.12% Liquid 15 milliLiter(s) Oral Mucosa every 12 hours  chlorhexidine 4% Liquid 1 Application(s) Topical <User Schedule>  cholecalciferol 2000 Unit(s) Oral daily  clopidogrel Tablet 75 milliGRAM(s) Enteral Tube daily  collagenase Ointment 1 Application(s) Topical daily  heparin   Injectable 5000 Unit(s) SubCutaneous every 12 hours  melatonin 5 milliGRAM(s) Oral at bedtime  midodrine 10 milliGRAM(s) Oral every 8 hours  nystatin Powder 1 Application(s) Topical two times a day  pantoprazole  Injectable 40 milliGRAM(s) IV Push daily  QUEtiapine 50 milliGRAM(s) Oral at bedtime  thiamine 100 milliGRAM(s) Oral daily      Allergies    No Known Allergies    Intolerances      Social: no smoking, no alcohol, no illegal drugs; no recent travel, no exposure to TB  FAMILY HISTORY:     no history of premature cardiovascular disease in first degree relatives    ROS: unable to obtain d/t medical condition    All other systems reviewed and are negative    Vital Signs Last 24 Hrs  T(C): 37.2 (19 May 2023 09:14), Max: 37.3 (18 May 2023 21:40)  T(F): 98.9 (19 May 2023 09:14), Max: 99.2 (18 May 2023 21:40)  HR: 116 (19 May 2023 11:00) (98 - 124)  BP: 111/56 (19 May 2023 11:00) (93/64 - 127/66)  BP(mean): 70 (19 May 2023 11:00) (58 - 82)  RR: 20 (19 May 2023 11:00) (18 - 38)  SpO2: 100% (19 May 2023 11:00) (94% - 100%)    Parameters below as of 19 May 2023 11:00  Patient On (Oxygen Delivery Method): tracheostomy collar      Daily     Daily Weight in k (19 May 2023 06:00)    PE:  Constitutional: frail looking  HEENT: NC/AT, EOMI, PERRLA, conjunctivae clear; ears and nose atraumatic; pharynx benign + trach collar  Neck: supple; thyroid not palpable  Back: no tenderness  Respiratory: decreased breath sounds   Cardiovascular: S1S2 regular, no murmurs  Abdomen: soft, not tender, not distended, positive BS; liver and spleen WNL  Genitourinary: no suprapubic tenderness Scrotum skin edema and erythema with superficial skin excoriation, tender to touch, no crepitus condom catheter in place   Lymphatic: no LN palpable  Musculoskeletal: no muscle tenderness, no joint swelling or tenderness  Extremities: no pedal edema R heel/R hallux wound  Neurological/ Psychiatric: moving all extremities  Skin: no rashes; no palpable lesions    Labs: all available labs reviewed                        10.7   728  )-----------( 263      ( 18 May 2023 05:42 )             35.5     05-18    146<H>  |  114<H>  |  37<H>  ----------------------------<  131<H>  4.0   |  26  |  1.32<H>    Ca    10.4<H>      18 May 2023 05:42  Phos  4.1       Mg     2.0            Culture - Sputum (23 @ 23:14)   Gram Stain:   Rare polymorphonuclear leukocytes per low power field   Few Squamous epithelial cells per low power field   Numerous Gram Negative Rods per oil power field  - Amikacin: S <=16  - Aztreonam: I 16  - Cefepime: S 8  - Ceftazidime: I 16  - Ciprofloxacin: S <=0.25  - Gentamicin: S 4  - Imipenem: S <=1  - Levofloxacin: S 1  - Meropenem: S <=1  - Piperacillin/Tazobactam: I 64  - Tobramycin: S <=2  Specimen Source: .Sputum Sputum  Culture Results:   Numerous Pseudomonas aeruginosa   Normal Respiratory Cristiane absent  Organism Identification: Pseudomonas aeruginosa  Organism: Pseudomonas aeruginosa  Method Type: MICCulture - Urine (23 @ 18:00)   Specimen Source: Clean Catch Clean Catch (Midstream)  Culture Results:   >100,000 CFU/ml Escherichia coli   >100,000 CFU/ml Klebsiella aerogenes (Previously Enterobacter)  Culture - Blood (23 @ 16:54)   Specimen Source: .Blood None  Culture Results:   No growth to date.    Radiology: all available radiological tests reviewed  < from: Xray Chest 1 View- PORTABLE-Routine (23 @ 20:23) >    ACC: [id number] EXAM:  XR CHEST PORTABLE ROUTINE 1V   ORDERED BY: RADHA AVALOS     PROCEDURE DATE:  2023          INTERPRETATION:  TIME OF EXAM: May 16, 2023 at 8:10 PM.    CLINICAL INFORMATION: Chronic respiratory failure. Tracheostomy.    COMPARISON:  None available    TECHNIQUE:   AP Portable chest x-ray.    INTERPRETATION:    The heart is not enlarged. The thoracic aorta is calcified.  Tracheostomy tube is in place.  Right IJ catheter with tip in the right atrium.  Right upper extremity PICC line. Tip obscured by right IJ catheter. The   line extends at least to the SVC.  There are bilateral perihilar opacities, more extensive on the right.  There are bilateral small pleural effusions with likely associated   passive atelectasis.  No pneumothorax is seen.  No acute bony abnormality is noted.      IMPRESSION:  Lines as above.    Bilateral perihilar opacities, more extensive on the right, that could be   due to pulmonary edema or multifocal pneumonia.    Bilateral small pleural effusions with likely associated passive   atelectasis.    < end of copied text >  < from: Xray Foot AP + Lateral + Oblique, Right (23 @ 15:36) >    ACC: [id number] EXAM:  XR FOOT COMP MIN 3 VIEWS RT   ORDERED BY: AMAN BAEZA     PROCEDURE DATE:  2023          INTERPRETATION:  RIGHT foot    CLINICAL INFORMATION: Hallux and heel wound TECHNIQUE: AP,lateral and   oblique views.    FINDINGS: Heel soft tissue subcutaneous air adjacent to intact calcaneal   tuberosity.  RIGHT hallux are radiographically intact.  Second and third hammertoe deformities.  Remaining osseous and joint structures of the RIGHT foot are   radiographicallyintact.  The bones and joint spaces are radiographically intact.  No fracture or dislocation.  Soft tissues unremarkable.    IMPRESSION:    Heel subcutaneous air without radiographic evidence of calcaneal   tuberosity osteolysis/osteomyelitis.  Firsthallux radiographically intact..  If osteomyelitis is clinically considered  despite conservative therapy,   and soft tissue / bone infection requires further assessment, follow-up   MRI recommended.    --- End of Report ---      < end of copied text >    Advanced directives addressed: full resuscitation
Date of Consult: 5/17/23  HPI: 76 yo male with hx of DM, HTN, HLD, who presented initially to an outside hospital in [place], [place] on 3/1/23 with chest pain and lightheadedness after doing yard work, found to have an inferiolateral ST elevations.  He was taken to the cath lab and underwent PCI with MEDINA placement to mLCX (Resolute [place] 4 x 26mm), dLAD (Resolute [place] 2.5 x 15mm and 2.25 x 12mm), and Impella CP was placed.   Patient was cannulated for peripheral VA ECMO on 3/2 and decannulated on 3/9.  Impella was removed 3/10.  Hospital course was complicated by multiple watershed strokes, acute respiratory failure requiring tracheostomy, BARBRA requiring renal replacement therapy, upper GI bleed, massive hemoptysis originating from lingula requiring bronchial blocker and bronchial artery embolization, pseudomonas bacteremia and septic shock.    Patient was eventually transported from [facility] on 5/16 to [facility] CCU with a tracheostomy, PEG, tunneled right sided HD catheter, right sided PICC line, rectal tube, and ostomy bag over his penis for urine collection.     (16 May 2023 16:49)    Podiatry consulted for R foot evaluation. Pt at bedside, non verbal, family present. Per family, they report wounds being present for approx 3-4wks. Family admits to noticing yellow drainage from R big toe. Pt no aware pt missing R big toe nail. No additional pedal complaints reported at this time.       PMH: STEMI (ST elevation myocardial infarction)    Cardiogenic shock    Stroke    History of chronic respiratory failure      PSH:    Allergies:No Known Allergies      Labs:                          8.6    8.46  )-----------( 221      ( 17 May 2023 05:23 )             28.3     WBC Trend  8.46 Date (05-17 @ 05:23)  8.95 Date (05-16 @ 16:53)      Chem  05-17    145  |  114<H>  |  43<H>  ----------------------------<  142<H>  4.5   |  25  |  1.36<H>    Ca    9.4      17 May 2023 05:23  Phos  2.8     05-17  Mg     1.9     05-17    TPro  6.9  /  Alb  3.1<L>  /  TBili  1.1  /  DBili  x   /  AST  37  /  ALT  25  /  AlkPhos  105  05-16          T(F): 97.8 (05-17-23 @ 13:00), Max: 98.3 (05-17-23 @ 08:00)  HR: 100 (05-17-23 @ 13:00) (72 - 112)  BP: 111/59 (05-17-23 @ 13:00) (87/46 - 124/64)  RR: 27 (05-17-23 @ 13:00) (18 - 27)  SpO2: 100% (05-17-23 @ 13:00) (96% - 100%)  Wt(kg): --    REVIEW OF SYSTEMS:    CONSTITUTIONAL: No weakness, fevers or chills  EYES/ENT: No visual changes;  No vertigo or throat pain   NECK: No pain or stiffness  RESPIRATORY: No cough, wheezing, hemoptysis; No shortness of breath  CARDIOVASCULAR: No chest pain or palpitations  GASTROINTESTINAL: No abdominal or epigastric pain. No nausea, vomiting, or hematemesis; No diarrhea or constipation. No melena or hematochezia.  GENITOURINARY: No dysuria, frequency or hematuria  NEUROLOGICAL: No numbness or weakness  SKIN: See physical examination.  All other review of systems is negative unless indicated above    Physical Exam:   Constitutional: NAD, alert;  Derm:  Skin warm, dry and supple bilateral.    Wound to nail bed of dorsal Left hallux with absent nail, superficial in nature, no hyperkeratotic border, wound base fibrogranular, wound size (approx 2.8 cm X 2cm), no edema, no purulence, no fluctuance, no tracking/tunneling, no probe to bone, mild sanguinous discharge distal to eponychium, eschar noted to medial distal hallux and at eponychium.   Pressure stage 2 wound to Right heel, no PTB, no crepitus, no fluctuance, superficial in nature with pink wound bed.   Vascular: Dorsalis Pedis and Posterior Tibial pulses non palpable.  Capillary refill delayed bilateral.    Neuro: Unable to access due to pt condition  MSK: Unable to access due to pt condition  
Reason for Admission: Transfer from [facility]  History of Present Illness:   78 yo male with hx of DM, HTN, HLD, who presented initially to an outside hospital in [place], [place] on 3/1/23 with chest pain and lightheadedness after doing yard work, found to have an inferiolateral ST elevations.  He was taken to the cath lab and underwent PCI with MEDINA placement to mLCX (Resolute [place] 4 x 26mm), dLAD (Resolute [place] 2.5 x 15mm and 2.25 x 12mm), and Impella CP was placed.   Patient was cannulated for peripheral VA ECMO on 3/2 and decannulated on 3/9.  Impella was removed 3/10.  Hospital course was complicated by multiple watershed strokes, acute respiratory failure requiring tracheostomy, BARBRA requiring renal replacement therapy, upper GI bleed, massive hemoptysis originating from lingula requiring bronchial blocker and bronchial artery embolization, pseudomonas bacteremia and septic shock.    Patient was eventually transported from [facility] on  to [facility] CCU with a tracheostomy, PEG, tunneled right sided HD catheter, right sided PICC line, rectal tube, and ostomy bag over his penis for urine collection.        Today renal eval called for hx of BARBRA on RRT     pt is alert and waving at me   tung Mohan as well regarding above  pt requiring CRRT and then iHD during extended [place] stay - generally HD TTS    last HD was held on 5/15 in [place]    pt had been making urine apparently on intermittent Bumex IV to promote UOP    per son - last HD was on 5/10         PAST MEDICAL & SURGICAL HISTORY:  STEMI (ST elevation myocardial infarction)  Cardiogenic shock  Stroke  History of chronic respiratory failure        MEDICATIONS  (STANDING):  aMIOdarone    Tablet 200 milliGRAM(s) Oral daily  ascorbic acid 500 milliGRAM(s) Oral daily  cefTRIAXone Injectable. 1000 milliGRAM(s) IV Push every 24 hours  chlorhexidine 0.12% Liquid 15 milliLiter(s) Oral Mucosa every 12 hours  chlorhexidine 4% Liquid 1 Application(s) Topical <User Schedule>  cholecalciferol 2000 Unit(s) Oral daily  clopidogrel Tablet 75 milliGRAM(s) Enteral Tube daily  heparin   Injectable 5000 Unit(s) SubCutaneous every 12 hours  melatonin 5 milliGRAM(s) Oral at bedtime  midodrine 10 milliGRAM(s) Oral every 8 hours  nystatin Powder 1 Application(s) Topical two times a day  pantoprazole  Injectable 40 milliGRAM(s) IV Push daily  QUEtiapine 50 milliGRAM(s) Oral at bedtime      Allergies    No Known Allergies    Intolerances        SOCIAL HISTORY:  Denies ETOh,Smoking,     FAMILY HISTORY:      REVIEW OF SYSTEMS:  UTO sec to trach       Vital Signs Last 24 Hrs  T(C): 36.7 (17 May 2023 04:00), Max: 36.8 (16 May 2023 16:32)  T(F): 98.1 (17 May 2023 04:00), Max: 98.2 (16 May 2023 16:32)  HR: 91 (17 May 2023 07:00) (72 - 104)  BP: 108/56 (17 May 2023 07:00) (87/46 - 108/56)  BP(mean): 69 (17 May 2023 07:00) (56 - 69)  RR: 20 (17 May 2023 07:00) (18 - 23)  SpO2: 100% (17 May 2023 07:00) (100% - 100%)    Parameters below as of 17 May 2023 06:00  Patient On (Oxygen Delivery Method): ventilator    I&O's Detail    16 May 2023 07:01  -  17 May 2023 07:00  --------------------------------------------------------  IN:    Free Water: 100 mL    Vital1.5: 310 mL  Total IN: 410 mL    OUT:    Rectal Tube (mL): 100 mL    Voided (mL): 0 mL  Total OUT: 100 mL    Total NET: 310 mL        PHYSICAL EXAM:    Constitutional: alert, weak, frail   HEENT:  EOMI,  MM  Neck: No LAD, No JVD  Respiratory: poor AE  Cardiovascular: S1 and S2  Gastrointestinal: BS+, soft, NT/ND  Extremities: No peripheral edema  Neurological: A/O   : No Recio  Skin: No rashes  Access: Not applicable    LABS:                                   8.6    8.46  )-----------( 221      ( 17 May 2023 05:23 )             28.3                         8.8    8.95  )-----------( 217      ( 16 May 2023 16:53 )             28.5         145    |  114    |  43     ----------------------------<  142       17 May 2023 05:23  4.5     |  25     |  1.36     143    |  113    |  43     ----------------------------<  118       16 May 2023 16:53  4.7     |  26     |  1.51     Ca    9.4        17 May 2023 05:23  Ca    9.6        16 May 2023 16:53    Phos  2.8       17 May 2023 05:23  Phos  2.8       16 May 2023 16:53    Mg     1.9       17 May 2023 05:23  Mg     1.9       16 May 2023 16:53    TPro  6.9    /  Alb  3.1    /  TBili  1.1    /        16 May 2023 16:53  DBili  x      /  AST  37     /  ALT  25     /  AlkPhos  105          Urine Studies:  Urinalysis Basic - ( 16 May 2023 18:00 )    Color: Yellow / Appearance: Clear / S.015 / pH: x  Gluc: x / Ketone: Negative  / Bili: Negative / Urobili: Negative   Blood: x / Protein: Negative / Nitrite: Negative   Leuk Esterase: Moderate / RBC: 0-2 /HPF / WBC 6-10 /HPF   Sq Epi: x / Non Sq Epi: x / Bacteria: TNTC            RADIOLOGY & ADDITIONAL STUDIES:

## 2023-05-19 NOTE — PROGRESS NOTE ADULT - ASSESSMENT
A/P: 77y Male with scrotal excoriation and secondary infection    Ck am labs  Continue Cefepime  Continue dressing changes as per wound care and scrotal elevation  No need for urology intervention at this time as pelvic CT shows no fluid collection or scrotal perineal soft tissue gas.  Above discussed with Dr. Gallego

## 2023-05-19 NOTE — PROGRESS NOTE ADULT - SUBJECTIVE AND OBJECTIVE BOX
Patient evaluated measured fitted for bilateral  static positional  ankle foot resting orthoses  set in neutral position to maintain foot ankle  position  prevent foot drop bilaterally also  can assist in weight bearing and physical therapy  ordered today delivered by [facility] [phone]

## 2023-05-19 NOTE — PROGRESS NOTE ADULT - SUBJECTIVE AND OBJECTIVE BOX
Date of Service: 5/19/23  HPI: 76 yo male with hx of DM, HTN, HLD, who presented initially to an outside hospital in [place], [place] on 3/1/23 with chest pain and lightheadedness after doing yard work, found to have an inferiolateral ST elevations.  He was taken to the cath lab and underwent PCI with MEDINA placement to mLCX (Resolute [place] 4 x 26mm), dLAD (Resolute [place] 2.5 x 15mm and 2.25 x 12mm), and Impella CP was placed.   Patient was cannulated for peripheral VA ECMO on 3/2 and decannulated on 3/9.  Impella was removed 3/10.  Hospital course was complicated by multiple watershed strokes, acute respiratory failure requiring tracheostomy, BARBRA requiring renal replacement therapy, upper GI bleed, massive hemoptysis originating from lingula requiring bronchial blocker and bronchial artery embolization, pseudomonas bacteremia and septic shock.    Patient was eventually transported from [facility] on 5/16 to [facility] CCU with a tracheostomy, PEG, tunneled right sided HD catheter, right sided PICC line, rectal tube, and ostomy bag over his penis for urine collection.     (16 May 2023 16:49)    Podiatry consulted for R foot evaluation. Pt at bedside, non verbal, family present. Per family, they report wounds being present for approx 3-4wks. Family admits to noticing yellow drainage from R big toe. Pt no aware pt missing R big toe nail. No additional pedal complaints reported at this time.     5/19/23: Pt seen by Podiatry team with attending present for follow up of R foot wounds. Pt awake and alert, family at bedside.     PMH: STEMI (ST elevation myocardial infarction)    Cardiogenic shock    Stroke    History of chronic respiratory failure      PSH:    Allergies:No Known Allergies      Labs:                          10.7   7.28  )-----------( 263      ( 18 May 2023 05:42 )             35.5     05-18    146<H>  |  114<H>  |  37<H>  ----------------------------<  131<H>  4.0   |  26  |  1.32<H>    Ca    10.4<H>      18 May 2023 05:42  Phos  4.1     05-18  Mg     2.0     05-18        Vital Signs Last 24 Hrs  T(C): 37.2 (19 May 2023 09:14), Max: 37.3 (18 May 2023 21:40)  T(F): 98.9 (19 May 2023 09:14), Max: 99.2 (18 May 2023 21:40)  HR: 109 (19 May 2023 16:00) (98 - 124)  BP: 117/65 (19 May 2023 16:00) (93/64 - 127/66)  BP(mean): 77 (19 May 2023 16:00) (59 - 82)  RR: 25 (19 May 2023 16:00) (18 - 38)  SpO2: 100% (19 May 2023 16:00) (94% - 100%)    Parameters below as of 19 May 2023 16:00  Patient On (Oxygen Delivery Method): tracheostomy collar      REVIEW OF SYSTEMS:    CONSTITUTIONAL: No weakness, fevers or chills  EYES/ENT: No visual changes;  No vertigo or throat pain   NECK: No pain or stiffness  RESPIRATORY: No cough, wheezing, hemoptysis; No shortness of breath  CARDIOVASCULAR: No chest pain or palpitations  GASTROINTESTINAL: No abdominal or epigastric pain. No nausea, vomiting, or hematemesis; No diarrhea or constipation. No melena or hematochezia.  GENITOURINARY: No dysuria, frequency or hematuria  NEUROLOGICAL: No numbness or weakness  SKIN: See physical examination.  All other review of systems is negative unless indicated above    Physical Exam:   Constitutional: NAD, alert;  Derm:  Skin warm, dry and supple bilateral.    Wound to nail bed of dorsal Left hallux with absent nail, superficial in nature, no hyperkeratotic border, wound base fibrogranular, wound size (approx 2.8 cm X 2cm), no edema, no purulence, no fluctuance, no tracking/tunneling, no probe to bone, mild sanguinous discharge distal to eponychium, eschar noted to medial distal hallux and at eponychium.   Pressure stage 2 wound to Right heel, no PTB, no crepitus, no fluctuance, superficial in nature with pink wound bed.   Vascular: Dorsalis Pedis and Posterior Tibial pulses non palpable.  Capillary refill delayed bilateral.    Neuro: Unable to access due to pt condition  MSK: Unable to access due to pt condition        < from: Xray Foot AP + Lateral + Oblique, Right (05.17.23 @ 15:36) >  INTERPRETATION:  RIGHT foot    CLINICAL INFORMATION: Hallux and heel wound TECHNIQUE: AP,lateral and   oblique views.    FINDINGS: Heel soft tissue subcutaneous air adjacent to intact calcaneal   tuberosity.  RIGHT hallux are radiographically intact.  Second and third hammertoe deformities.  Remaining osseous and joint structures of the RIGHT foot are   radiographicallyintact.  The bones and joint spaces are radiographically intact.  No fracture or dislocation.  Soft tissues unremarkable.    IMPRESSION:    Heel subcutaneous air without radiographic evidence of calcaneal   tuberosity osteolysis/osteomyelitis.  Firsthallux radiographically intact..  If osteomyelitis is clinically considered  despite conservative therapy,   and soft tissue / bone infection requires further assessment, follow-up   MRI recommended.    --- End of Report ---    < end of copied text >

## 2023-05-20 DIAGNOSIS — N49.2 INFLAMMATORY DISORDERS OF SCROTUM: ICD-10-CM

## 2023-05-20 LAB
ANION GAP SERPL CALC-SCNC: 8 MMOL/L — SIGNIFICANT CHANGE UP (ref 5–17)
BASOPHILS # BLD AUTO: 0.04 K/UL — SIGNIFICANT CHANGE UP (ref 0–0.2)
BASOPHILS NFR BLD AUTO: 0.4 % — SIGNIFICANT CHANGE UP (ref 0–2)
BUN SERPL-MCNC: 26 MG/DL — HIGH (ref 7–23)
CALCIUM SERPL-MCNC: 9.5 MG/DL — SIGNIFICANT CHANGE UP (ref 8.5–10.1)
CHLORIDE SERPL-SCNC: 115 MMOL/L — HIGH (ref 96–108)
CO2 SERPL-SCNC: 28 MMOL/L — SIGNIFICANT CHANGE UP (ref 22–31)
CREAT SERPL-MCNC: 1.08 MG/DL — SIGNIFICANT CHANGE UP (ref 0.5–1.3)
EGFR: 72 ML/MIN/1.73M2 — SIGNIFICANT CHANGE UP
EOSINOPHIL # BLD AUTO: 0.14 K/UL — SIGNIFICANT CHANGE UP (ref 0–0.5)
EOSINOPHIL NFR BLD AUTO: 1.5 % — SIGNIFICANT CHANGE UP (ref 0–6)
GLUCOSE SERPL-MCNC: 177 MG/DL — HIGH (ref 70–99)
HCT VFR BLD CALC: 28.5 % — LOW (ref 39–50)
HGB BLD-MCNC: 8.7 G/DL — LOW (ref 13–17)
IMM GRANULOCYTES NFR BLD AUTO: 0.7 % — SIGNIFICANT CHANGE UP (ref 0–0.9)
LYMPHOCYTES # BLD AUTO: 1.91 K/UL — SIGNIFICANT CHANGE UP (ref 1–3.3)
LYMPHOCYTES # BLD AUTO: 20.8 % — SIGNIFICANT CHANGE UP (ref 13–44)
MCHC RBC-ENTMCNC: 30.5 GM/DL — LOW (ref 32–36)
MCHC RBC-ENTMCNC: 30.6 PG — SIGNIFICANT CHANGE UP (ref 27–34)
MCV RBC AUTO: 100.4 FL — HIGH (ref 80–100)
MONOCYTES # BLD AUTO: 0.52 K/UL — SIGNIFICANT CHANGE UP (ref 0–0.9)
MONOCYTES NFR BLD AUTO: 5.7 % — SIGNIFICANT CHANGE UP (ref 2–14)
NEUTROPHILS # BLD AUTO: 6.5 K/UL — SIGNIFICANT CHANGE UP (ref 1.8–7.4)
NEUTROPHILS NFR BLD AUTO: 70.9 % — SIGNIFICANT CHANGE UP (ref 43–77)
PLATELET # BLD AUTO: 248 K/UL — SIGNIFICANT CHANGE UP (ref 150–400)
POTASSIUM SERPL-MCNC: 3.4 MMOL/L — LOW (ref 3.5–5.3)
POTASSIUM SERPL-SCNC: 3.4 MMOL/L — LOW (ref 3.5–5.3)
RBC # BLD: 2.84 M/UL — LOW (ref 4.2–5.8)
RBC # FLD: 16.5 % — HIGH (ref 10.3–14.5)
SODIUM SERPL-SCNC: 151 MMOL/L — HIGH (ref 135–145)
WBC # BLD: 9.17 K/UL — SIGNIFICANT CHANGE UP (ref 3.8–10.5)
WBC # FLD AUTO: 9.17 K/UL — SIGNIFICANT CHANGE UP (ref 3.8–10.5)

## 2023-05-20 PROCEDURE — 99233 SBSQ HOSP IP/OBS HIGH 50: CPT

## 2023-05-20 RX ORDER — POTASSIUM CHLORIDE 20 MEQ
40 PACKET (EA) ORAL ONCE
Refills: 0 | Status: COMPLETED | OUTPATIENT
Start: 2023-05-20 | End: 2023-05-20

## 2023-05-20 RX ORDER — SODIUM CHLORIDE 9 MG/ML
500 INJECTION, SOLUTION INTRAVENOUS ONCE
Refills: 0 | Status: COMPLETED | OUTPATIENT
Start: 2023-05-20 | End: 2023-05-20

## 2023-05-20 RX ADMIN — Medication 100 MILLIGRAM(S): at 11:04

## 2023-05-20 RX ADMIN — CEFEPIME 1000 MILLIGRAM(S): 1 INJECTION, POWDER, FOR SOLUTION INTRAMUSCULAR; INTRAVENOUS at 17:31

## 2023-05-20 RX ADMIN — OXYCODONE HYDROCHLORIDE 2.5 MILLIGRAM(S): 5 TABLET ORAL at 11:03

## 2023-05-20 RX ADMIN — Medication 1 APPLICATION(S): at 12:46

## 2023-05-20 RX ADMIN — NYSTATIN CREAM 1 APPLICATION(S): 100000 CREAM TOPICAL at 11:41

## 2023-05-20 RX ADMIN — Medication 5 MILLIGRAM(S): at 22:25

## 2023-05-20 RX ADMIN — NYSTATIN CREAM 1 APPLICATION(S): 100000 CREAM TOPICAL at 22:28

## 2023-05-20 RX ADMIN — Medication 1 APPLICATION(S): at 11:40

## 2023-05-20 RX ADMIN — SODIUM CHLORIDE 1000 MILLILITER(S): 9 INJECTION, SOLUTION INTRAVENOUS at 20:00

## 2023-05-20 RX ADMIN — QUETIAPINE FUMARATE 50 MILLIGRAM(S): 200 TABLET, FILM COATED ORAL at 22:24

## 2023-05-20 RX ADMIN — HEPARIN SODIUM 5000 UNIT(S): 5000 INJECTION INTRAVENOUS; SUBCUTANEOUS at 11:03

## 2023-05-20 RX ADMIN — CEFEPIME 1000 MILLIGRAM(S): 1 INJECTION, POWDER, FOR SOLUTION INTRAMUSCULAR; INTRAVENOUS at 05:34

## 2023-05-20 RX ADMIN — CHLORHEXIDINE GLUCONATE 1 APPLICATION(S): 213 SOLUTION TOPICAL at 10:58

## 2023-05-20 RX ADMIN — AMIODARONE HYDROCHLORIDE 200 MILLIGRAM(S): 400 TABLET ORAL at 11:04

## 2023-05-20 RX ADMIN — CHLORHEXIDINE GLUCONATE 15 MILLILITER(S): 213 SOLUTION TOPICAL at 22:25

## 2023-05-20 RX ADMIN — MIDODRINE HYDROCHLORIDE 10 MILLIGRAM(S): 2.5 TABLET ORAL at 05:33

## 2023-05-20 RX ADMIN — HEPARIN SODIUM 5000 UNIT(S): 5000 INJECTION INTRAVENOUS; SUBCUTANEOUS at 22:24

## 2023-05-20 RX ADMIN — Medication 2000 UNIT(S): at 11:03

## 2023-05-20 RX ADMIN — Medication 500 MILLIGRAM(S): at 11:04

## 2023-05-20 RX ADMIN — CLOPIDOGREL BISULFATE 75 MILLIGRAM(S): 75 TABLET, FILM COATED ORAL at 11:04

## 2023-05-20 RX ADMIN — CHLORHEXIDINE GLUCONATE 15 MILLILITER(S): 213 SOLUTION TOPICAL at 10:59

## 2023-05-20 RX ADMIN — Medication 40 MILLIEQUIVALENT(S): at 11:03

## 2023-05-20 RX ADMIN — PANTOPRAZOLE SODIUM 40 MILLIGRAM(S): 20 TABLET, DELAYED RELEASE ORAL at 11:03

## 2023-05-20 NOTE — SWALLOW BEDSIDE ASSESSMENT ADULT - SWALLOW EVAL: ORAL MUSCULATURE
[facility]    PATIENT'S NAME: YOVANA, [de-identified] J   ATTENDING PHYSICIAN: Chi Jain MD   OPERATING PHYSICIAN: Shelly Jain MD   PATIENT ACCOUNT#:   [id number]    LOCATION:  [address]  MEDICAL RECORD #:   [id number]       YOB: 1947  ADMISSION DATE:       03/13/2023      OPERATION DATE:  03/13/2023    OPERATIVE REPORT      PREOPERATIVE DIAGNOSIS:  Nasopharyngeal abnormality. POSTOPERATIVE DIAGNOSIS:  Nasopharyngeal abnormality. PROCEDURE:  Nasal endoscopy. ANESTHESIA:  General endotracheal anesthesia. ESTIMATED BLOOD LOSS:  Less than 5 mL. INDICATIONS:  Patient presents with a history of a finding of what may be a nasopharyngeal mass on MRI with subsequent nasal endoscopy in the office demonstrating what appeared to be some hypervascular mucosa on the left. Nasal endoscopy is indicated. OPERATIVE TECHNIQUE:  Patient was taken to the operating room, placed in a supine position. Following the induction of general endotracheal anesthesia, examination of the nasal cavity demonstrated a septal deviation to the left. The nasal mucosa was swabbed with 4% cocaine, and a 0 degree [place] endoscope was then used to examine the nasopharynx on the right, demonstrating no significant abnormalities, just some perhaps typical lymphoid tissues. The left nasal cavity was examined, demonstrating no abnormalities of the entire nasal cavity bilaterally, followed by examination of the nasopharynx, demonstrating no abnormal mucosal tissue. This appeared to be normal mucosa with no significant hypervascularity noted. Decision was made not to take any biopsies since the original MRI had been performed in September of last year and no significant structures that were abnormal were noted within the nasopharynx. The patient was subsequently extubated, awakened, and taken to the  recovery room with photodocumentation of the nasopharynx being taken intraoperatively.     Dictated By Harpal Haider Demetrius Lassiter MD  d: 03/13/2023 11:22:09  t: 03/13/2023 16:30:12  [facility] [id number]  JDO/ unable to assess due to poor participation/comprehension

## 2023-05-20 NOTE — PROGRESS NOTE ADULT - SUBJECTIVE AND OBJECTIVE BOX
Date of Service: 5/20/23  HPI: 76 yo male with hx of DM, HTN, HLD, who presented initially to an outside hospital in [place], [place] on 3/1/23 with chest pain and lightheadedness after doing yard work, found to have an inferiolateral ST elevations.  He was taken to the cath lab and underwent PCI with MEDINA placement to mLCX (Resolute [place] 4 x 26mm), dLAD (Resolute [place] 2.5 x 15mm and 2.25 x 12mm), and Impella CP was placed.   Patient was cannulated for peripheral VA ECMO on 3/2 and decannulated on 3/9.  Impella was removed 3/10.  Hospital course was complicated by multiple watershed strokes, acute respiratory failure requiring tracheostomy, BARBRA requiring renal replacement therapy, upper GI bleed, massive hemoptysis originating from lingula requiring bronchial blocker and bronchial artery embolization, pseudomonas bacteremia and septic shock.    Patient was eventually transported from [facility] on 5/16 to [facility] CCU with a tracheostomy, PEG, tunneled right sided HD catheter, right sided PICC line, rectal tube, and ostomy bag over his penis for urine collection.     (16 May 2023 16:49)    Podiatry consulted for R foot evaluation. Pt at bedside, non verbal, family present. Per family, they report wounds being present for approx 3-4wks. Family admits to noticing yellow drainage from R big toe. Pt no aware pt missing R big toe nail. No additional pedal complaints reported at this time.     5/20/23: Pt seen by Podiatry team with attending present for follow up of R foot wounds. Pt awake and alert, family at bedside.     PMH: STEMI (ST elevation myocardial infarction)    Cardiogenic shock    Stroke    History of chronic respiratory failure      PSH:    Allergies:No Known Allergies      Labs:                        8.7    9.17  )-----------( 248      ( 20 May 2023 05:21 )             28.5     05-20    151<H>  |  115<H>  |  26<H>  ----------------------------<  177<H>  3.4<L>   |  28  |  1.08    Ca    9.5      20 May 2023 05:21        MEDICATIONS  (STANDING):  aMIOdarone    Tablet 200 milliGRAM(s) Oral daily  ascorbic acid 500 milliGRAM(s) Oral daily  cadexomer iodine 0.9% Gel 1 Application(s) Topical daily  cefepime  Injectable. 1000 milliGRAM(s) IV Push every 12 hours  cefepime  Injectable.      chlorhexidine 0.12% Liquid 15 milliLiter(s) Oral Mucosa every 12 hours  chlorhexidine 4% Liquid 1 Application(s) Topical <User Schedule>  cholecalciferol 2000 Unit(s) Oral daily  clopidogrel Tablet 75 milliGRAM(s) Enteral Tube daily  collagenase Ointment 1 Application(s) Topical daily  heparin   Injectable 5000 Unit(s) SubCutaneous every 12 hours  melatonin 5 milliGRAM(s) Oral at bedtime  midodrine 10 milliGRAM(s) Oral every 8 hours  nystatin Powder 1 Application(s) Topical two times a day  pantoprazole  Injectable 40 milliGRAM(s) IV Push daily  QUEtiapine 50 milliGRAM(s) Oral at bedtime  thiamine 100 milliGRAM(s) Oral daily    MEDICATIONS  (PRN):  HYDROmorphone  Injectable 1 milliGRAM(s) IV Push every 4 hours PRN Severe Pain (7 - 10)  oxyCODONE    IR 2.5 milliGRAM(s) Oral every 6 hours PRN moderate to severe pain             Vital Signs Last 24 Hrs  T(C): 37 (20 May 2023 05:00), Max: 37.2 (19 May 2023 22:00)  T(F): 98.6 (20 May 2023 05:00), Max: 98.9 (19 May 2023 22:00)  HR: 111 (20 May 2023 09:00) (101 - 121)  BP: 130/63 (20 May 2023 08:00) (108/61 - 130/63)  BP(mean): 79 (20 May 2023 08:00) (72 - 83)  RR: 24 (20 May 2023 09:00) (13 - 33)  SpO2: 97% (20 May 2023 06:00) (95% - 100%)    Parameters below as of 20 May 2023 05:49  Patient On (Oxygen Delivery Method): tracheostomy collar  O2 Flow (L/min): 10  O2 Concentration (%): 28      Physical Exam:   Constitutional: NAD, alert;  Derm:  Skin warm, dry and supple bilateral.    Wound to nail bed of dorsal Left hallux with absent nail, superficial in nature, no hyperkeratotic border, wound base fibrogranular, wound size (approx 2.8 cm X 2cm), no edema, no purulence, no fluctuance, no tracking/tunneling, no probe to bone, mild sanguinous discharge distal to eponychium, eschar noted to medial distal hallux and at eponychium.   Pressure stage 2 wound to Right heel, no PTB, no crepitus, no fluctuance, superficial in nature with pink wound bed.   Vascular: Dorsalis Pedis and Posterior Tibial pulses non palpable.  Capillary refill delayed bilateral.    Neuro: Unable to access due to pt condition  MSK: Unable to access due to pt condition        < from: Xray Foot AP + Lateral + Oblique, Right (05.17.23 @ 15:36) >  INTERPRETATION:  RIGHT foot    CLINICAL INFORMATION: Hallux and heel wound TECHNIQUE: AP,lateral and   oblique views.    FINDINGS: Heel soft tissue subcutaneous air adjacent to intact calcaneal   tuberosity.  RIGHT hallux are radiographically intact.  Second and third hammertoe deformities.  Remaining osseous and joint structures of the RIGHT foot are   radiographicallyintact.  The bones and joint spaces are radiographically intact.  No fracture or dislocation.  Soft tissues unremarkable.    IMPRESSION:    Heel subcutaneous air without radiographic evidence of calcaneal   tuberosity osteolysis/osteomyelitis.  Firsthallux radiographically intact..  If osteomyelitis is clinically considered  despite conservative therapy,   and soft tissue / bone infection requires further assessment, follow-up   MRI recommended.    --- End of Report ---    < end of copied text >

## 2023-05-20 NOTE — SWALLOW BEDSIDE ASSESSMENT ADULT - PHARYNGEAL PHASE
Mild latency in onset of swallow and  laryngeal lift was reduced, likely 2/2 to drag from trache hardware. No immediate overt s/s aspiration over 2 bolus trials of water, and 6 1/2 teaspoon bolus trials of moderately thick material.

## 2023-05-20 NOTE — PROGRESS NOTE ADULT - ASSESSMENT
A: 78yo Male seen for the following:  -Partial thickness wound to R hallux  -Partial thickness wound to R heel  -Diabetes Milletus type 2  -Difficulty with ambulation    P:   -Chart reviewed and Patient evaluated;  -Discussed diagnosis and treatment with patient.  -Xrays of R foot reviewed: on wet read showing no acute cortical demineralization, noted subtle radiolucency by subcutaneous tissue of posterior heel.   -Wound flush with normal saline. Official impression noted above.   -Applied betadine to Right hallux and xeroform to R heel with DSD  -Wound to Right hallux is superficial in nature with scant sanguinous discharge on evaluation and surrounding eschar tissue, no pus/purulence, no PTB, stable at this time.   Wound to Right heel is superficial in nature with pink wound bed, no fluctuance, no crepitus, no PTB, stable at this time.   -WC: idosorb and DSD  -Offloading of bilateral heels with CAIR boots or while bedbound to reduce further tissue damage   -5/18- on xray noted subtle air by subcutaneous tissue at posterior heel. Re-assessed heel wound at bedside today evening. No fluctuance, no crepitus, no PTB. The subcutaneous air noted on xray is air from wound.   -Will continue to monitor and continue local wound care. Will repeat R foot xrays over the weekend.   -Continue with IV antibiotics As Per Med  -All additional care per Med appreciated  -Patient tolerated interventions well without any complications.   -Podiatry will follow while in house.

## 2023-05-20 NOTE — SWALLOW BEDSIDE ASSESSMENT ADULT - ORAL PREPARATORY PHASE
oral acceptance with lip seal on spoon, oral containment; no lateral or anterior loss./Within functional limits

## 2023-05-20 NOTE — SWALLOW BEDSIDE ASSESSMENT ADULT - COMMENTS
As per H and P;  78 y/o male with PMH HTN, HLD, DM2, recent prolonged hospital course in [place], [place] after a IWMI (underwent PCI with MEDINA to LCx) complicated by cardiogenic shock requiring ECMO, acute hypoxic resp failure requiring trach, dysphagia requiring PEG, BARBRA requiring dialysis, pA.fib, acute CVA, septic shock from Pseudomonas bacteremia, hemoptysis, GI bleed .     Pt is trached and has PG tube.  Service is consulted for evaluation of Pt for po intake.  pt is apparently asking for water. As per H and P;  76 y/o male with PMH HTN, HLD, DM2, recent prolonged hospital course in [place], [place] after a IWMI (underwent PCI with MEDINA to LCx) complicated by cardiogenic shock requiring ECMO, acute hypoxic resp failure requiring trach, dysphagia requiring PEG, BARBRA requiring dialysis, pA.fib, acute CVA, septic shock from Pseudomonas bacteremia, hemoptysis, GI bleed .     Pt is trached and has PG tube.  Service is consulted for evaluation of Pt for po intake.  pt is apparently asking for water.    pt is Mandarin speaking, but has some English facility, Son was present at the evaluation and able to translate but pt appears to understand quite a lot.  Pt is on a trach collar, cuff is deflated.

## 2023-05-20 NOTE — SWALLOW BEDSIDE ASSESSMENT ADULT - SWALLOW EVAL: DIAGNOSIS
On encounter, Pt as semi reclining in bed; awake though lying very still. family was present, and translated the procedure for Pt into Mandarin. On encounter, Pt was semi reclining in bed; awake though lying very still. family was present, and translated the procedure for Pt into Mandarin..  Pt has sufficnet oral-pha yrngeal skills to star po at the level of PUREE and MODERATELY THICK LIQUIDS, IF HE HAS NO OVERT S/S ASPIRATION OF TEST MATERIAL SUCTIONED OR COUGHED FROM TRACH STOMA OVER THE NEXT FIVE HOURS.  Pt can make a voice, even with stoma open, and has a stronger voice when stoma is lightly occluded.  ??Ready for capping??

## 2023-05-20 NOTE — PROGRESS NOTE ADULT - SUBJECTIVE AND OBJECTIVE BOX
Reason for Admission: Transfer from [facility]  78 yo male with hx of DM, HTN, HLD, who presented initially to an outside hospital in [place], [place] on 3/1/23 with chest pain and lightheadedness after doing yard work, found to have an inferiolateral ST elevations.  He was taken to the cath lab and underwent PCI with MEDINA placement to mLCX (Resolute [place] 4 x 26mm), dLAD (Resolute [place] 2.5 x 15mm and 2.25 x 12mm), and Impella CP was placed.   Patient was cannulated for peripheral VA ECMO on 3/2 and decannulated on 3/9.  Impella was removed 3/10.  Hospital course was complicated by multiple watershed strokes, acute respiratory failure requiring tracheostomy, BARBRA requiring renal replacement therapy, upper GI bleed, massive hemoptysis originating from lingula requiring bronchial blocker and bronchial artery embolization, pseudomonas bacteremia and septic shock.    Patient was eventually transported from [facility] on  to [facility] CCU with a tracheostomy, PEG, tunneled right sided HD catheter, right sided PICC line, rectal tube, and ostomy bag over his penis for urine collection.      renal eval called for hx of BARBRA on RRT    now resolved   on vent and trach       PAST MEDICAL & SURGICAL HISTORY:  STEMI (ST elevation myocardial infarction)  Cardiogenic shock  Stroke  History of chronic respiratory failure    MEDICATIONS  (STANDING):  aMIOdarone    Tablet 200 milliGRAM(s) Oral daily  ascorbic acid 500 milliGRAM(s) Oral daily  cadexomer iodine 0.9% Gel 1 Application(s) Topical daily  cefepime  Injectable. 1000 milliGRAM(s) IV Push every 12 hours  cefepime  Injectable.      chlorhexidine 0.12% Liquid 15 milliLiter(s) Oral Mucosa every 12 hours  chlorhexidine 4% Liquid 1 Application(s) Topical <User Schedule>  cholecalciferol 2000 Unit(s) Oral daily  clopidogrel Tablet 75 milliGRAM(s) Enteral Tube daily  collagenase Ointment 1 Application(s) Topical daily  heparin   Injectable 5000 Unit(s) SubCutaneous every 12 hours  melatonin 5 milliGRAM(s) Oral at bedtime  midodrine 10 milliGRAM(s) Oral every 8 hours  nystatin Powder 1 Application(s) Topical two times a day  pantoprazole  Injectable 40 milliGRAM(s) IV Push daily  potassium chloride   Powder 40 milliEquivalent(s) Enteral Tube once  QUEtiapine 50 milliGRAM(s) Oral at bedtime  thiamine 100 milliGRAM(s) Oral daily      Vital Signs Last 24 Hrs  T(C): 37 (20 May 2023 05:00), Max: 37.2 (19 May 2023 22:00)  T(F): 98.6 (20 May 2023 05:00), Max: 98.9 (19 May 2023 22:00)  HR: 111 (20 May 2023 09:00) (101 - 121)  BP: 130/63 (20 May 2023 08:00) (108/61 - 130/63)  BP(mean): 79 (20 May 2023 08:00) (70 - 83)  RR: 24 (20 May 2023 09:00) (13 - 33)  SpO2: 97% (20 May 2023 06:00) (95% - 100%)    Parameters below as of 20 May 2023 05:49  Patient On (Oxygen Delivery Method): tracheostomy collar  O2 Flow (L/min): 10  O2 Concentration (%): 28    I&O's Detail    19 May 2023 07:01  -  20 May 2023 07:00  --------------------------------------------------------  IN:    Free Water: 1000 mL    Vital1.5: 1150 mL  Total IN: 2150 mL    OUT:    Incontinent per Condom Catheter (mL): 1250 mL    Rectal Tube (mL): 100 mL  Total OUT: 1350 mL    Total NET: 800 mL      PHYSICAL EXAM:    Constitutional: alert, weak, frail   HEENT:  EOMI,  MM  Neck: No LAD, No JVD  Respiratory: poor AE  Cardiovascular: S1 and S2  Gastrointestinal: BS+, soft, NT/ND  Extremities: No peripheral edema  Neurological: A/O   : No Recio  Skin: No rashes  Access: Not applicable    LABS:                                              8.7    9.17  )-----------( 248      ( 20 May 2023 05:21 )             28.5           151    |  115    |  26     ----------------------------<  177       20 May 2023 05:21  3.4     |  28     |  1.08     146    |  114    |  37     ----------------------------<  131       18 May 2023 05:42  4.0     |  26     |  1.32     145    |  114    |  43     ----------------------------<  142       17 May 2023 05:23  4.5     |  25     |  1.36     Ca    9.5        20 May 2023 05:21  Ca    10.4       18 May 2023 05:42    Phos  4.1       18 May 2023 05:42  Phos  2.8       17 May 2023 05:23    Mg     2.0       18 May 2023 05:42  Mg     1.9       17 May 2023 05:23    TPro  6.9    /  Alb  3.1    /  TBili  1.1    /        16 May 2023 16:53  DBili  x      /  AST  37     /  ALT  25     /  AlkPhos  105                Urine Studies:  Urinalysis Basic - ( 16 May 2023 18:00 )    Color: Yellow / Appearance: Clear / S.015 / pH: x  Gluc: x / Ketone: Negative  / Bili: Negative / Urobili: Negative   Blood: x / Protein: Negative / Nitrite: Negative   Leuk Esterase: Moderate / RBC: 0-2 /HPF / WBC 6-10 /HPF   Sq Epi: x / Non Sq Epi: x / Bacteria: TNTC          RADIOLOGY & ADDITIONAL STUDIES:

## 2023-05-20 NOTE — PROGRESS NOTE ADULT - ASSESSMENT
82 yo male with extended complicated stay at [facility] , resides in [place], [place] .   pt w hx of HTN, DM, w 3/1/23 - STEMI required cath PCI to mLCCx and and dLAD required Impellar and ECMO.  pt was in BARBRA required CRRT and then transitioned to iHD since then TTS schedule but Cr been stable here .  per family last HD 5/10 , pt has TDC in place    BARBRA likey ATN related on HD -  last HD 5/10 as per son   BARBRA in recovery     TDC removal     Cr continues to be stable and making good UOP     monitor UOP      daily labs   no IV contrast or NSAID       Hypernatremia   free water flushes via PEG  - increased today      Will follow intermittenlty only

## 2023-05-20 NOTE — PROGRESS NOTE ADULT - SUBJECTIVE AND OBJECTIVE BOX
78 y/o male with PMH HTN, HLD, DM2, recent prolonged hospital course in [place], [place] after a IWMI (underwent PCI with MEDINA to LCx) complicated by cardiogenic shock requiring ECMO, acute hypoxic resp failure requiring trach, dysphagia requiring PEG, BARBRA requiring dialysis, pA.fib, acute CVA, septic shock from Pseudomonas bacteremia, hemoptysis, GI bleed     Now transferred to  for further care.  Case discussed on CCU rounds, still with pressor requirements.  On trach collar.  Case discussed on CCU rounds, with Nephrology and the family    Dialysis catheter removed  5/20  case discussed on CCU rounds, no acute changes.      ICU Vital Signs Last 24 Hrs  T(C): 37 (20 May 2023 05:00), Max: 37.2 (19 May 2023 22:00)  T(F): 98.6 (20 May 2023 05:00), Max: 98.9 (19 May 2023 22:00)  HR: 111 (20 May 2023 09:00) (101 - 121)  BP: 130/63 (20 May 2023 08:00) (108/61 - 130/63)  BP(mean): 79 (20 May 2023 08:00) (72 - 83)  RR: 24 (20 May 2023 09:00) (13 - 33)  SpO2: 97% (20 May 2023 06:00) (95% - 100%)    O2 Parameters below as of 20 May 2023 05:49  Patient On (Oxygen Delivery Method): tracheostomy collar  O2 Flow (L/min): 10  O2 Concentration (%): 28                                8.7    9.17  )-----------( 248      ( 20 May 2023 05:21 )             28.5         05-20    151<H>  |  115<H>  |  26<H>  ----------------------------<  177<H>  3.4<L>   |  28  |  1.08    Ca    9.5      20 May 2023 05:21

## 2023-05-20 NOTE — SWALLOW BEDSIDE ASSESSMENT ADULT - SWALLOW EVAL: RECOMMENDED FEEDING/EATING TECHNIQUES
allow for swallow between intakes/check mouth frequently for oral residue/pocketing/hard swallow w/ each bite or sip/maintain upright posture during/after eating for 30 mins

## 2023-05-20 NOTE — SWALLOW BEDSIDE ASSESSMENT ADULT - CONSISTENCIES ADMINISTERED
protein jello was mixed with apple sauce to form a moderately thick material which would be visible if coughed up or suctioned from the trach tube. Disc with NSg to be aware of the color.  Minimal bolus x2 of water given first to lubricate Pt's mouth ./thin liquid/moderately thick

## 2023-05-20 NOTE — SWALLOW BEDSIDE ASSESSMENT ADULT - NS SPL SWALLOW CLINIC TRIAL FT
Disc with pt and family, and with Nsg.  Wait 5 hours.  If there are no s/s aspiration (coughing or suctioning), then Pt may start on PUREE and MODERATELY THICK LIQUIDS.  Service will follow for upgrade.

## 2023-05-20 NOTE — SWALLOW BEDSIDE ASSESSMENT ADULT - SLP GENERAL OBSERVATIONS
Pt seated in awake and somewhat distant: able to follow directions (translated into Mandarin).   Pt able to make a voice and count to three; also able to speak to son in Mandarin: son states speech is imprecise, but is intelligible.

## 2023-05-21 LAB
-  AMIKACIN: SIGNIFICANT CHANGE UP
-  AMOXICILLIN/CLAVULANIC ACID: SIGNIFICANT CHANGE UP
-  AMPICILLIN/SULBACTAM: SIGNIFICANT CHANGE UP
-  AMPICILLIN: SIGNIFICANT CHANGE UP
-  AZTREONAM: SIGNIFICANT CHANGE UP
-  CEFAZOLIN: SIGNIFICANT CHANGE UP
-  CEFEPIME: SIGNIFICANT CHANGE UP
-  CEFOXITIN: SIGNIFICANT CHANGE UP
-  CEFTAZIDIME/AVIBACTAM: SIGNIFICANT CHANGE UP
-  CEFTRIAXONE: SIGNIFICANT CHANGE UP
-  CIPROFLOXACIN: SIGNIFICANT CHANGE UP
-  ERTAPENEM: SIGNIFICANT CHANGE UP
-  GENTAMICIN: SIGNIFICANT CHANGE UP
-  IMIPENEM: SIGNIFICANT CHANGE UP
-  LEVOFLOXACIN: SIGNIFICANT CHANGE UP
-  MEROPENEM: SIGNIFICANT CHANGE UP
-  PIPERACILLIN/TAZOBACTAM: SIGNIFICANT CHANGE UP
-  TOBRAMYCIN: SIGNIFICANT CHANGE UP
-  TRIMETHOPRIM/SULFAMETHOXAZOLE: SIGNIFICANT CHANGE UP
ANION GAP SERPL CALC-SCNC: 7 MMOL/L — SIGNIFICANT CHANGE UP (ref 5–17)
BLANDM BLD POS QL PROBE: SIGNIFICANT CHANGE UP
BUN SERPL-MCNC: 27 MG/DL — HIGH (ref 7–23)
CALCIUM SERPL-MCNC: 9 MG/DL — SIGNIFICANT CHANGE UP (ref 8.5–10.1)
CHLORIDE SERPL-SCNC: 116 MMOL/L — HIGH (ref 96–108)
CO2 SERPL-SCNC: 26 MMOL/L — SIGNIFICANT CHANGE UP (ref 22–31)
CREAT SERPL-MCNC: 0.86 MG/DL — SIGNIFICANT CHANGE UP (ref 0.5–1.3)
CULTURE RESULTS: SIGNIFICANT CHANGE UP
EGFR: 90 ML/MIN/1.73M2 — SIGNIFICANT CHANGE UP
GLUCOSE SERPL-MCNC: 125 MG/DL — HIGH (ref 70–99)
HCT VFR BLD CALC: 27.8 % — LOW (ref 39–50)
HGB BLD-MCNC: 8.5 G/DL — LOW (ref 13–17)
MAGNESIUM SERPL-MCNC: 2 MG/DL — SIGNIFICANT CHANGE UP (ref 1.6–2.6)
MCHC RBC-ENTMCNC: 30.6 GM/DL — LOW (ref 32–36)
MCHC RBC-ENTMCNC: 30.6 PG — SIGNIFICANT CHANGE UP (ref 27–34)
MCV RBC AUTO: 100 FL — SIGNIFICANT CHANGE UP (ref 80–100)
METHOD TYPE: SIGNIFICANT CHANGE UP
METHOD TYPE: SIGNIFICANT CHANGE UP
ORGANISM # SPEC MICROSCOPIC CNT: SIGNIFICANT CHANGE UP
PLATELET # BLD AUTO: 240 K/UL — SIGNIFICANT CHANGE UP (ref 150–400)
POTASSIUM SERPL-MCNC: 3.7 MMOL/L — SIGNIFICANT CHANGE UP (ref 3.5–5.3)
POTASSIUM SERPL-SCNC: 3.7 MMOL/L — SIGNIFICANT CHANGE UP (ref 3.5–5.3)
RBC # BLD: 2.78 M/UL — LOW (ref 4.2–5.8)
RBC # FLD: 16.4 % — HIGH (ref 10.3–14.5)
SODIUM SERPL-SCNC: 149 MMOL/L — HIGH (ref 135–145)
SPECIMEN SOURCE: SIGNIFICANT CHANGE UP
WBC # BLD: 11.5 K/UL — HIGH (ref 3.8–10.5)
WBC # FLD AUTO: 11.5 K/UL — HIGH (ref 3.8–10.5)

## 2023-05-21 PROCEDURE — 93010 ELECTROCARDIOGRAM REPORT: CPT

## 2023-05-21 PROCEDURE — 99233 SBSQ HOSP IP/OBS HIGH 50: CPT

## 2023-05-21 RX ORDER — ALPRAZOLAM 0.25 MG
0.25 TABLET ORAL EVERY 8 HOURS
Refills: 0 | Status: DISCONTINUED | OUTPATIENT
Start: 2023-05-21 | End: 2023-05-28

## 2023-05-21 RX ADMIN — Medication 1 APPLICATION(S): at 10:50

## 2023-05-21 RX ADMIN — Medication 0.25 MILLIGRAM(S): at 21:43

## 2023-05-21 RX ADMIN — Medication 5 MILLIGRAM(S): at 21:43

## 2023-05-21 RX ADMIN — CEFEPIME 1000 MILLIGRAM(S): 1 INJECTION, POWDER, FOR SOLUTION INTRAMUSCULAR; INTRAVENOUS at 05:47

## 2023-05-21 RX ADMIN — CHLORHEXIDINE GLUCONATE 15 MILLILITER(S): 213 SOLUTION TOPICAL at 10:50

## 2023-05-21 RX ADMIN — NYSTATIN CREAM 1 APPLICATION(S): 100000 CREAM TOPICAL at 21:48

## 2023-05-21 RX ADMIN — HYDROMORPHONE HYDROCHLORIDE 1 MILLIGRAM(S): 2 INJECTION INTRAMUSCULAR; INTRAVENOUS; SUBCUTANEOUS at 03:45

## 2023-05-21 RX ADMIN — CLOPIDOGREL BISULFATE 75 MILLIGRAM(S): 75 TABLET, FILM COATED ORAL at 10:47

## 2023-05-21 RX ADMIN — HYDROMORPHONE HYDROCHLORIDE 1 MILLIGRAM(S): 2 INJECTION INTRAMUSCULAR; INTRAVENOUS; SUBCUTANEOUS at 17:27

## 2023-05-21 RX ADMIN — PANTOPRAZOLE SODIUM 40 MILLIGRAM(S): 20 TABLET, DELAYED RELEASE ORAL at 10:46

## 2023-05-21 RX ADMIN — Medication 1 APPLICATION(S): at 14:39

## 2023-05-21 RX ADMIN — HYDROMORPHONE HYDROCHLORIDE 1 MILLIGRAM(S): 2 INJECTION INTRAMUSCULAR; INTRAVENOUS; SUBCUTANEOUS at 03:33

## 2023-05-21 RX ADMIN — HEPARIN SODIUM 5000 UNIT(S): 5000 INJECTION INTRAVENOUS; SUBCUTANEOUS at 10:46

## 2023-05-21 RX ADMIN — Medication 100 MILLIGRAM(S): at 10:47

## 2023-05-21 RX ADMIN — HEPARIN SODIUM 5000 UNIT(S): 5000 INJECTION INTRAVENOUS; SUBCUTANEOUS at 21:43

## 2023-05-21 RX ADMIN — OXYCODONE HYDROCHLORIDE 2.5 MILLIGRAM(S): 5 TABLET ORAL at 10:47

## 2023-05-21 RX ADMIN — AMIODARONE HYDROCHLORIDE 200 MILLIGRAM(S): 400 TABLET ORAL at 10:47

## 2023-05-21 RX ADMIN — QUETIAPINE FUMARATE 50 MILLIGRAM(S): 200 TABLET, FILM COATED ORAL at 21:43

## 2023-05-21 RX ADMIN — CHLORHEXIDINE GLUCONATE 15 MILLILITER(S): 213 SOLUTION TOPICAL at 21:43

## 2023-05-21 RX ADMIN — NYSTATIN CREAM 1 APPLICATION(S): 100000 CREAM TOPICAL at 10:50

## 2023-05-21 RX ADMIN — CEFEPIME 1000 MILLIGRAM(S): 1 INJECTION, POWDER, FOR SOLUTION INTRAMUSCULAR; INTRAVENOUS at 17:26

## 2023-05-21 RX ADMIN — Medication 500 MILLIGRAM(S): at 10:47

## 2023-05-21 RX ADMIN — Medication 2000 UNIT(S): at 10:46

## 2023-05-21 RX ADMIN — CHLORHEXIDINE GLUCONATE 1 APPLICATION(S): 213 SOLUTION TOPICAL at 10:46

## 2023-05-21 NOTE — PROGRESS NOTE ADULT - ASSESSMENT
76 yo male with hx of DM, HTN, HLD, s/p STEMI complicated by cardiogenic shock, BARBRA requiring dialysis, acute respiratory failure requiring trach/peg.  Transported from [place] to [place] at family request.  Found to have scrotal wounds/ pressure ulcers.  Growing pseudomonas and klebsiella in sputum,  nothing in blood.     1. s/p MI with cardiogentic shock, last echo ef 65%,   2. renal failure  3. GI tolerating tube feeds/s/p PEG  4. scrotal infection  5. chronic respiratory failure     Plan  - keep on trach collar as been for 72 hours leave on trach collar as tolerated  - ID cefepime for pseudomans in sputum, and for scrotal wound  - kidney function improving near baseline, permacath out, hypernatremia , increased free water  - CT pelvis no air , urology note appreciqted   -  DAPT, statin, BB

## 2023-05-21 NOTE — PROGRESS NOTE ADULT - SUBJECTIVE AND OBJECTIVE BOX
Date of Service: 5/21/23  HPI: 78 yo male with hx of DM, HTN, HLD, who presented initially to an outside hospital in [place], [place] on 3/1/23 with chest pain and lightheadedness after doing yard work, found to have an inferiolateral ST elevations.  He was taken to the cath lab and underwent PCI with MEDINA placement to mLCX (Resolute [place] 4 x 26mm), dLAD ([facility]olute [place] 2.5 x 15mm and 2.25 x 12mm), and Impella CP was placed.   Patient was cannulated for peripheral VA ECMO on 3/2 and decannulated on 3/9.  Impella was removed 3/10.  Hospital course was complicated by multiple watershed strokes, acute respiratory failure requiring tracheostomy, BARBRA requiring renal replacement therapy, upper GI bleed, massive hemoptysis originating from lingula requiring bronchial blocker and bronchial artery embolization, pseudomonas bacteremia and septic shock.    Patient was eventually transported from [facility] on 5/16 to [facility] CCU with a tracheostomy, PEG, tunneled right sided HD catheter, right sided PICC line, rectal tube, and ostomy bag over his penis for urine collection.     (16 May 2023 16:49)    Podiatry consulted for R foot evaluation. Pt at bedside, non verbal, family present. Per family, they report wounds being present for approx 3-4wks. Family admits to noticing yellow drainage from R big toe. Pt no aware pt missing R big toe nail. No additional pedal complaints reported at this time.     5/21/23: Pt seen by Podiatry team with attending present for follow up of R foot wounds. Pt awake and alert, family at bedside.     PMH: STEMI (ST elevation myocardial infarction)    Cardiogenic shock    Stroke    History of chronic respiratory failure      PSH:    Allergies:No Known Allergies      Labs:                        8.5    11.50 )-----------( 240      ( 21 May 2023 05:49 )             27.8     05-21    149<H>  |  116<H>  |  27<H>  ----------------------------<  125<H>  3.7   |  26  |  0.86    Ca    9.0      21 May 2023 05:49  Mg     2.0     05-21        MEDICATIONS  (STANDING):  aMIOdarone    Tablet 200 milliGRAM(s) Oral daily  ascorbic acid 500 milliGRAM(s) Oral daily  cadexomer iodine 0.9% Gel 1 Application(s) Topical daily  cefepime  Injectable.      cefepime  Injectable. 1000 milliGRAM(s) IV Push every 12 hours  chlorhexidine 0.12% Liquid 15 milliLiter(s) Oral Mucosa every 12 hours  chlorhexidine 4% Liquid 1 Application(s) Topical <User Schedule>  cholecalciferol 2000 Unit(s) Oral daily  clopidogrel Tablet 75 milliGRAM(s) Enteral Tube daily  collagenase Ointment 1 Application(s) Topical daily  heparin   Injectable 5000 Unit(s) SubCutaneous every 12 hours  melatonin 5 milliGRAM(s) Oral at bedtime  midodrine 10 milliGRAM(s) Oral every 8 hours  nystatin Powder 1 Application(s) Topical two times a day  pantoprazole  Injectable 40 milliGRAM(s) IV Push daily  QUEtiapine 50 milliGRAM(s) Oral at bedtime  thiamine 100 milliGRAM(s) Oral daily    MEDICATIONS  (PRN):  ALPRAZolam 0.25 milliGRAM(s) Oral every 8 hours PRN agitation  HYDROmorphone  Injectable 1 milliGRAM(s) IV Push every 4 hours PRN Severe Pain (7 - 10)  oxyCODONE    IR 2.5 milliGRAM(s) Oral every 6 hours PRN moderate to severe pain      Vital Signs Last 24 Hrs  T(C): 36.4 (21 May 2023 04:00), Max: 37 (21 May 2023 00:10)  T(F): 97.5 (21 May 2023 04:00), Max: 98.6 (21 May 2023 00:10)  HR: 116 (21 May 2023 10:00) (108 - 123)  BP: 140/72 (21 May 2023 10:00) (110/56 - 148/78)  BP(mean): 89 (21 May 2023 10:00) (68 - 95)  RR: 30 (21 May 2023 10:00) (13 - 33)  SpO2: 100% (21 May 2023 10:00) (100% - 100%)    Parameters below as of 21 May 2023 05:54  Patient On (Oxygen Delivery Method): tracheostomy collar  O2 Flow (L/min): 10  O2 Concentration (%): 28      Physical Exam:   Constitutional: NAD, alert;  Derm:  Skin warm, dry and supple bilateral.    Wound to nail bed of dorsal Left hallux with absent nail, superficial in nature, no hyperkeratotic border, wound base fibrogranular, wound size (approx 2.8 cm X 2cm), no edema, no purulence, no fluctuance, no tracking/tunneling, no probe to bone, mild sanguinous discharge distal to eponychium, eschar noted to medial distal hallux and at eponychium.   Pressure stage 2 wound to Right heel, no PTB, no crepitus, no fluctuance, superficial in nature with pink wound bed.   Vascular: Dorsalis Pedis and Posterior Tibial pulses non palpable.  Capillary refill delayed bilateral.    Neuro: Unable to access due to pt condition  MSK: Unable to access due to pt condition        < from: Xray Foot AP + Lateral + Oblique, Right (05.17.23 @ 15:36) >  INTERPRETATION:  RIGHT foot    CLINICAL INFORMATION: Hallux and heel wound TECHNIQUE: AP,lateral and   oblique views.    FINDINGS: Heel soft tissue subcutaneous air adjacent to intact calcaneal   tuberosity.  RIGHT hallux are radiographically intact.  Second and third hammertoe deformities.  Remaining osseous and joint structures of the RIGHT foot are   radiographicallyintact.  The bones and joint spaces are radiographically intact.  No fracture or dislocation.  Soft tissues unremarkable.    IMPRESSION:    Heel subcutaneous air without radiographic evidence of calcaneal   tuberosity osteolysis/osteomyelitis.  Firsthallux radiographically intact..  If osteomyelitis is clinically considered  despite conservative therapy,   and soft tissue / bone infection requires further assessment, follow-up   MRI recommended.    --- End of Report ---    < end of copied text >

## 2023-05-21 NOTE — PROGRESS NOTE ADULT - SUBJECTIVE AND OBJECTIVE BOX
Events Overnight:  Patient on trach Collar no fevers,     HPI:  78 y/o male with PMH HTN, HLD, DM2, recent prolonged hospital course in [place], [place] after a IWMI (underwent PCI with MEDINA to LCx) complicated by cardiogenic shock requiring ECMO, acute hypoxic resp failure requiring trach, dysphagia requiring PEG, BARBRA requiring dialysis, pA.fib, acute CVA, septic shock from Pseudomonas bacteremia, hemoptysis, GI bleed   Now transferred to  for further care.  off pressors, bp ok,   On trach collar.    Dialysis catheter removed was removed  on cefepime for pseudomonas in sputum and scrotum    PMH:       as above     ROS cant obtain due to trach    MEDICATIONS  (STANDING):  aMIOdarone    Tablet 200 milliGRAM(s) Oral daily  ascorbic acid 500 milliGRAM(s) Oral daily  cadexomer iodine 0.9% Gel 1 Application(s) Topical daily  cefepime  Injectable. 1000 milliGRAM(s) IV Push every 12 hours  cefepime  Injectable.      chlorhexidine 0.12% Liquid 15 milliLiter(s) Oral Mucosa every 12 hours  chlorhexidine 4% Liquid 1 Application(s) Topical <User Schedule>  cholecalciferol 2000 Unit(s) Oral daily  clopidogrel Tablet 75 milliGRAM(s) Enteral Tube daily  collagenase Ointment 1 Application(s) Topical daily  heparin   Injectable 5000 Unit(s) SubCutaneous every 12 hours  melatonin 5 milliGRAM(s) Oral at bedtime  midodrine 10 milliGRAM(s) Oral every 8 hours  nystatin Powder 1 Application(s) Topical two times a day  pantoprazole  Injectable 40 milliGRAM(s) IV Push daily  QUEtiapine 50 milliGRAM(s) Oral at bedtime  thiamine 100 milliGRAM(s) Oral daily    MEDICATIONS  (PRN):  HYDROmorphone  Injectable 1 milliGRAM(s) IV Push every 4 hours PRN Severe Pain (7 - 10)  oxyCODONE    IR 2.5 milliGRAM(s) Oral every 6 hours PRN moderate to severe pain      ICU Vital Signs Last 24 Hrs  T(C): 36.4 (21 May 2023 04:00), Max: 37 (21 May 2023 00:10)  T(F): 97.5 (21 May 2023 04:00), Max: 98.6 (21 May 2023 00:10)  HR: 116 (21 May 2023 10:00) (106 - 123)  BP: 140/72 (21 May 2023 10:00) (110/56 - 148/78)  BP(mean): 89 (21 May 2023 10:00) (68 - 95)  ABP: --  ABP(mean): --  RR: 30 (21 May 2023 10:00) (13 - 33)  SpO2: 100% (21 May 2023 10:00) (100% - 100%)    O2 Parameters below as of 21 May 2023 05:54  Patient On (Oxygen Delivery Method): tracheostomy collar  O2 Flow (L/min): 10  O2 Concentration (%): 28    Physical Exam    General agitated at times  HEENT nc/at  neck s/p trach  chest right permacath site clean  lungs scattered rhonchi  cv rrr sinus tachycardia  abdomen s/p peg  extrmeiteis contracted, scaley skin  neuro non focal, alert   condom cath in place, excoriations to scrotum    I&O's Summary    20 May 2023 07:01  -  21 May 2023 07:00  --------------------------------------------------------  IN: 2000 mL / OUT: 850 mL / NET: 1150 mL                        8.5    11.50 )-----------( 240      ( 21 May 2023 05:49 )             27.8       05-21    149<H>  |  116<H>  |  27<H>  ----------------------------<  125<H>  3.7   |  26  |  0.86    Ca    9.0      21 May 2023 05:49  Mg     2.0     05-21    DVT Prophylaxis:  Heparin subq                                                               Advanced Directives: Full Code

## 2023-05-21 NOTE — PROGRESS NOTE ADULT - ASSESSMENT
Assesment: 76yo Male seen for the following:  -Partial thickness wound to R hallux  -Partial thickness wound to R heel  -Diabetes Milletus type 2  -Difficulty with ambulation    Plan:   -Chart reviewed and Patient evaluated;  -Discussed diagnosis and treatment with patient.  -Xrays of R foot reviewed: on wet read showing no acute cortical demineralization, noted subtle radiolucency by subcutaneous tissue of posterior heel.   -Wound flush with normal saline. Official impression noted above.   -Applied betadine to Right hallux and xeroform to R heel with DSD  -Wound to Right hallux is superficial in nature with scant sanguinous discharge on evaluation and surrounding eschar tissue, no pus/purulence, no PTB, stable at this time.   Wound to Right heel is superficial in nature with pink wound bed, no fluctuance, no crepitus, no PTB, stable at this time.   -WC: idosorb and DSD  -Offloading of bilateral heels with CAIR boots or while bedbound to reduce further tissue damage   -5/18- on xray noted subtle air by subcutaneous tissue at posterior heel. Re-assessed heel wound at bedside today evening. No fluctuance, no crepitus, no PTB. The subcutaneous air noted on xray is air from wound.   -Will continue to monitor and continue local wound care. Will repeat R foot xrays over the weekend.   -Continue with IV antibiotics As Per Med  -All additional care per Med appreciated  -Patient tolerated interventions well without any complications.   -Podiatry will follow while in house.

## 2023-05-22 LAB
ANION GAP SERPL CALC-SCNC: 9 MMOL/L — SIGNIFICANT CHANGE UP (ref 5–17)
BUN SERPL-MCNC: 34 MG/DL — HIGH (ref 7–23)
CALCIUM SERPL-MCNC: 8.9 MG/DL — SIGNIFICANT CHANGE UP (ref 8.5–10.1)
CHLORIDE SERPL-SCNC: 111 MMOL/L — HIGH (ref 96–108)
CO2 SERPL-SCNC: 25 MMOL/L — SIGNIFICANT CHANGE UP (ref 22–31)
CREAT SERPL-MCNC: 0.93 MG/DL — SIGNIFICANT CHANGE UP (ref 0.5–1.3)
CULTURE RESULTS: SIGNIFICANT CHANGE UP
CULTURE RESULTS: SIGNIFICANT CHANGE UP
EGFR: 86 ML/MIN/1.73M2 — SIGNIFICANT CHANGE UP
GLUCOSE SERPL-MCNC: 179 MG/DL — HIGH (ref 70–99)
HCT VFR BLD CALC: 27.7 % — LOW (ref 39–50)
HGB BLD-MCNC: 8.4 G/DL — LOW (ref 13–17)
MCHC RBC-ENTMCNC: 30.3 GM/DL — LOW (ref 32–36)
MCHC RBC-ENTMCNC: 30.8 PG — SIGNIFICANT CHANGE UP (ref 27–34)
MCV RBC AUTO: 101.5 FL — HIGH (ref 80–100)
PLATELET # BLD AUTO: 220 K/UL — SIGNIFICANT CHANGE UP (ref 150–400)
POTASSIUM SERPL-MCNC: 3.1 MMOL/L — LOW (ref 3.5–5.3)
POTASSIUM SERPL-SCNC: 3.1 MMOL/L — LOW (ref 3.5–5.3)
RBC # BLD: 2.73 M/UL — LOW (ref 4.2–5.8)
RBC # FLD: 16.6 % — HIGH (ref 10.3–14.5)
SODIUM SERPL-SCNC: 145 MMOL/L — SIGNIFICANT CHANGE UP (ref 135–145)
SPECIMEN SOURCE: SIGNIFICANT CHANGE UP
SPECIMEN SOURCE: SIGNIFICANT CHANGE UP
WBC # BLD: 9.02 K/UL — SIGNIFICANT CHANGE UP (ref 3.8–10.5)
WBC # FLD AUTO: 9.02 K/UL — SIGNIFICANT CHANGE UP (ref 3.8–10.5)

## 2023-05-22 PROCEDURE — 99233 SBSQ HOSP IP/OBS HIGH 50: CPT | Mod: 25

## 2023-05-22 PROCEDURE — 31502 CHANGE OF WINDPIPE AIRWAY: CPT

## 2023-05-22 RX ORDER — POTASSIUM CHLORIDE 20 MEQ
40 PACKET (EA) ORAL EVERY 4 HOURS
Refills: 0 | Status: COMPLETED | OUTPATIENT
Start: 2023-05-22 | End: 2023-05-22

## 2023-05-22 RX ORDER — CEFEPIME 1 G/1
2000 INJECTION, POWDER, FOR SOLUTION INTRAMUSCULAR; INTRAVENOUS EVERY 12 HOURS
Refills: 0 | Status: DISCONTINUED | OUTPATIENT
Start: 2023-05-22 | End: 2023-05-23

## 2023-05-22 RX ORDER — CEFEPIME 1 G/1
2000 INJECTION, POWDER, FOR SOLUTION INTRAMUSCULAR; INTRAVENOUS EVERY 12 HOURS
Refills: 0 | Status: DISCONTINUED | OUTPATIENT
Start: 2023-05-22 | End: 2023-05-22

## 2023-05-22 RX ADMIN — QUETIAPINE FUMARATE 50 MILLIGRAM(S): 200 TABLET, FILM COATED ORAL at 22:59

## 2023-05-22 RX ADMIN — Medication 0.25 MILLIGRAM(S): at 09:30

## 2023-05-22 RX ADMIN — Medication 500 MILLIGRAM(S): at 09:31

## 2023-05-22 RX ADMIN — HYDROMORPHONE HYDROCHLORIDE 1 MILLIGRAM(S): 2 INJECTION INTRAMUSCULAR; INTRAVENOUS; SUBCUTANEOUS at 09:26

## 2023-05-22 RX ADMIN — Medication 1 APPLICATION(S): at 09:32

## 2023-05-22 RX ADMIN — OXYCODONE HYDROCHLORIDE 2.5 MILLIGRAM(S): 5 TABLET ORAL at 03:05

## 2023-05-22 RX ADMIN — NYSTATIN CREAM 1 APPLICATION(S): 100000 CREAM TOPICAL at 09:31

## 2023-05-22 RX ADMIN — Medication 40 MILLIEQUIVALENT(S): at 13:58

## 2023-05-22 RX ADMIN — MIDODRINE HYDROCHLORIDE 10 MILLIGRAM(S): 2.5 TABLET ORAL at 23:01

## 2023-05-22 RX ADMIN — NYSTATIN CREAM 1 APPLICATION(S): 100000 CREAM TOPICAL at 23:00

## 2023-05-22 RX ADMIN — CHLORHEXIDINE GLUCONATE 1 APPLICATION(S): 213 SOLUTION TOPICAL at 09:31

## 2023-05-22 RX ADMIN — OXYCODONE HYDROCHLORIDE 2.5 MILLIGRAM(S): 5 TABLET ORAL at 09:36

## 2023-05-22 RX ADMIN — Medication 1 APPLICATION(S): at 11:45

## 2023-05-22 RX ADMIN — HYDROMORPHONE HYDROCHLORIDE 1 MILLIGRAM(S): 2 INJECTION INTRAMUSCULAR; INTRAVENOUS; SUBCUTANEOUS at 06:00

## 2023-05-22 RX ADMIN — CLOPIDOGREL BISULFATE 75 MILLIGRAM(S): 75 TABLET, FILM COATED ORAL at 09:30

## 2023-05-22 RX ADMIN — Medication 5 MILLIGRAM(S): at 22:59

## 2023-05-22 RX ADMIN — CHLORHEXIDINE GLUCONATE 15 MILLILITER(S): 213 SOLUTION TOPICAL at 09:33

## 2023-05-22 RX ADMIN — Medication 40 MILLIEQUIVALENT(S): at 09:30

## 2023-05-22 RX ADMIN — HEPARIN SODIUM 5000 UNIT(S): 5000 INJECTION INTRAVENOUS; SUBCUTANEOUS at 09:29

## 2023-05-22 RX ADMIN — Medication 100 MILLIGRAM(S): at 09:30

## 2023-05-22 RX ADMIN — HYDROMORPHONE HYDROCHLORIDE 1 MILLIGRAM(S): 2 INJECTION INTRAMUSCULAR; INTRAVENOUS; SUBCUTANEOUS at 05:46

## 2023-05-22 RX ADMIN — OXYCODONE HYDROCHLORIDE 2.5 MILLIGRAM(S): 5 TABLET ORAL at 04:05

## 2023-05-22 RX ADMIN — MIDODRINE HYDROCHLORIDE 10 MILLIGRAM(S): 2.5 TABLET ORAL at 13:58

## 2023-05-22 RX ADMIN — Medication 0.25 MILLIGRAM(S): at 22:58

## 2023-05-22 RX ADMIN — OXYCODONE HYDROCHLORIDE 2.5 MILLIGRAM(S): 5 TABLET ORAL at 22:59

## 2023-05-22 RX ADMIN — HEPARIN SODIUM 5000 UNIT(S): 5000 INJECTION INTRAVENOUS; SUBCUTANEOUS at 23:01

## 2023-05-22 RX ADMIN — PANTOPRAZOLE SODIUM 40 MILLIGRAM(S): 20 TABLET, DELAYED RELEASE ORAL at 09:29

## 2023-05-22 RX ADMIN — CEFEPIME 100 MILLIGRAM(S): 1 INJECTION, POWDER, FOR SOLUTION INTRAMUSCULAR; INTRAVENOUS at 23:00

## 2023-05-22 RX ADMIN — AMIODARONE HYDROCHLORIDE 200 MILLIGRAM(S): 400 TABLET ORAL at 09:30

## 2023-05-22 RX ADMIN — Medication 2000 UNIT(S): at 09:30

## 2023-05-22 RX ADMIN — CEFEPIME 1000 MILLIGRAM(S): 1 INJECTION, POWDER, FOR SOLUTION INTRAMUSCULAR; INTRAVENOUS at 05:45

## 2023-05-22 NOTE — PROGRESS NOTE ADULT - ASSESSMENT
76 yo male with hx of DM, HTN, HLD, who presented initially to an outside hospital in [place], [place] on 3/1/23 with chest pain and lightheadedness after doing yard work, found to have an inferiolateral ST elevations.  He was taken to the cath lab and underwent PCI with MEDINA placement to mLCX ([facility]olute [place] 4 x 26mm), dLAD ([facility] [place] 2.5 x 15mm and 2.25 x 12mm), and Impella CP was placed.  Patient was cannulated for peripheral VA ECMO on 3/2 and decannulated on 3/9.  Impella was removed 3/10.  Hospital course was complicated by multiple watershed strokes, acute respiratory failure requiring tracheostomy, BARBRA requiring renal replacement therapy, upper GI bleed, massive hemoptysis originating from lingula requiring bronchial blocker and bronchial artery embolization, pseudomonas bacteremia and septic shock.  Patient was eventually transported from [facility] on 5/16 to [facility] CCU with a tracheostomy, PEG, tunneled right sided HD catheter, right sided PICC line, rectal tube, and ostomy bag over his penis for urine collection.       1. Complicated UTI with Enterobacter/Ecoli. Scrotal infection/cellulitis. R heel/R hallux wounds. Chronic resp failure s/p tracheostomy   - imaging and chart reviewed, slowly improving  - urine cx growing Enterobacter/Ecoli f/u sensitivities  - s/p rocephin 5/16-5/18   - on IV cefepime [id number] #4 increase dose to [id number]   - xray noted, prob congestion over pna, sputum cx growing psae could be colonization - cefepime will cover  - urology eval noted  - CT abd/pelvis reviewed 5/19  - continue with antibiotic coverage  - podiatry eval noted  - blood cx no growth   - tolerating abx well so far; no side effects noted  - reason for abx use and side effects reviewed with patient  - supportive care    2. other issues - care per medicine  78 yo male with hx of DM, HTN, HLD, who presented initially to an outside hospital in [place], [place] on 3/1/23 with chest pain and lightheadedness after doing yard work, found to have an inferiolateral ST elevations.  He was taken to the cath lab and underwent PCI with MEDNIA placement to mLCX (Resolute [place] 4 x 26mm), dLAD ([facility]olute [place] 2.5 x 15mm and 2.25 x 12mm), and Impella CP was placed.  Patient was cannulated for peripheral VA ECMO on 3/2 and decannulated on 3/9.  Impella was removed 3/10.  Hospital course was complicated by multiple watershed strokes, acute respiratory failure requiring tracheostomy, BARBRA requiring renal replacement therapy, upper GI bleed, massive hemoptysis originating from lingula requiring bronchial blocker and bronchial artery embolization, pseudomonas bacteremia and septic shock.  Patient was eventually transported from [facility] on 5/16 to [facility]U with a tracheostomy, PEG, tunneled right sided HD catheter, right sided PICC line, rectal tube, and ostomy bag over his penis for urine collection.       1. Complicated UTI with Enterobacter/Ecoli. Scrotal infection/cellulitis. R heel/R hallux wounds. Chronic resp failure s/p tracheostomy   - imaging and chart reviewed, slowly improving  - urine cx growing Enterobacter/Ecoli sensitivities reviewed   - s/p rocephin 5/16-5/18   - on IV cefepime [id number] #4 increase dose to [id number]   - xray noted, prob congestion over pna, sputum cx growing psae/enterobacter in sputum could be colonization - cefepime will cover  - urology eval noted  - CT abd/pelvis reviewed 5/19  - continue with antibiotic coverage, 7 day course  - podiatry eval noted  - blood cx no growth   - tolerating abx well so far; no side effects noted  - reason for abx use and side effects reviewed with patient  - supportive care    2. other issues - care per medicine

## 2023-05-22 NOTE — PROGRESS NOTE ADULT - SUBJECTIVE AND OBJECTIVE BOX
Date of service: 05-22-23 @ 13:26    pt seen and examined  on trach collar  no resp distress  afebrile    ROS: unable to obtain d/t medical condition    MEDICATIONS  (STANDING):  aMIOdarone    Tablet 200 milliGRAM(s) Oral daily  ascorbic acid 500 milliGRAM(s) Oral daily  cadexomer iodine 0.9% Gel 1 Application(s) Topical daily  cefepime  Injectable. 1000 milliGRAM(s) IV Push every 12 hours  cefepime  Injectable.      chlorhexidine 0.12% Liquid 15 milliLiter(s) Oral Mucosa every 12 hours  chlorhexidine 4% Liquid 1 Application(s) Topical <User Schedule>  cholecalciferol 2000 Unit(s) Oral daily  clopidogrel Tablet 75 milliGRAM(s) Enteral Tube daily  collagenase Ointment 1 Application(s) Topical daily  heparin   Injectable 5000 Unit(s) SubCutaneous every 12 hours  melatonin 5 milliGRAM(s) Oral at bedtime  midodrine 10 milliGRAM(s) Oral every 8 hours  nystatin Powder 1 Application(s) Topical two times a day  pantoprazole  Injectable 40 milliGRAM(s) IV Push daily  potassium chloride   Powder 40 milliEquivalent(s) Oral every 4 hours  QUEtiapine 50 milliGRAM(s) Oral at bedtime  thiamine 100 milliGRAM(s) Oral daily    Vital Signs Last 24 Hrs  T(C): 36.3 (22 May 2023 10:56), Max: 37 (22 May 2023 00:00)  T(F): 97.3 (22 May 2023 10:56), Max: 98.6 (22 May 2023 00:00)  HR: 112 (22 May 2023 12:00) (96 - 121)  BP: 114/65 (22 May 2023 12:00) (101/45 - 143/74)  BP(mean): 74 (22 May 2023 12:00) (58 - 92)  RR: 24 (22 May 2023 12:00) (18 - 38)  SpO2: 97% (22 May 2023 12:00) (94% - 100%)    Parameters below as of 21 May 2023 21:16  Patient On (Oxygen Delivery Method): tracheostomy collar  O2 Flow (L/min): 10  O2 Concentration (%): 28      PE:  Constitutional: frail looking  HEENT: NC/AT, EOMI, PERRLA, conjunctivae clear; ears and nose atraumatic; pharynx benign + trach collar  Neck: supple; thyroid not palpable  Back: no tenderness  Respiratory: decreased breath sounds   Cardiovascular: S1S2 regular, no murmurs  Abdomen: soft, not tender, not distended, positive BS; liver and spleen WNL  Genitourinary: no suprapubic tenderness Scrotum skin edema and erythema with superficial skin excoriation, tender to touch, no crepitus condom catheter in place   Lymphatic: no LN palpable  Musculoskeletal: no muscle tenderness, no joint swelling or tenderness  Extremities: no pedal edema R heel/R hallux wound  Neurological/ Psychiatric: moving all extremities  Skin: no rashes; no palpable lesions    Labs: all available labs reviewed                                   8.4    9.02  )-----------( 220      ( 22 May 2023 05:47 )             27.7     05-22    145  |  111<H>  |  34<H>  ----------------------------<  179<H>  3.1<L>   |  25  |  0.93    Ca    8.9      22 May 2023 05:47  Mg     2.0     05-21      Culture - Sputum (05.16.23 @ 23:14)   Gram Stain:   Rare polymorphonuclear leukocytes per low power field   Few Squamous epithelial cells per low power field   Numerous Gram Negative Rods per oil power field  - Amikacin: S <=16  - Aztreonam: I 16  - Cefepime: S 8  - Ceftazidime: I 16  - Ciprofloxacin: S <=0.25  - Gentamicin: S 4  - Imipenem: S <=1  - Levofloxacin: S 1  - Meropenem: S <=1  - Piperacillin/Tazobactam: I 64  - Tobramycin: S <=2  Specimen Source: .Sputum Sputum  Culture Results:   Numerous Pseudomonas aeruginosa   Normal Respiratory Cristiane absent  Organism Identification: Pseudomonas aeruginosa  Organism: Pseudomonas aeruginosa  Method Type: MICCulture - Urine (05.16.23 @ 18:00)   Specimen Source: Clean Catch Clean Catch (Midstream)  Culture Results:   >100,000 CFU/ml Escherichia coli   >100,000 CFU/ml Klebsiella aerogenes (Previously Enterobacter)  Culture - Blood (05.16.23 @ 16:54)   Specimen Source: .Blood None  Culture Results:   No growth to date.    Radiology: all available radiological tests reviewed  < from: Xray Chest 1 View- PORTABLE-Routine (05.16.23 @ 20:23) >    ACC: [id number] EXAM:  XR CHEST PORTABLE ROUTINE 1V   ORDERED BY: RADHA AVALOS     PROCEDURE DATE:  05/16/2023          INTERPRETATION:  TIME OF EXAM: May 16, 2023 at 8:10 PM.    CLINICAL INFORMATION: Chronic respiratory failure. Tracheostomy.    COMPARISON:  None available    TECHNIQUE:   AP Portable chest x-ray.    INTERPRETATION:    The heart is not enlarged. The thoracic aorta is calcified.  Tracheostomy tube is in place.  Right IJ catheter with tip in the right atrium.  Right upper extremity PICC line. Tip obscured by right IJ catheter. The   line extends at least to the SVC.  There are bilateral perihilar opacities, more extensive on the right.  There are bilateral small pleural effusions with likely associated   passive atelectasis.  No pneumothorax is seen.  No acute bony abnormality is noted.      IMPRESSION:  Lines as above.    Bilateral perihilar opacities, more extensive on the right, that could be   due to pulmonary edema or multifocal pneumonia.    Bilateral small pleural effusions with likely associated passive   atelectasis.    < end of copied text >  < from: Xray Foot AP + Lateral + Oblique, Right (05.17.23 @ 15:36) >    ACC: [id number] EXAM:  XR FOOT COMP MIN 3 VIEWS RT   ORDERED BY: AMAN BAEZA     PROCEDURE DATE:  05/17/2023          INTERPRETATION:  RIGHT foot    CLINICAL INFORMATION: Hallux and heel wound TECHNIQUE: AP,lateral and   oblique views.    FINDINGS: Heel soft tissue subcutaneous air adjacent to intact calcaneal   tuberosity.  RIGHT hallux are radiographically intact.  Second and third hammertoe deformities.  Remaining osseous and joint structures of the RIGHT foot are   radiographicallyintact.  The bones and joint spaces are radiographically intact.  No fracture or dislocation.  Soft tissues unremarkable.    IMPRESSION:    Heel subcutaneous air without radiographic evidence of calcaneal   tuberosity osteolysis/osteomyelitis.  Firsthallux radiographically intact..  If osteomyelitis is clinically considered  despite conservative therapy,   and soft tissue / bone infection requires further assessment, follow-up   MRI recommended.    --- End of Report ---      < end of copied text >    Advanced directives addressed: full resuscitation

## 2023-05-22 NOTE — PROGRESS NOTE ADULT - ASSESSMENT
76 yo male with hx of DM, HTN, HLD, s/p STEMI complicated by cardiogenic shock, BARBRA requiring dialysis, acute respiratory failure requiring trach/peg.  Transported from [place] to [place] at family request.  Found to have scrotal wounds/ pressure ulcers.  Growing pseudomonas and klebsiella in sputum,  nothing in blood.     1. s/p MI with cardiogentic shock, last echo ef 65%,   2. renal failure  3. GI tolerating tube feeds/s/p PEG  4. scrotal infection  5. chronic respiratory failure     Plan  - trach collar as tolerated.  Will Change to fenestrated trach  - ID cefepime for pseudomans in sputum, and for scrotal wound  - kidney function improving near baseline, permacath out, hypernatremia, replave K+, free water  - CT pelvis no air , urology note appreciated.  No intervention   -  DAPT, statin, BB

## 2023-05-22 NOTE — PROGRESS NOTE ADULT - SUBJECTIVE AND OBJECTIVE BOX
HPI:  78 y/o male with PMH HTN, HLD, DM2, recent prolonged hospital course in [place], [place] after a IWMI (underwent PCI with MEDINA to LCx) complicated by cardiogenic shock requiring ECMO, acute hypoxic resp failure requiring trach, dysphagia requiring PEG, BARBRA requiring dialysis, pA.fib, acute CVA, septic shock from Pseudomonas bacteremia, hemoptysis, GI bleed   Now transferred to  for further care.  off pressors, bp ok,   On trach collar.    Dialysis catheter removed was removed  on cefepime for pseudomonas in sputum and scrotum      5/22: No events over night.  Tolerating TC well       PAST MEDICAL & SURGICAL HISTORY:  STEMI (ST elevation myocardial infarction)      Cardiogenic shock      Stroke      History of chronic respiratory failure          FAMILY HISTORY:      Social Hx:    Allergies    No Known Allergies    Intolerances            ICU Vital Signs Last 24 Hrs  T(C): 36.3 (22 May 2023 10:56), Max: 37 (22 May 2023 00:00)  T(F): 97.3 (22 May 2023 10:56), Max: 98.6 (22 May 2023 00:00)  HR: 112 (22 May 2023 12:00) (96 - 121)  BP: 114/65 (22 May 2023 12:00) (101/45 - 143/74)  BP(mean): 74 (22 May 2023 12:00) (58 - 92)  ABP: --  ABP(mean): --  RR: 24 (22 May 2023 12:00) (18 - 38)  SpO2: 97% (22 May 2023 12:00) (94% - 100%)    O2 Parameters below as of 21 May 2023 21:16  Patient On (Oxygen Delivery Method): tracheostomy collar  O2 Flow (L/min): 10  O2 Concentration (%): 28        Mode: standby      I&O's Summary    21 May 2023 07:01  -  22 May 2023 07:00  --------------------------------------------------------  IN: 800 mL / OUT: 600 mL / NET: 200 mL                              8.4    9.02  )-----------( 220      ( 22 May 2023 05:47 )             27.7       05-22    145  |  111<H>  |  34<H>  ----------------------------<  179<H>  3.1<L>   |  25  |  0.93    Ca    8.9      22 May 2023 05:47  Mg     2.0     05-21                      MEDICATIONS  (STANDING):  aMIOdarone    Tablet 200 milliGRAM(s) Oral daily  ascorbic acid 500 milliGRAM(s) Oral daily  cadexomer iodine 0.9% Gel 1 Application(s) Topical daily  cefepime  Injectable.      cefepime  Injectable. 1000 milliGRAM(s) IV Push every 12 hours  chlorhexidine 0.12% Liquid 15 milliLiter(s) Oral Mucosa every 12 hours  chlorhexidine 4% Liquid 1 Application(s) Topical <User Schedule>  cholecalciferol 2000 Unit(s) Oral daily  clopidogrel Tablet 75 milliGRAM(s) Enteral Tube daily  collagenase Ointment 1 Application(s) Topical daily  heparin   Injectable 5000 Unit(s) SubCutaneous every 12 hours  melatonin 5 milliGRAM(s) Oral at bedtime  midodrine 10 milliGRAM(s) Oral every 8 hours  nystatin Powder 1 Application(s) Topical two times a day  pantoprazole  Injectable 40 milliGRAM(s) IV Push daily  potassium chloride   Powder 40 milliEquivalent(s) Oral every 4 hours  QUEtiapine 50 milliGRAM(s) Oral at bedtime  thiamine 100 milliGRAM(s) Oral daily    MEDICATIONS  (PRN):  ALPRAZolam 0.25 milliGRAM(s) Oral every 8 hours PRN agitation  HYDROmorphone  Injectable 1 milliGRAM(s) IV Push every 4 hours PRN Severe Pain (7 - 10)  oxyCODONE    IR 2.5 milliGRAM(s) Oral every 6 hours PRN moderate to severe pain      DVT Prophylaxis:    Advanced Directives:  Discussed with:    Visit Information:    ** Time is exclusive of billed procedures and/or teaching and/or routine family updates.

## 2023-05-22 NOTE — PROGRESS NOTE ADULT - TIME-BASED BILLING (NON-CRITICAL CARE)
Patient: Kofi Bishop Date: 2017   : 1944 Attending: Lance Vasquez DO   73 year old male        Chief Complaint: failure to thrive    Subjective: feeling better, no chest pain or shortness of breath.  Had diarrhea again yesterday afternoon.  States \"I've been really trying to push it, I had some reflux earlier this am, but it's settling down now\".     Pertinent Reviewed: Allergies, Medical History, Surgical History, Social History, Family History and Medications    Vital 24 Hour Range Most Recent Value   Temperature Temp  Min: 97.7 °F (36.5 °C)  Max: 98.5 °F (36.9 °C) 97.7 °F (36.5 °C) (173)   Pulse Pulse  Min: 70  Max: 100 100 (17)   Respiratory Resp  Min: 20  Max: 20 20 (17)   Non-Invasive  Blood Pressure BP  Min: 109/69  Max: 119/66 113/64 (17)   Pulse Oximetry SpO2  Min: 94 %  Max: 97 % 94 % (17)   Arterial  Blood Pressure No Data Recorded     O2 No Data Recorded       Vital Most Recent Value First Value   Weight 77.7 kg (17) Weight: 75.4 kg (17 1800)   Height 5' 4\" (162.6 cm) (17 1800) Height: 5' 4\" (162.6 cm) (17 1800)   BMI 29.46 (17) N/A     Weight over the past 48 Hours:   Patient Vitals for the past 48 hrs:   Weight   1700 77.7 kg       Medications/Infusions:  Scheduled:   • magnesium lactate  84 mg Oral Daily with breakfast   • potassium chloride  20 mEq Oral Daily with breakfast   • esomeprazole  40 mg Oral Nightly   • enoxaparin (LOVENOX) injection  80 mg Subcutaneous Q12H   • mirtazapine  15 mg Oral Nightly   • vitamin - therapeutic multivitamins w/minerals  1 tablet Oral Daily   • ondansetron  4 mg Intravenous BID   • megestrol  100 mg Oral Daily   • latanoprost  1 drop Both Eyes Nightly       Last Stool Occurrence: 1 (loose) (06/01/17 2131)    Intake/Output:      Intake/Output Summary (Last 24 hours) at 17 1050  Last data filed at 17 0500   Gross per 24 hour   Intake               360 ml   Output                0 ml   Net              360 ml       Review of Systems:    Pertinent items are noted in HPI (history of present illness).    Physical Exam:   General appearance:  alert, no acute distress, well-developed, thin, Head:  atraumatic, normocephalic and without obvious abnormality, Lungs:  clear to ausculation posteriorly  bilateral, Heart:  no click, no gallop, no heaves, no murmur, no rub, no thrills, regular rate and rhythm, S1 normal and S2 normal, Abdomen:  bowel sounds normal, no masses, not tender and soft and Extremities:  no clubbing, no cyanosis and no edema    Laboratory Results:    Lab Results   Component Value Date    SODIUM 144 06/04/2017    POTASSIUM 3.7 06/04/2017    BUN 9 06/04/2017    CREATININE 0.87 06/04/2017    WBC 4.7 06/04/2017    HCT 35.6 (L) 06/04/2017    HGB 11.7 (L) 06/04/2017     06/04/2017    INR 1.2 05/26/2017    RAPDTR <0.02 02/14/2017    GLUCOSE 85 06/04/2017    TSH 1.556 11/01/2016    CHOLESTEROL 188 11/01/2016    HDL 43 11/01/2016    CALCLDL 114 11/01/2016    TRIGLYCERIDE 153 (H) 11/01/2016    MG 1.4 (L) 06/04/2017    MRSAPC NOT DETECTED 05/26/2017       Diagnosis/Comorbidities/Complications:  Acute PE/DVT, on lovenox BID  Diarrhea, CDiff pending  Dysphagia, slowly improving, on general diet now  Generalized weakness, failure to thrive at home  Unexpected weight loss  Anorexia, slowly improving  Elevated LFTs, minimal  Hypokalemia, improved  Hypomagnesemia, replacing, add daily suuplement  Low albumin  Chronic anemia, stable  Esophageal cancer status post chemotherapy and esophagectomy  History of J-tube intolerance, had it removed    Plans/Recommendations:  On a PPI,  Adding daily Mg  CDiff pending  ST following  Continue megace and mirtazipine  On lovenox 80 BID, monitor for bleeding issues, will need to know coverage for lovenox prior to DC.  PT/OT    SW for DC planning, anticipate DC tomorrow    Quality:    VTE Pharmacologic  Time-based billing (NON-critical care) Prophylaxis: Yes  VTE Mechanical Prophylaxis: Yes    Discussed with or notes reviewed:  RN and Patient

## 2023-05-22 NOTE — PROGRESS NOTE ADULT - SUBJECTIVE AND OBJECTIVE BOX
Date of Service: 5/22/23  HPI: 78 yo male with hx of DM, HTN, HLD, who presented initially to an outside hospital in [place], [place] on 3/1/23 with chest pain and lightheadedness after doing yard work, found to have an inferiolateral ST elevations.  He was taken to the cath lab and underwent PCI with MEDINA placement to mLCX ([employer] [place] 4 x 26mm), dLAD ([employer] [place] 2.5 x 15mm and 2.25 x 12mm), and Impella CP was placed.   Patient was cannulated for peripheral [facility] ECMO on 3/2 and decannulated on 3/9.  Impella was removed 3/10.  Hospital course was complicated by multiple watershed strokes, acute respiratory failure requiring tracheostomy, BARBRA requiring renal replacement therapy, upper GI bleed, massive hemoptysis originating from lingula requiring bronchial blocker and bronchial artery embolization, pseudomonas bacteremia and septic shock.    Patient was eventually transported from [facility] on 5/16 to [facility] CCU with a tracheostomy, PEG, tunneled right sided HD catheter, right sided PICC line, rectal tube, and ostomy bag over his penis for urine collection.     (16 May 2023 16:49)    Podiatry consulted for R foot evaluation. Pt at bedside, non verbal, family present. Per family, they report wounds being present for approx 3-4wks. Family admits to noticing yellow drainage from R big toe. Pt no aware pt missing R big toe nail. No additional pedal complaints reported at this time.     5/22/23: Pt seen by Podiatry team with attending present for follow up of R foot wounds. Pt awake and alert, family at bedside.     PMH: STEMI (ST elevation myocardial infarction)    Cardiogenic shock    Stroke    History of chronic respiratory failure      PSH:    Allergies:No Known Allergies      Labs:                        8.4    9.02  )-----------( 220      ( 22 May 2023 05:47 )             27.7     05-22    145  |  111<H>  |  34<H>  ----------------------------<  179<H>  3.1<L>   |  25  |  0.93    Ca    8.9      22 May 2023 05:47  Mg     2.0     05-21          MEDICATIONS  (STANDING):  aMIOdarone    Tablet 200 milliGRAM(s) Oral daily  ascorbic acid 500 milliGRAM(s) Oral daily  cadexomer iodine 0.9% Gel 1 Application(s) Topical daily  cefepime   IVPB 2000 milliGRAM(s) IV Intermittent every 12 hours  chlorhexidine 0.12% Liquid 15 milliLiter(s) Oral Mucosa every 12 hours  chlorhexidine 4% Liquid 1 Application(s) Topical <User Schedule>  cholecalciferol 2000 Unit(s) Oral daily  clopidogrel Tablet 75 milliGRAM(s) Enteral Tube daily  collagenase Ointment 1 Application(s) Topical daily  heparin   Injectable 5000 Unit(s) SubCutaneous every 12 hours  melatonin 5 milliGRAM(s) Oral at bedtime  midodrine 10 milliGRAM(s) Oral every 8 hours  nystatin Powder 1 Application(s) Topical two times a day  pantoprazole  Injectable 40 milliGRAM(s) IV Push daily  QUEtiapine 50 milliGRAM(s) Oral at bedtime  thiamine 100 milliGRAM(s) Oral daily    MEDICATIONS  (PRN):  ALPRAZolam 0.25 milliGRAM(s) Oral every 8 hours PRN agitation  HYDROmorphone  Injectable 1 milliGRAM(s) IV Push every 4 hours PRN Severe Pain (7 - 10)  oxyCODONE    IR 2.5 milliGRAM(s) Oral every 6 hours PRN moderate to severe pain        Vital Signs Last 24 Hrs  T(C): 36.3 (22 May 2023 10:56), Max: 37 (22 May 2023 00:00)  T(F): 97.3 (22 May 2023 10:56), Max: 98.6 (22 May 2023 00:00)  HR: 78 (22 May 2023 17:00) (77 - 121)  BP: 108/44 (22 May 2023 17:00) (101/45 - 143/74)  BP(mean): 60 (22 May 2023 17:00) (48 - 95)  RR: 26 (22 May 2023 17:00) (18 - 34)  SpO2: 100% (22 May 2023 17:00) (94% - 100%)    Parameters below as of 22 May 2023 17:00  Patient On (Oxygen Delivery Method): nasal cannula  O2 Flow (L/min): 2      Physical Exam:   Constitutional: NAD, alert;  Derm:  Skin warm, dry and supple bilateral.    Wound to nail bed of dorsal Left hallux with absent nail, superficial in nature, no hyperkeratotic border, wound base fibrogranular, wound size (approx 2.8 cm X 2cm), no edema, no purulence, no fluctuance, no tracking/tunneling, no probe to bone, mild sanguinous discharge distal to eponychium, eschar noted to medial distal hallux and at eponychium.   Pressure stage 2 wound to Right heel, no PTB, no crepitus, no fluctuance, superficial in nature with pink wound bed.   Vascular: Dorsalis Pedis and Posterior Tibial pulses non palpable.  Capillary refill delayed bilateral.    Neuro: Unable to access due to pt condition  MSK: Unable to access due to pt condition        < from: Xray Foot AP + Lateral + Oblique, Right (05.17.23 @ 15:36) >  INTERPRETATION:  RIGHT foot    CLINICAL INFORMATION: Hallux and heel wound TECHNIQUE: AP,lateral and   oblique views.    FINDINGS: Heel soft tissue subcutaneous air adjacent to intact calcaneal   tuberosity.  RIGHT hallux are radiographically intact.  Second and third hammertoe deformities.  Remaining osseous and joint structures of the RIGHT foot are   radiographicallyintact.  The bones and joint spaces are radiographically intact.  No fracture or dislocation.  Soft tissues unremarkable.    IMPRESSION:    Heel subcutaneous air without radiographic evidence of calcaneal   tuberosity osteolysis/osteomyelitis.  Firsthallux radiographically intact..  If osteomyelitis is clinically considered  despite conservative therapy,   and soft tissue / bone infection requires further assessment, follow-up   MRI recommended.    --- End of Report ---    < end of copied text >

## 2023-05-22 NOTE — PROGRESS NOTE ADULT - ASSESSMENT
Assesment: 76yo Male seen for the following:  -Partial thickness wound to R hallux  -Partial thickness wound to R heel  -Diabetes Milletus type 2  -Difficulty with ambulation    Plan:   -Chart reviewed and Patient evaluated;  -Discussed diagnosis and treatment with patient.  -Xrays of R foot reviewed: on wet read showing no acute cortical demineralization, noted subtle radiolucency by subcutaneous tissue of posterior heel.   -Wound flush with normal saline. Official impression noted above.   -Applied betadine to Right hallux and xeroform to R heel with DSD  -Wound to Right hallux is superficial in nature with scant sanguinous discharge on evaluation and surrounding eschar tissue, no pus/purulence, no PTB, stable at this time.   Wound to Right heel is superficial in nature with pink wound bed, no fluctuance, no crepitus, no PTB, stable at this time.   -WC: idosorb and DSD  -Offloading of bilateral heels with CAIR boots or while bedbound to reduce further tissue damage   -5/18- on xray noted subtle air by subcutaneous tissue at posterior heel. Re-assessed heel wound at bedside today evening. No fluctuance, no crepitus, no PTB. The subcutaneous air noted on xray is air from wound.   -Will continue to monitor and continue local wound care at this time.   -Continue with IV antibiotics As Per ID  -All additional care per Med appreciated  -Patient tolerated interventions well without any complications.   -Podiatry will follow while in house.

## 2023-05-23 ENCOUNTER — TRANSCRIPTION ENCOUNTER (OUTPATIENT)
Age: 78
End: 2023-05-23

## 2023-05-23 LAB
ANION GAP SERPL CALC-SCNC: 4 MMOL/L — LOW (ref 5–17)
ANION GAP SERPL CALC-SCNC: 5 MMOL/L — SIGNIFICANT CHANGE UP (ref 5–17)
ANION GAP SERPL CALC-SCNC: 6 MMOL/L — SIGNIFICANT CHANGE UP (ref 5–17)
ANION GAP SERPL CALC-SCNC: 7 MMOL/L — SIGNIFICANT CHANGE UP (ref 5–17)
BASE EXCESS BLDA CALC-SCNC: -5.1 MMOL/L — LOW (ref -2–3)
BLOOD GAS COMMENTS ARTERIAL: SIGNIFICANT CHANGE UP
BUN SERPL-MCNC: 42 MG/DL — HIGH (ref 7–23)
BUN SERPL-MCNC: 43 MG/DL — HIGH (ref 7–23)
BUN SERPL-MCNC: 43 MG/DL — HIGH (ref 7–23)
BUN SERPL-MCNC: 44 MG/DL — HIGH (ref 7–23)
CALCIUM SERPL-MCNC: 8.4 MG/DL — LOW (ref 8.5–10.1)
CALCIUM SERPL-MCNC: 8.4 MG/DL — LOW (ref 8.5–10.1)
CALCIUM SERPL-MCNC: 8.7 MG/DL — SIGNIFICANT CHANGE UP (ref 8.5–10.1)
CALCIUM SERPL-MCNC: 8.7 MG/DL — SIGNIFICANT CHANGE UP (ref 8.5–10.1)
CHLORIDE SERPL-SCNC: 112 MMOL/L — HIGH (ref 96–108)
CHLORIDE SERPL-SCNC: 112 MMOL/L — HIGH (ref 96–108)
CHLORIDE SERPL-SCNC: 113 MMOL/L — HIGH (ref 96–108)
CHLORIDE SERPL-SCNC: 115 MMOL/L — HIGH (ref 96–108)
CK SERPL-CCNC: 35 U/L — SIGNIFICANT CHANGE UP (ref 26–308)
CO2 SERPL-SCNC: 22 MMOL/L — SIGNIFICANT CHANGE UP (ref 22–31)
CO2 SERPL-SCNC: 24 MMOL/L — SIGNIFICANT CHANGE UP (ref 22–31)
CO2 SERPL-SCNC: 24 MMOL/L — SIGNIFICANT CHANGE UP (ref 22–31)
CO2 SERPL-SCNC: 25 MMOL/L — SIGNIFICANT CHANGE UP (ref 22–31)
CREAT SERPL-MCNC: 1.14 MG/DL — SIGNIFICANT CHANGE UP (ref 0.5–1.3)
CREAT SERPL-MCNC: 1.26 MG/DL — SIGNIFICANT CHANGE UP (ref 0.5–1.3)
CREAT SERPL-MCNC: 1.33 MG/DL — HIGH (ref 0.5–1.3)
CREAT SERPL-MCNC: 1.47 MG/DL — HIGH (ref 0.5–1.3)
EGFR: 49 ML/MIN/1.73M2 — LOW
EGFR: 56 ML/MIN/1.73M2 — LOW
EGFR: 59 ML/MIN/1.73M2 — LOW
EGFR: 67 ML/MIN/1.73M2 — SIGNIFICANT CHANGE UP
GAS PNL BLDA: SIGNIFICANT CHANGE UP
GLUCOSE BLDC GLUCOMTR-MCNC: 152 MG/DL — HIGH (ref 70–99)
GLUCOSE SERPL-MCNC: 125 MG/DL — HIGH (ref 70–99)
GLUCOSE SERPL-MCNC: 125 MG/DL — HIGH (ref 70–99)
GLUCOSE SERPL-MCNC: 139 MG/DL — HIGH (ref 70–99)
GLUCOSE SERPL-MCNC: 195 MG/DL — HIGH (ref 70–99)
HCO3 BLDA-SCNC: 20 MMOL/L — LOW (ref 21–28)
HCT VFR BLD CALC: 26.7 % — LOW (ref 39–50)
HCT VFR BLD CALC: 27.9 % — LOW (ref 39–50)
HGB BLD-MCNC: 7.9 G/DL — LOW (ref 13–17)
HGB BLD-MCNC: 8.3 G/DL — LOW (ref 13–17)
LACTATE SERPL-SCNC: 2.4 MMOL/L — HIGH (ref 0.7–2)
LACTATE SERPL-SCNC: 3.2 MMOL/L — HIGH (ref 0.7–2)
LACTATE SERPL-SCNC: 6 MMOL/L — CRITICAL HIGH (ref 0.7–2)
MAGNESIUM SERPL-MCNC: 2.1 MG/DL — SIGNIFICANT CHANGE UP (ref 1.6–2.6)
MAGNESIUM SERPL-MCNC: 2.1 MG/DL — SIGNIFICANT CHANGE UP (ref 1.6–2.6)
MCHC RBC-ENTMCNC: 29.6 GM/DL — LOW (ref 32–36)
MCHC RBC-ENTMCNC: 29.7 GM/DL — LOW (ref 32–36)
MCHC RBC-ENTMCNC: 30.6 PG — SIGNIFICANT CHANGE UP (ref 27–34)
MCHC RBC-ENTMCNC: 31.1 PG — SIGNIFICANT CHANGE UP (ref 27–34)
MCV RBC AUTO: 103.5 FL — HIGH (ref 80–100)
MCV RBC AUTO: 104.5 FL — HIGH (ref 80–100)
NRBC # BLD: 1 /100 WBCS — HIGH (ref 0–0)
NRBC # BLD: 1 /100 WBCS — HIGH (ref 0–0)
PCO2 BLDA: 36 MMHG — SIGNIFICANT CHANGE UP (ref 35–48)
PH BLDA: 7.35 — SIGNIFICANT CHANGE UP (ref 7.35–7.45)
PHOSPHATE SERPL-MCNC: 5.3 MG/DL — HIGH (ref 2.5–4.5)
PHOSPHATE SERPL-MCNC: 6.2 MG/DL — HIGH (ref 2.5–4.5)
PLATELET # BLD AUTO: 198 K/UL — SIGNIFICANT CHANGE UP (ref 150–400)
PLATELET # BLD AUTO: 211 K/UL — SIGNIFICANT CHANGE UP (ref 150–400)
PO2 BLDA: 258 MMHG — HIGH (ref 83–108)
POTASSIUM SERPL-MCNC: 5.1 MMOL/L — SIGNIFICANT CHANGE UP (ref 3.5–5.3)
POTASSIUM SERPL-MCNC: 6 MMOL/L — HIGH (ref 3.5–5.3)
POTASSIUM SERPL-MCNC: 6 MMOL/L — HIGH (ref 3.5–5.3)
POTASSIUM SERPL-MCNC: 6.9 MMOL/L — CRITICAL HIGH (ref 3.5–5.3)
POTASSIUM SERPL-SCNC: 5.1 MMOL/L — SIGNIFICANT CHANGE UP (ref 3.5–5.3)
POTASSIUM SERPL-SCNC: 6 MMOL/L — HIGH (ref 3.5–5.3)
POTASSIUM SERPL-SCNC: 6 MMOL/L — HIGH (ref 3.5–5.3)
POTASSIUM SERPL-SCNC: 6.9 MMOL/L — CRITICAL HIGH (ref 3.5–5.3)
RBC # BLD: 2.58 M/UL — LOW (ref 4.2–5.8)
RBC # BLD: 2.67 M/UL — LOW (ref 4.2–5.8)
RBC # FLD: 16.6 % — HIGH (ref 10.3–14.5)
RBC # FLD: 16.8 % — HIGH (ref 10.3–14.5)
SAO2 % BLDA: 99 % — HIGH (ref 94–98)
SODIUM SERPL-SCNC: 141 MMOL/L — SIGNIFICANT CHANGE UP (ref 135–145)
SODIUM SERPL-SCNC: 142 MMOL/L — SIGNIFICANT CHANGE UP (ref 135–145)
SODIUM SERPL-SCNC: 142 MMOL/L — SIGNIFICANT CHANGE UP (ref 135–145)
SODIUM SERPL-SCNC: 144 MMOL/L — SIGNIFICANT CHANGE UP (ref 135–145)
TROPONIN I, HIGH SENSITIVITY RESULT: 32.46 NG/L — SIGNIFICANT CHANGE UP
WBC # BLD: 14.04 K/UL — HIGH (ref 3.8–10.5)
WBC # BLD: 17.7 K/UL — HIGH (ref 3.8–10.5)
WBC # FLD AUTO: 14.04 K/UL — HIGH (ref 3.8–10.5)
WBC # FLD AUTO: 17.7 K/UL — HIGH (ref 3.8–10.5)

## 2023-05-23 PROCEDURE — 71250 CT THORAX DX C-: CPT | Mod: 26

## 2023-05-23 PROCEDURE — 71045 X-RAY EXAM CHEST 1 VIEW: CPT | Mod: 26,76

## 2023-05-23 PROCEDURE — 93308 TTE F-UP OR LMTD: CPT | Mod: 26

## 2023-05-23 PROCEDURE — 76700 US EXAM ABDOM COMPLETE: CPT | Mod: 26

## 2023-05-23 PROCEDURE — 99291 CRITICAL CARE FIRST HOUR: CPT

## 2023-05-23 PROCEDURE — 93010 ELECTROCARDIOGRAM REPORT: CPT

## 2023-05-23 RX ORDER — SODIUM CHLORIDE 9 MG/ML
500 INJECTION, SOLUTION INTRAVENOUS ONCE
Refills: 0 | Status: DISCONTINUED | OUTPATIENT
Start: 2023-05-23 | End: 2023-05-23

## 2023-05-23 RX ORDER — ACETAMINOPHEN 500 MG
1000 TABLET ORAL ONCE
Refills: 0 | Status: COMPLETED | OUTPATIENT
Start: 2023-05-23 | End: 2023-05-23

## 2023-05-23 RX ORDER — NOREPINEPHRINE BITARTRATE/D5W 8 MG/250ML
0.05 PLASTIC BAG, INJECTION (ML) INTRAVENOUS
Qty: 8 | Refills: 0 | Status: DISCONTINUED | OUTPATIENT
Start: 2023-05-23 | End: 2023-05-30

## 2023-05-23 RX ORDER — DEXTROSE 50 % IN WATER 50 %
50 SYRINGE (ML) INTRAVENOUS ONCE
Refills: 0 | Status: COMPLETED | OUTPATIENT
Start: 2023-05-23 | End: 2023-05-23

## 2023-05-23 RX ORDER — MEROPENEM 1 G/30ML
1000 INJECTION INTRAVENOUS EVERY 8 HOURS
Refills: 0 | Status: DISCONTINUED | OUTPATIENT
Start: 2023-05-23 | End: 2023-05-23

## 2023-05-23 RX ORDER — MEROPENEM 1 G/30ML
2000 INJECTION INTRAVENOUS EVERY 8 HOURS
Refills: 0 | Status: DISCONTINUED | OUTPATIENT
Start: 2023-05-23 | End: 2023-05-23

## 2023-05-23 RX ORDER — SODIUM CHLORIDE 9 MG/ML
500 INJECTION INTRAMUSCULAR; INTRAVENOUS; SUBCUTANEOUS ONCE
Refills: 0 | Status: COMPLETED | OUTPATIENT
Start: 2023-05-23 | End: 2023-05-23

## 2023-05-23 RX ORDER — SODIUM CHLORIDE 9 MG/ML
1000 INJECTION INTRAMUSCULAR; INTRAVENOUS; SUBCUTANEOUS
Refills: 0 | Status: DISCONTINUED | OUTPATIENT
Start: 2023-05-23 | End: 2023-05-24

## 2023-05-23 RX ORDER — ALBUTEROL 90 UG/1
2.5 AEROSOL, METERED ORAL ONCE
Refills: 0 | Status: COMPLETED | OUTPATIENT
Start: 2023-05-23 | End: 2023-05-23

## 2023-05-23 RX ORDER — SODIUM ZIRCONIUM CYCLOSILICATE 10 G/10G
5 POWDER, FOR SUSPENSION ORAL ONCE
Refills: 0 | Status: COMPLETED | OUTPATIENT
Start: 2023-05-23 | End: 2023-05-23

## 2023-05-23 RX ORDER — MEROPENEM 1 G/30ML
1000 INJECTION INTRAVENOUS EVERY 12 HOURS
Refills: 0 | Status: DISCONTINUED | OUTPATIENT
Start: 2023-05-23 | End: 2023-05-24

## 2023-05-23 RX ORDER — INSULIN HUMAN 100 [IU]/ML
10 INJECTION, SOLUTION SUBCUTANEOUS ONCE
Refills: 0 | Status: COMPLETED | OUTPATIENT
Start: 2023-05-23 | End: 2023-05-23

## 2023-05-23 RX ORDER — SODIUM POLYSTYRENE SULFONATE 4.1 MEQ/G
30 POWDER, FOR SUSPENSION ORAL ONCE
Refills: 0 | Status: COMPLETED | OUTPATIENT
Start: 2023-05-23 | End: 2023-05-23

## 2023-05-23 RX ORDER — VANCOMYCIN HCL 1 G
1000 VIAL (EA) INTRAVENOUS ONCE
Refills: 0 | Status: COMPLETED | OUTPATIENT
Start: 2023-05-23 | End: 2023-05-23

## 2023-05-23 RX ORDER — SODIUM BICARBONATE 1 MEQ/ML
50 SYRINGE (ML) INTRAVENOUS ONCE
Refills: 0 | Status: COMPLETED | OUTPATIENT
Start: 2023-05-23 | End: 2023-05-23

## 2023-05-23 RX ADMIN — MEROPENEM 100 MILLIGRAM(S): 1 INJECTION INTRAVENOUS at 22:05

## 2023-05-23 RX ADMIN — Medication 1 APPLICATION(S): at 11:23

## 2023-05-23 RX ADMIN — INSULIN HUMAN 10 UNIT(S): 100 INJECTION, SOLUTION SUBCUTANEOUS at 13:34

## 2023-05-23 RX ADMIN — SODIUM ZIRCONIUM CYCLOSILICATE 5 GRAM(S): 10 POWDER, FOR SUSPENSION ORAL at 05:33

## 2023-05-23 RX ADMIN — HEPARIN SODIUM 5000 UNIT(S): 5000 INJECTION INTRAVENOUS; SUBCUTANEOUS at 22:06

## 2023-05-23 RX ADMIN — CLOPIDOGREL BISULFATE 75 MILLIGRAM(S): 75 TABLET, FILM COATED ORAL at 11:09

## 2023-05-23 RX ADMIN — SODIUM CHLORIDE 75 MILLILITER(S): 9 INJECTION INTRAMUSCULAR; INTRAVENOUS; SUBCUTANEOUS at 14:00

## 2023-05-23 RX ADMIN — Medication 100 MILLIGRAM(S): at 11:09

## 2023-05-23 RX ADMIN — AMIODARONE HYDROCHLORIDE 200 MILLIGRAM(S): 400 TABLET ORAL at 11:09

## 2023-05-23 RX ADMIN — CHLORHEXIDINE GLUCONATE 15 MILLILITER(S): 213 SOLUTION TOPICAL at 11:06

## 2023-05-23 RX ADMIN — NYSTATIN CREAM 1 APPLICATION(S): 100000 CREAM TOPICAL at 22:06

## 2023-05-23 RX ADMIN — Medication 2000 UNIT(S): at 11:09

## 2023-05-23 RX ADMIN — HEPARIN SODIUM 5000 UNIT(S): 5000 INJECTION INTRAVENOUS; SUBCUTANEOUS at 11:08

## 2023-05-23 RX ADMIN — CHLORHEXIDINE GLUCONATE 15 MILLILITER(S): 213 SOLUTION TOPICAL at 22:05

## 2023-05-23 RX ADMIN — SODIUM CHLORIDE 500 MILLILITER(S): 9 INJECTION INTRAMUSCULAR; INTRAVENOUS; SUBCUTANEOUS at 02:30

## 2023-05-23 RX ADMIN — Medication 50 MILLIEQUIVALENT(S): at 13:22

## 2023-05-23 RX ADMIN — Medication 4.99 MICROGRAM(S)/KG/MIN: at 04:19

## 2023-05-23 RX ADMIN — Medication 50 MILLILITER(S): at 03:22

## 2023-05-23 RX ADMIN — Medication 500 MILLIGRAM(S): at 11:10

## 2023-05-23 RX ADMIN — Medication 50 MILLILITER(S): at 13:22

## 2023-05-23 RX ADMIN — MEROPENEM 100 MILLIGRAM(S): 1 INJECTION INTRAVENOUS at 05:33

## 2023-05-23 RX ADMIN — CHLORHEXIDINE GLUCONATE 1 APPLICATION(S): 213 SOLUTION TOPICAL at 11:10

## 2023-05-23 RX ADMIN — Medication 1000 MILLIGRAM(S): at 06:00

## 2023-05-23 RX ADMIN — Medication 250 MILLIGRAM(S): at 05:32

## 2023-05-23 RX ADMIN — Medication 1 APPLICATION(S): at 11:22

## 2023-05-23 RX ADMIN — Medication 4.99 MICROGRAM(S)/KG/MIN: at 14:00

## 2023-05-23 RX ADMIN — Medication 400 MILLIGRAM(S): at 05:33

## 2023-05-23 RX ADMIN — INSULIN HUMAN 10 UNIT(S): 100 INJECTION, SOLUTION SUBCUTANEOUS at 03:49

## 2023-05-23 RX ADMIN — SODIUM POLYSTYRENE SULFONATE 30 GRAM(S): 4.1 POWDER, FOR SUSPENSION ORAL at 13:21

## 2023-05-23 RX ADMIN — PANTOPRAZOLE SODIUM 40 MILLIGRAM(S): 20 TABLET, DELAYED RELEASE ORAL at 11:11

## 2023-05-23 RX ADMIN — SODIUM CHLORIDE 666.67 MILLILITER(S): 9 INJECTION INTRAMUSCULAR; INTRAVENOUS; SUBCUTANEOUS at 04:18

## 2023-05-23 RX ADMIN — NYSTATIN CREAM 1 APPLICATION(S): 100000 CREAM TOPICAL at 11:16

## 2023-05-23 RX ADMIN — SODIUM CHLORIDE 75 MILLILITER(S): 9 INJECTION INTRAMUSCULAR; INTRAVENOUS; SUBCUTANEOUS at 22:05

## 2023-05-23 NOTE — PROGRESS NOTE ADULT - SUBJECTIVE AND OBJECTIVE BOX
HPI:  78 y/o male with PMH HTN, HLD, DM2, recent prolonged hospital course in [place], [place] after a IWMI (underwent PCI with MEDINA to LCx) complicated by cardiogenic shock requiring ECMO, acute hypoxic resp failure requiring trach, dysphagia requiring PEG, BARBRA requiring dialysis, pA.fib, acute CVA, septic shock from Pseudomonas bacteremia, hemoptysis, GI bleed   Now transferred to  for further care.  off pressors, bp ok,   On trach collar.    Dialysis catheter removed was removed  on cefepime for pseudomonas in sputum and scrotum      5/22: No events over night.  Tolerating TC well  5/23: Events noted over night, back on the event, on pressors, CT Chest showing Large B/L Effusions        PAST MEDICAL & SURGICAL HISTORY:  STEMI (ST elevation myocardial infarction)      Cardiogenic shock      Stroke      History of chronic respiratory failure          FAMILY HISTORY:      Social Hx:    Allergies    No Known Allergies    Intolerances            ICU Vital Signs Last 24 Hrs  T(C): 38.3 (23 May 2023 04:00), Max: 38.3 (23 May 2023 04:00)  T(F): 101 (23 May 2023 04:00), Max: 101 (23 May 2023 04:00)  HR: 59 (23 May 2023 06:00) (59 - 112)  BP: 128/69 (23 May 2023 06:00) (73/31 - 128/69)  BP(mean): 82 (23 May 2023 06:00) (40 - 95)  ABP: --  ABP(mean): --  RR: 30 (23 May 2023 06:00) (21 - 39)  SpO2: 100% (23 May 2023 06:00) (74% - 100%)    O2 Parameters below as of 22 May 2023 18:00  Patient On (Oxygen Delivery Method): nasal cannula  O2 Flow (L/min): 2          Mode: AC/ CMV (Assist Control/ Continuous Mandatory Ventilation)  RR (machine): 18  TV (machine): 400  FiO2: 50  PEEP: 5  ITime: 0.8  PIP: 25      I&O's Summary    22 May 2023 07:01  -  23 May 2023 07:00  --------------------------------------------------------  IN: 2496 mL / OUT: 0 mL / NET: 2496 mL                              7.9    17.70 )-----------( 211      ( 23 May 2023 05:23 )             26.7       05-23    144  |  115<H>  |  43<H>  ----------------------------<  125<H>  6.0<H>   |  25  |  1.33<H>    Ca    8.7      23 May 2023 05:23  Phos  5.3     05-23  Mg     2.1     05-23        CARDIAC MARKERS ( 23 May 2023 01:58 )  x     / x     / 35 U/L / x     / x            ABG - ( 23 May 2023 03:03 )  pH, Arterial: 7.35  pH, Blood: x     /  pCO2: 36    /  pO2: 258   / HCO3: 20    / Base Excess: -5.1  /  SaO2: 99                      MEDICATIONS  (STANDING):  aMIOdarone    Tablet 200 milliGRAM(s) Oral daily  ascorbic acid 500 milliGRAM(s) Oral daily  cadexomer iodine 0.9% Gel 1 Application(s) Topical daily  chlorhexidine 0.12% Liquid 15 milliLiter(s) Oral Mucosa every 12 hours  chlorhexidine 4% Liquid 1 Application(s) Topical <User Schedule>  cholecalciferol 2000 Unit(s) Oral daily  clopidogrel Tablet 75 milliGRAM(s) Enteral Tube daily  collagenase Ointment 1 Application(s) Topical daily  heparin   Injectable 5000 Unit(s) SubCutaneous every 12 hours  melatonin 5 milliGRAM(s) Oral at bedtime  meropenem  IVPB 1000 milliGRAM(s) IV Intermittent every 8 hours  midodrine 10 milliGRAM(s) Oral every 8 hours  norepinephrine Infusion 0.05 MICROgram(s)/kG/Min (4.99 mL/Hr) IV Continuous <Continuous>  nystatin Powder 1 Application(s) Topical two times a day  pantoprazole  Injectable 40 milliGRAM(s) IV Push daily  QUEtiapine 50 milliGRAM(s) Oral at bedtime  sodium chloride 0.9%. 1000 milliLiter(s) (75 mL/Hr) IV Continuous <Continuous>  thiamine 100 milliGRAM(s) Oral daily    MEDICATIONS  (PRN):  ALPRAZolam 0.25 milliGRAM(s) Oral every 8 hours PRN agitation  HYDROmorphone  Injectable 1 milliGRAM(s) IV Push every 4 hours PRN Severe Pain (7 - 10)  oxyCODONE    IR 2.5 milliGRAM(s) Oral every 6 hours PRN moderate to severe pain      DVT Prophylaxis:    Advanced Directives:  Discussed with:    Visit Information: 30 min    ** Time is exclusive of billed procedures and/or teaching and/or routine family updates.         HPI:  76 y/o male with PMH HTN, HLD, DM2, recent prolonged hospital course in [place], [place] after a IWMI (underwent PCI with MEDINA to LCx) complicated by cardiogenic shock requiring ECMO, acute hypoxic resp failure requiring trach, dysphagia requiring PEG, BARBRA requiring dialysis, pA.fib, acute CVA, septic shock from Pseudomonas bacteremia, hemoptysis, GI bleed   Now transferred to  for further care.  off pressors, bp ok,   On trach collar.    Dialysis catheter removed was removed  on cefepime for pseudomonas in sputum and scrotum      5/22: No events over night.  Tolerating TC well  5/23: Events noted over night, back on the event, on pressors, CT Chest showing Large B/L Effusions, Hyperkalemic        PAST MEDICAL & SURGICAL HISTORY:  STEMI (ST elevation myocardial infarction)      Cardiogenic shock      Stroke      History of chronic respiratory failure          FAMILY HISTORY:      Social Hx:    Allergies    No Known Allergies    Intolerances            ICU Vital Signs Last 24 Hrs  T(C): 38.3 (23 May 2023 04:00), Max: 38.3 (23 May 2023 04:00)  T(F): 101 (23 May 2023 04:00), Max: 101 (23 May 2023 04:00)  HR: 59 (23 May 2023 06:00) (59 - 112)  BP: 128/69 (23 May 2023 06:00) (73/31 - 128/69)  BP(mean): 82 (23 May 2023 06:00) (40 - 95)  ABP: --  ABP(mean): --  RR: 30 (23 May 2023 06:00) (21 - 39)  SpO2: 100% (23 May 2023 06:00) (74% - 100%)    O2 Parameters below as of 22 May 2023 18:00  Patient On (Oxygen Delivery Method): nasal cannula  O2 Flow (L/min): 2          Mode: AC/ CMV (Assist Control/ Continuous Mandatory Ventilation)  RR (machine): 18  TV (machine): 400  FiO2: 50  PEEP: 5  ITime: 0.8  PIP: 25      I&O's Summary    22 May 2023 07:01  -  23 May 2023 07:00  --------------------------------------------------------  IN: 2496 mL / OUT: 0 mL / NET: 2496 mL                              7.9    17.70 )-----------( 211      ( 23 May 2023 05:23 )             26.7       05-23    144  |  115<H>  |  43<H>  ----------------------------<  125<H>  6.0<H>   |  25  |  1.33<H>    Ca    8.7      23 May 2023 05:23  Phos  5.3     05-23  Mg     2.1     05-23        CARDIAC MARKERS ( 23 May 2023 01:58 )  x     / x     / 35 U/L / x     / x            ABG - ( 23 May 2023 03:03 )  pH, Arterial: 7.35  pH, Blood: x     /  pCO2: 36    /  pO2: 258   / HCO3: 20    / Base Excess: -5.1  /  SaO2: 99                      MEDICATIONS  (STANDING):  aMIOdarone    Tablet 200 milliGRAM(s) Oral daily  ascorbic acid 500 milliGRAM(s) Oral daily  cadexomer iodine 0.9% Gel 1 Application(s) Topical daily  chlorhexidine 0.12% Liquid 15 milliLiter(s) Oral Mucosa every 12 hours  chlorhexidine 4% Liquid 1 Application(s) Topical <User Schedule>  cholecalciferol 2000 Unit(s) Oral daily  clopidogrel Tablet 75 milliGRAM(s) Enteral Tube daily  collagenase Ointment 1 Application(s) Topical daily  heparin   Injectable 5000 Unit(s) SubCutaneous every 12 hours  melatonin 5 milliGRAM(s) Oral at bedtime  meropenem  IVPB 1000 milliGRAM(s) IV Intermittent every 8 hours  midodrine 10 milliGRAM(s) Oral every 8 hours  norepinephrine Infusion 0.05 MICROgram(s)/kG/Min (4.99 mL/Hr) IV Continuous <Continuous>  nystatin Powder 1 Application(s) Topical two times a day  pantoprazole  Injectable 40 milliGRAM(s) IV Push daily  QUEtiapine 50 milliGRAM(s) Oral at bedtime  sodium chloride 0.9%. 1000 milliLiter(s) (75 mL/Hr) IV Continuous <Continuous>  thiamine 100 milliGRAM(s) Oral daily    MEDICATIONS  (PRN):  ALPRAZolam 0.25 milliGRAM(s) Oral every 8 hours PRN agitation  HYDROmorphone  Injectable 1 milliGRAM(s) IV Push every 4 hours PRN Severe Pain (7 - 10)  oxyCODONE    IR 2.5 milliGRAM(s) Oral every 6 hours PRN moderate to severe pain      DVT Prophylaxis:    Advanced Directives:  Discussed with:    Visit Information: 30 min    ** Time is exclusive of billed procedures and/or teaching and/or routine family updates.

## 2023-05-23 NOTE — PROGRESS NOTE ADULT - ASSESSMENT
78 yo male with hx of DM, HTN, HLD, who presented initially to an outside hospital in [place], [place] on 3/1/23 with chest pain and lightheadedness after doing yard work, found to have an inferiolateral ST elevations.  He was taken to the cath lab and underwent PCI with MEDINA placement to mLCX (Resolute [place] 4 x 26mm), dLAD (Resolute [place] 2.5 x 15mm and 2.25 x 12mm), and Impella CP was placed.  Patient was cannulated for peripheral VA ECMO on 3/2 and decannulated on 3/9.  Impella was removed 3/10.  Hospital course was complicated by multiple watershed strokes, acute respiratory failure requiring tracheostomy, BARBRA requiring renal replacement therapy, upper GI bleed, massive hemoptysis originating from lingula requiring bronchial blocker and bronchial artery embolization, pseudomonas bacteremia and septic shock.  Patient was eventually transported from [facility] on 5/16 to [facility] CCU with a tracheostomy, PEG, tunneled right sided HD catheter, right sided PICC line, rectal tube, and ostomy bag over his penis for urine collection.       1. Septic shock. Acute on chronic respiratory failure. Bilateral pleural effusions. Complicated UTI with Enterobacter/Ecoli. Scrotal infection/cellulitis. R heel/R hallux wounds.    - imaging and events over 24hrs noted, on pressors/ventilatory support   - plan for drainage of pleural effusions f/u pleural studies cx  - urine cx growing Enterobacter/Ecoli sensitivities reviewed   - s/p rocephin 5/16-5/18   - s/p IV cefepime [id number] #[id number]  - abx broadened to meropenem adjust dose based on renal function to [id number]   - repeat blood cx   - xray noted, prob congestion over pna, sputum cx growing psae/enterobacter in sputum could be colonization - cefepime will cover  - urology eval noted, CT abd/pelvis reviewed 5/19  - continue with antibiotic coverage  - podiatry eval noted  - blood cx no growth   - tolerating abx well so far; no side effects noted  - reason for abx use and side effects reviewed with patient  - supportive care    2. other issues - care per medicine

## 2023-05-23 NOTE — PROGRESS NOTE ADULT - ASSESSMENT
76 yo male with hx of DM, HTN, HLD, s/p STEMI complicated by cardiogenic shock, BARBRA requiring dialysis, acute respiratory failure requiring trach/peg.  Transported from [place] to [place] at family request.  Found to have scrotal wounds/ pressure ulcers.  Growing pseudomonas and klebsiella in sputum,  nothing in blood.     1. s/p MI with cardiogentic shock, last echo ef 65%,   2. renal failure  3. GI tolerating tube feeds/s/p PEG  4. scrotal infection  5. chronic respiratory failure     Plan  - trach collar as tolerated.  Will Change to fenestrated trach  - ID cefepime for pseudomans in sputum, and for scrotal wound  - kidney function improving near baseline, permacath out, hypernatremia, replave K+, free water  - CT pelvis no air , urology note appreciated.  No intervention   -  DAPT, statin, BB   78 yo male with hx of DM, HTN, HLD, s/p STEMI complicated by cardiogenic shock, BARBRA requiring dialysis, acute respiratory failure requiring trach/peg.  Transported from [place] to [place] at family request.  Found to have scrotal wounds/ pressure ulcers.  Growing pseudomonas and klebsiella in sputum,  nothing in blood.     1. s/p MI with cardiogentic shock, last echo ef 65%,   2. renal failure  3. GI tolerating tube feeds/s/p PEG  4. scrotal infection  5. chronic respiratory failure     Plan  CCU    PULM: Patient back on the vent over night.  large B/L Effusions.  Will need drainage    Cardio: Hypotensive on Levo.  likely from Septic Shock, Continue Amio    Renal: Recurrent BARBRA with Hyperkalemia. Repeat BMP PND    GI: Continue Feeds, PPI    ID: Cefepime changed to [place].  SC sent, BC Sent    SQH DVT Prophylaxis

## 2023-05-23 NOTE — PROGRESS NOTE ADULT - SUBJECTIVE AND OBJECTIVE BOX
Patient is a 77y Male who was hypotensive, bradycardic overnight. On levo, decreased UOP and rising K asked to reconsult. .    MEDICATIONS  (STANDING):  aMIOdarone    Tablet 200 milliGRAM(s) Oral daily  ascorbic acid 500 milliGRAM(s) Oral daily  cadexomer iodine 0.9% Gel 1 Application(s) Topical daily  chlorhexidine 0.12% Liquid 15 milliLiter(s) Oral Mucosa every 12 hours  chlorhexidine 4% Liquid 1 Application(s) Topical <User Schedule>  cholecalciferol 2000 Unit(s) Oral daily  clopidogrel Tablet 75 milliGRAM(s) Enteral Tube daily  collagenase Ointment 1 Application(s) Topical daily  heparin   Injectable 5000 Unit(s) SubCutaneous every 12 hours  melatonin 5 milliGRAM(s) Oral at bedtime  meropenem  IVPB 1000 milliGRAM(s) IV Intermittent every 12 hours  norepinephrine Infusion 0.05 MICROgram(s)/kG/Min (4.99 mL/Hr) IV Continuous <Continuous>  nystatin Powder 1 Application(s) Topical two times a day  pantoprazole  Injectable 40 milliGRAM(s) IV Push daily  QUEtiapine 50 milliGRAM(s) Oral at bedtime  sodium chloride 0.9%. 1000 milliLiter(s) (75 mL/Hr) IV Continuous <Continuous>  thiamine 100 milliGRAM(s) Oral daily    MEDICATIONS  (PRN):  ALPRAZolam 0.25 milliGRAM(s) Oral every 8 hours PRN agitation  HYDROmorphone  Injectable 1 milliGRAM(s) IV Push every 4 hours PRN Severe Pain (7 - 10)  oxyCODONE    IR 2.5 milliGRAM(s) Oral every 6 hours PRN moderate to severe pain        T(C): , Max: 38.3 (05-23-23 @ 04:00)  T(F): , Max: 101 (05-23-23 @ 04:00)  HR: 86 (05-23-23 @ 16:00)  BP: 110/57 (05-23-23 @ 16:00)  BP(mean): 71 (05-23-23 @ 16:00)  RR: 25 (05-23-23 @ 16:00)  SpO2: 100% (05-23-23 @ 16:00)  Wt(kg): --    05-22 @ 07:01  -  05-23 @ 07:00  --------------------------------------------------------  IN: 2496 mL / OUT: 0 mL / NET: 2496 mL          PHYSICAL EXAM:    Constitutional: frail, ill appearin  HEENT: poor dentition  dist  Cardiovascular: S1 and S2   Extremities: No peripheral edema  Neurological: trach  cath        LABS:                        7.9    17.70 )-----------( 211      ( 23 May 2023 05:23 )             26.7     23 May 2023 11:06    142    |  113    |  43     ----------------------------<  125    6.0     |  24     |  1.26   23 May 2023 05:23    144    |  115    |  43     ----------------------------<  125    6.0     |  25     |  1.33   23 May 2023 01:58    141    |  112    |  42     ----------------------------<  195    6.9     |  22     |  1.47   22 May 2023 05:47    145    |  111    |  34     ----------------------------<  179    3.1     |  25     |  0.93   21 May 2023 05:49    149    |  116    |  27     ----------------------------<  125    3.7     |  26     |  0.86     Ca    8.4        23 May 2023 11:06  Ca    8.7        23 May 2023 05:23  Ca    8.7        23 May 2023 01:58  Ca    8.9        22 May 2023 05:47  Ca    9.0        21 May 2023 05:49  Phos  5.3       23 May 2023 05:23  Phos  6.2       23 May 2023 01:58  Mg     2.1       23 May 2023 05:23  Mg     2.1       23 May 2023 01:58  Mg     2.0       21 May 2023 05:49        Creatine Kinase, Serum: 35 U/L [26 - 308] (05-23 @ 01:58)      Urine Studies:          RADIOLOGY & ADDITIONAL STUDIES:

## 2023-05-23 NOTE — PROGRESS NOTE ADULT - SUBJECTIVE AND OBJECTIVE BOX
Date of Service: 5/23/23  HPI: 78 yo male with hx of DM, HTN, HLD, who presented initially to an outside hospital in [place], [place] on 3/1/23 with chest pain and lightheadedness after doing yard work, found to have an inferiolateral ST elevations.  He was taken to the cath lab and underwent PCI with MEDINA placement to mLCX (Resolute [place] 4 x 26mm), dLAD (Resolute [place] 2.5 x 15mm and 2.25 x 12mm), and Impella CP was placed.   Patient was cannulated for peripheral VA ECMO on 3/2 and decannulated on 3/9.  Impella was removed 3/10.  Hospital course was complicated by multiple watershed strokes, acute respiratory failure requiring tracheostomy, BARBRA requiring renal replacement therapy, upper GI bleed, massive hemoptysis originating from lingula requiring bronchial blocker and bronchial artery embolization, pseudomonas bacteremia and septic shock.    Patient was eventually transported from [facility] on 5/16 to [facility] CCU with a tracheostomy, PEG, tunneled right sided HD catheter, right sided PICC line, rectal tube, and ostomy bag over his penis for urine collection.     (16 May 2023 16:49)    Podiatry consulted for R foot evaluation. Pt at bedside, non verbal, family present. Per family, they report wounds being present for approx 3-4wks. Family admits to noticing yellow drainage from R big toe. Pt no aware pt missing R big toe nail. No additional pedal complaints reported at this time.     5/23/23: Pt seen by Podiatry team for follow up of R foot wounds.     PMH: STEMI (ST elevation myocardial infarction)    Cardiogenic shock    Stroke    History of chronic respiratory failure      PSH:    Allergies:No Known Allergies      Labs:                                   7.9    17.70 )-----------( 211      ( 23 May 2023 05:23 )             26.7     05-23    144  |  115<H>  |  43<H>  ----------------------------<  125<H>  6.0<H>   |  25  |  1.33<H>    Ca    8.7      23 May 2023 05:23  Phos  5.3     05-23  Mg     2.1     05-23            MEDICATIONS  (STANDING):  aMIOdarone    Tablet 200 milliGRAM(s) Oral daily  ascorbic acid 500 milliGRAM(s) Oral daily  cadexomer iodine 0.9% Gel 1 Application(s) Topical daily  chlorhexidine 0.12% Liquid 15 milliLiter(s) Oral Mucosa every 12 hours  chlorhexidine 4% Liquid 1 Application(s) Topical <User Schedule>  cholecalciferol 2000 Unit(s) Oral daily  clopidogrel Tablet 75 milliGRAM(s) Enteral Tube daily  collagenase Ointment 1 Application(s) Topical daily  heparin   Injectable 5000 Unit(s) SubCutaneous every 12 hours  melatonin 5 milliGRAM(s) Oral at bedtime  meropenem  IVPB 1000 milliGRAM(s) IV Intermittent every 8 hours  midodrine 10 milliGRAM(s) Oral every 8 hours  norepinephrine Infusion 0.05 MICROgram(s)/kG/Min (4.99 mL/Hr) IV Continuous <Continuous>  nystatin Powder 1 Application(s) Topical two times a day  pantoprazole  Injectable 40 milliGRAM(s) IV Push daily  QUEtiapine 50 milliGRAM(s) Oral at bedtime  sodium chloride 0.9%. 1000 milliLiter(s) (75 mL/Hr) IV Continuous <Continuous>  thiamine 100 milliGRAM(s) Oral daily    MEDICATIONS  (PRN):  ALPRAZolam 0.25 milliGRAM(s) Oral every 8 hours PRN agitation  HYDROmorphone  Injectable 1 milliGRAM(s) IV Push every 4 hours PRN Severe Pain (7 - 10)  oxyCODONE    IR 2.5 milliGRAM(s) Oral every 6 hours PRN moderate to severe pain          Vital Signs Last 24 Hrs  T(C): 38.3 (23 May 2023 04:00), Max: 38.3 (23 May 2023 04:00)  T(F): 101 (23 May 2023 04:00), Max: 101 (23 May 2023 04:00)  HR: 59 (23 May 2023 06:00) (59 - 112)  BP: 128/69 (23 May 2023 06:00) (73/31 - 128/69)  BP(mean): 82 (23 May 2023 06:00) (40 - 95)  RR: 30 (23 May 2023 06:00) (21 - 39)  SpO2: 100% (23 May 2023 06:00) (74% - 100%)    Parameters below as of 22 May 2023 18:00  Patient On (Oxygen Delivery Method): nasal cannula  O2 Flow (L/min): 2      Physical Exam:   Constitutional: NAD, alert;  Derm:  Skin warm, dry and supple bilateral.    Wound to nail bed of dorsal Left hallux with absent nail, superficial in nature, no hyperkeratotic border, wound base fibrogranular, wound size (approx 2.8 cm X 2cm), no edema, no purulence, no fluctuance, no tracking/tunneling, no probe to bone, mild sanguinous discharge distal to eponychium, eschar noted to medial distal hallux and at eponychium.   Pressure stage 2 wound to Right heel, no PTB, no crepitus, no fluctuance, superficial in nature with pink wound bed.   Vascular: Dorsalis Pedis and Posterior Tibial pulses non palpable.  Capillary refill delayed bilateral.    Neuro: Unable to access due to pt condition  MSK: Unable to access due to pt condition        < from: Xray Foot AP + Lateral + Oblique, Right (05.17.23 @ 15:36) >  INTERPRETATION:  RIGHT foot    CLINICAL INFORMATION: Hallux and heel wound TECHNIQUE: AP,lateral and   oblique views.    FINDINGS: Heel soft tissue subcutaneous air adjacent to intact calcaneal   tuberosity.  RIGHT hallux are radiographically intact.  Second and third hammertoe deformities.  Remaining osseous and joint structures of the RIGHT foot are   radiographicallyintact.  The bones and joint spaces are radiographically intact.  No fracture or dislocation.  Soft tissues unremarkable.    IMPRESSION:    Heel subcutaneous air without radiographic evidence of calcaneal   tuberosity osteolysis/osteomyelitis.  Firsthallux radiographically intact..  If osteomyelitis is clinically considered  despite conservative therapy,   and soft tissue / bone infection requires further assessment, follow-up   MRI recommended.    --- End of Report ---    < end of copied text >

## 2023-05-23 NOTE — PROGRESS NOTE ADULT - SUBJECTIVE AND OBJECTIVE BOX
HPI:  78 yo male with hx of DM, HTN, HLD, who presented initially to an outside hospital in [place], [place] on 3/1/23 with chest pain and lightheadedness after doing yard work, found to have an inferiolateral ST elevations.  He was taken to the cath lab and underwent PCI with MEDINA placement to mLCX (Resolute [place] 4 x 26mm), dLAD (Resolute [place] 2.5 x 15mm and 2.25 x 12mm), and Impella CP was placed.   Patient was cannulated for peripheral VA ECMO on 3/2 and decannulated on 3/9.  Impella was removed 3/10.  Hospital course was complicated by multiple watershed strokes, acute respiratory failure requiring tracheostomy, BARBRA requiring renal replacement therapy, upper GI bleed, massive hemoptysis originating from lingula requiring bronchial blocker and bronchial artery embolization, pseudomonas bacteremia and septic shock.    Patient was eventually transported from [facility] on 5/16 to [facility] CCU with a tracheostomy, PEG, tunneled right sided HD catheter, right sided PICC line, rectal tube, and ostomy bag over his penis for urine collection.     (16 May 2023 16:49)      Past Medical History, Family History, Social History:  PAST MEDICAL & SURGICAL HISTORY:  STEMI (ST elevation myocardial infarction)      Cardiogenic shock      Stroke      History of chronic respiratory failure          FAMILY HISTORY:      Social History:      Interval History: Severe shock, hyperkalemia, acute kidney injury, oliguria, desaturation            Review of Systems:  Review of Systems is Limited due to patient's neurologic status.      Physical Exam:  Constitutional: mild distress in CCU bed   Neuro: Awake, alert, non focal, moving purposefully   Cardiovascular:  Sinus bradycardia   ENT: No JVD, Tracheostomy in place   Respiratory: symmetric chest rise   Gastrointestinal: Soft, nontender, nondistended, PEG in place   Genitourinary:  [ x ] No Ercio.   Musculoskeletal: No muscle wasting noted, No pretibial edema appreciated, no appreciable calf tenderness.  Skin:  multiple skin ulcers in different stages   Hematologic / Lymph / Immunologic: No bleeding from IV sites or wounds, No Hives or allergic type skin lesions      Vitals:  Vital Signs Last 24 Hrs  T(C): 37 (23 May 2023 00:20), Max: 37 (23 May 2023 00:20)  T(F): 98.6 (23 May 2023 00:20), Max: 98.6 (23 May 2023 00:20)  HR: 66 (23 May 2023 01:30) (66 - 115)  BP: 97/66 (22 May 2023 22:00) (83/43 - 120/66)  BP(mean): 73 (22 May 2023 22:00) (48 - 95)  RR: 24 (22 May 2023 22:00) (19 - 34)  SpO2: 95% (23 May 2023 01:30) (92% - 100%)    I&O:  I&O's Summary    21 May 2023 07:01  -  22 May 2023 07:00  --------------------------------------------------------  IN: 800 mL / OUT: 600 mL / NET: 200 mL    22 May 2023 07:01  -  23 May 2023 04:09  --------------------------------------------------------  IN: 960 mL / OUT: 0 mL / NET: 960 mL        Labs & Radiology:                        8.3    14.04 )-----------( 198      ( 23 May 2023 01:58 )             27.9       05-23    141  |  112<H>  |  42<H>  ----------------------------<  195<H>  6.9<HH>   |  22  |  1.47<H>    Ca    8.7      23 May 2023 01:58  Phos  6.2     05-23  Mg     2.1     05-23            CARDIAC MARKERS ( 23 May 2023 01:58 )  x     / x     / 35 U/L / x     / x                    ABG - ( 23 May 2023 03:03 )  pH, Arterial: 7.35  pH, Blood: x     /  pCO2: 36    /  pO2: 258   / HCO3: 20    / Base Excess: -5.1  /  SaO2: 99                  CAPILLARY BLOOD GLUCOSE  POCT Blood Glucose.: 152 mg/dL (23 May 2023 03:08)    < from: CT Pelvis w/ IV Cont (05.19.23 @ 12:26) >    ACC: [id number] EXAM:  CT PELVIS ONLY IC   ORDERED BY: JAVI BOOTH     PROCEDURE DATE:  05/19/2023          INTERPRETATION:  CLINICAL INFORMATION: Scrotal infection    COMPARISON: None.    CONTRAST/COMPLICATIONS:  IV Contrast: Omnipaque 350  90 cc administered   10 cc discarded  Oral Contrast: NONE  Complications: None reported at time of study completion    PROCEDURE:  CT of the Pelvis was performed.  Sagittal and coronal reformats were performed.    FINDINGS: The study is limited due to beam hardening artifact from the   patient's left hip arthroplasty.  BLADDER: Within normal limits.  REPRODUCTIVE ORGANS: No scrotal fluid collection is identified. The   testes are grossly unremarkable. No scrotal or perineal soft tissue gas.  LYMPH NODES: No pelvic lymphadenopathy.    VISUALIZED PORTIONS:  ABDOMINAL ORGANS: Within normal limits.  BOWEL: Within normal limits. Normal appendix. Balloon tipped rectal   catheter in place.  PERITONEUM: No ascites.  VESSELS: Within normal limits.  ABDOMINAL WALL: Within normal limits.  BONES: Left hip arthroplasty.    IMPRESSION:  No scrotal fluid collection or soft tissue gas.        --- End of Report ---            HANNAH VALENCIA MD; Attending Radiologist  This document has been electronically signed. May 19 2023  3:14PM    < end of copied text >      Medications:  MEDICATIONS  (STANDING):  acetaminophen   IVPB .. 1000 milliGRAM(s) IV Intermittent once  aMIOdarone    Tablet 200 milliGRAM(s) Oral daily  ascorbic acid 500 milliGRAM(s) Oral daily  cadexomer iodine 0.9% Gel 1 Application(s) Topical daily  chlorhexidine 0.12% Liquid 15 milliLiter(s) Oral Mucosa every 12 hours  chlorhexidine 4% Liquid 1 Application(s) Topical <User Schedule>  cholecalciferol 2000 Unit(s) Oral daily  clopidogrel Tablet 75 milliGRAM(s) Enteral Tube daily  collagenase Ointment 1 Application(s) Topical daily  heparin   Injectable 5000 Unit(s) SubCutaneous every 12 hours  melatonin 5 milliGRAM(s) Oral at bedtime  meropenem  IVPB 1000 milliGRAM(s) IV Intermittent every 8 hours  midodrine 10 milliGRAM(s) Oral every 8 hours  norepinephrine Infusion 0.05 MICROgram(s)/kG/Min (4.99 mL/Hr) IV Continuous <Continuous>  nystatin Powder 1 Application(s) Topical two times a day  pantoprazole  Injectable 40 milliGRAM(s) IV Push daily  QUEtiapine 50 milliGRAM(s) Oral at bedtime  thiamine 100 milliGRAM(s) Oral daily  vancomycin  IVPB 1000 milliGRAM(s) IV Intermittent once    MEDICATIONS  (PRN):  ALPRAZolam 0.25 milliGRAM(s) Oral every 8 hours PRN agitation  HYDROmorphone  Injectable 1 milliGRAM(s) IV Push every 4 hours PRN Severe Pain (7 - 10)  oxyCODONE    IR 2.5 milliGRAM(s) Oral every 6 hours PRN moderate to severe pain

## 2023-05-23 NOTE — PROGRESS NOTE ADULT - ASSESSMENT
I was called to the bedside overnight as patient acutely desaturated on trach collar and became bradycardic to the 30s. 100% oxygen was placed and bradycardia resolved after some bagging. Patient stabilized and was being closely observed.     He began to desaturate again [facility] replacement. During the day he was switched to a fenestrated non cuffed trach. Tonight I replaced the trach with a cuffed trach and patient was placed back on ventilatory support. Pressure support was tried first as patient seemed to have good inspiratory effort. He failed and was not pulling sufficient tidal volumes and was switched to full ACVC support.     During this course he became hemodynamically unstable and MAPs dropped into the 40s. Norepinephrine drip was added with a small push as a loading dose. MAPs improved and stabilized.     Airway was supported via trach replacement and suctioning. Breathing supported with ventilator. Circulation supported with 2 boluses and vasoactive infusion.     Repeat labs were ordered.   CBC increase in leukocytosis - antibiotics upgraded to vanco and lit in case of worsening infectious state; blood cultures redrawn   Lactate 6 - fluid boluses given + pressors started   acute hyperkalemia to 6.9 - hyper K cocktail given and repeat BMP to follow; EKG ordered, rhythm stable on tele - will call nephro pending repeat BMP, lokelma x 1 DOSE   Acute renal failure with elevated BUN /crt and decrease in GFR - fluid bolus trial, if not producing urine will trial lasix to evaluate for challenge     POCUS done at bedside; PSAX and PLAX notable for good LV contraction on LEVO; IVC respiratory variation about 14%    Cardiac enzymes WNL     ABG drawn after changes - pH WNL, mild metabolic acidosis with base deficit secondary to lactic acidosis     I discussed the case [facility]     I have spent a total of 100 mins of nonconsecutive critical care time managing this patient with acute hypoxic respiratory failure, severe shock, hyperkalemia, acute renal failure.  This included review of relevant history, clinical examination, review of data and images, discussion of treatment with the multidisciplinary team and any consultants involved in this patient’s care as well as family discussion.     I affirm that this patient is critically ill and at high risk for sudden, fatal deterioration due to one or more of the above stated active issues. I managed/supervised life or organ support interventions that required frequent assessment. This time does not include bedside procedures that are documented separately.

## 2023-05-23 NOTE — PROVIDER CONTACT NOTE (EICU) - SITUATION
76 y/o male with PMH HTN, HLD, DM2, recent prolonged hospital course in [place], [place] after a IWMI (underwent PCI with MEDINA to LCx) complicated by cardiogenic shock requiring ECMO, acute hypoxic resp failure requiring trach, dysphagia requiring PEG, BARBRA requiring dialysis, pA.fib, acute CVA, septic shock from Pseudomonas bacteremia, hemoptysis, GI bleed   Now transferred to  for further care.  off pressors, bp ok,   On trach collar.    Dialysis catheter removed was removed  on cefepime for pseudomonas in sputum and scrotum.  Contacted for desaturation and the possible need to change the trach with a cuff.

## 2023-05-23 NOTE — PROGRESS NOTE ADULT - SUBJECTIVE AND OBJECTIVE BOX
Date of service: 05-23-23 @ 12:25    pt seen and examined  aspiration event early am  febrile to 101  resp distress placed back on ventilator  hypotensive requiring pressor support      ROS: unable to obtain d/t medical condition    MEDICATIONS  (STANDING):  aMIOdarone    Tablet 200 milliGRAM(s) Oral daily  ascorbic acid 500 milliGRAM(s) Oral daily  cadexomer iodine 0.9% Gel 1 Application(s) Topical daily  chlorhexidine 0.12% Liquid 15 milliLiter(s) Oral Mucosa every 12 hours  chlorhexidine 4% Liquid 1 Application(s) Topical <User Schedule>  cholecalciferol 2000 Unit(s) Oral daily  clopidogrel Tablet 75 milliGRAM(s) Enteral Tube daily  collagenase Ointment 1 Application(s) Topical daily  dextrose 50% Injectable 50 milliLiter(s) IV Push once  heparin   Injectable 5000 Unit(s) SubCutaneous every 12 hours  insulin regular  human recombinant. 10 Unit(s) IV Push once  melatonin 5 milliGRAM(s) Oral at bedtime  meropenem  IVPB 1000 milliGRAM(s) IV Intermittent every 12 hours  norepinephrine Infusion 0.05 MICROgram(s)/kG/Min (4.99 mL/Hr) IV Continuous <Continuous>  nystatin Powder 1 Application(s) Topical two times a day  pantoprazole  Injectable 40 milliGRAM(s) IV Push daily  QUEtiapine 50 milliGRAM(s) Oral at bedtime  sodium bicarbonate  Injectable 50 milliEquivalent(s) IV Push once  sodium chloride 0.9%. 1000 milliLiter(s) (75 mL/Hr) IV Continuous <Continuous>  sodium polystyrene sulfonate Suspension 30 Gram(s) Oral once  thiamine 100 milliGRAM(s) Oral daily    Vital Signs Last 24 Hrs  T(C): 38.3 (23 May 2023 04:00), Max: 38.3 (23 May 2023 04:00)  T(F): 101 (23 May 2023 04:00), Max: 101 (23 May 2023 04:00)  HR: 59 (23 May 2023 06:00) (59 - 110)  BP: 128/69 (23 May 2023 06:00) (73/31 - 128/69)  BP(mean): 82 (23 May 2023 06:00) (40 - 95)  RR: 30 (23 May 2023 06:00) (21 - 39)  SpO2: 100% (23 May 2023 06:00) (74% - 100%)    Parameters below as of 22 May 2023 18:00  Patient On (Oxygen Delivery Method): nasal cannula  O2 Flow (L/min): 2      PE:  Constitutional: frail looking  HEENT: NC/AT, EOMI, PERRLA, conjunctivae clear; ears and nose atraumatic; pharynx benign + trach   Neck: supple; thyroid not palpable  Back: no tenderness  Respiratory: decreased breath sounds   Cardiovascular: S1S2 regular, no murmurs  Abdomen: soft, not tender, not distended, positive BS; liver and spleen WNL  Genitourinary: no suprapubic tenderness Scrotum skin edema and erythema with superficial skin excoriation, tender to touch, no crepitus condom catheter in place   Lymphatic: no LN palpable  Musculoskeletal: no muscle tenderness, no joint swelling or tenderness  Extremities: no pedal edema R heel/R hallux wound  Neurological/ Psychiatric: moving all extremities  Skin: no rashes; no palpable lesions    Labs: all available labs reviewed                          7.9    17.70 )-----------( 211      ( 23 May 2023 05:23 )             26.7     05-23    142  |  113<H>  |  43<H>  ----------------------------<  125<H>  6.0<H>   |  24  |  1.26    Ca    8.4<L>      23 May 2023 11:06  Phos  5.3     05-23  Mg     2.1     05-23          Culture - Sputum (05.16.23 @ 23:14)   Gram Stain:   Rare polymorphonuclear leukocytes per low power field   Few Squamous epithelial cells per low power field   Numerous Gram Negative Rods per oil power field  - Amikacin: S <=16  - Aztreonam: I 16  - Cefepime: S 8  - Ceftazidime: I 16  - Ciprofloxacin: S <=0.25  - Gentamicin: S 4  - Imipenem: S <=1  - Levofloxacin: S 1  - Meropenem: S <=1  - Piperacillin/Tazobactam: I 64  - Tobramycin: S <=2  Specimen Source: .Sputum Sputum  Culture Results:   Numerous Pseudomonas aeruginosa   Normal Respiratory Cristiane absent  Organism Identification: Pseudomonas aeruginosa  Organism: Pseudomonas aeruginosa  Method Type: MICCulture - Urine (05.16.23 @ 18:00)   Specimen Source: Clean Catch Clean Catch (Midstream)  Culture Results:   >100,000 CFU/ml Escherichia coli   >100,000 CFU/ml Klebsiella aerogenes (Previously Enterobacter)  Culture - Blood (05.16.23 @ 16:54)   Specimen Source: .Blood None  Culture Results:   No growth to date.    Radiology: all available radiological tests reviewed  < from: CT Chest No Cont (05.23.23 @ 10:18) >  ACC: [id number] EXAM:  CT CHEST   ORDERED BY: HANNAH ESTEVES     PROCEDURE DATE:  05/23/2023          INTERPRETATION:  INDICATION: Respiratory failure    TECHNIQUE: Volumetric images of the chest without intravenous contrast.   Maximum intensity projection images were generated.    COMPARISON: No prior chest CT.    FINDINGS:    LUNGS/AIRWAYS/PLEURA: Tracheostomy cannula tip in the proximal trachea.   Distention of the trachea by the balloon cuff. Mucus within the left   lower lobar and superior segmental bronchi. Symmetric large pleural   effusions and secondary passive atelectasis of lower lobes and the left   upper lobe. Apparent groundglass in both upper lobes at least in part due   to low lung volumes. There may be a component of pulmonaryedema.    LYMPH NODES/MEDIASTINUM: Unremarkable.    HEART/VASCULATURE: Dilated left atrium. No pericardial effusion. Coronary   artery calcifications. Normal caliber aorta.    UPPER ABDOMEN: Left renal cyst. Excreted contrast in the gallbladder.   Diffuse gallbladder wall thickening.    BONES/SOFT TISSUES: Unremarkable.      IMPRESSION:    Large pleural effusions. Possible mild pulmonary edema in the upper   lobes, limited assessment due to low lung volumes.    Distended trachea by tracheostomy cannula balloon cuff. Recommend   adjustment.    Nondistended thick walled gallbladder, favored to be due to gallbladder   wall edema, such as in the setting of volume overload. Consider right   upper quadrant ultrasound or nuclear medicine study to assess for   cholecystitis, if clinically appropriate.    < from: Xray Chest 1 View- PORTABLE-Routine (05.16.23 @ 20:23) >    ACC: [id number] EXAM:  XR CHEST PORTABLE ROUTINE 1V   ORDERED BY: RADHA AVALOS     PROCEDURE DATE:  05/16/2023          INTERPRETATION:  TIME OF EXAM: May 16, 2023 at 8:10 PM.    CLINICAL INFORMATION: Chronic respiratory failure. Tracheostomy.    COMPARISON:  None available    TECHNIQUE:   AP Portable chest x-ray.    INTERPRETATION:    The heart is not enlarged. The thoracic aorta is calcified.  Tracheostomy tube is in place.  Right IJ catheter with tip in the right atrium.  Right upper extremity PICC line. Tip obscured by right IJ catheter. The   line extends at least to the SVC.  There are bilateral perihilar opacities, more extensive on the right.  There are bilateral small pleural effusions with likely associated   passive atelectasis.  No pneumothorax is seen.  No acute bony abnormality is noted.      IMPRESSION:  Lines as above.    Bilateral perihilar opacities, more extensive on the right, that could be   due to pulmonary edema or multifocal pneumonia.    Bilateral small pleural effusions with likely associated passive   atelectasis.    < end of copied text >  < from: Xray Foot AP + Lateral + Oblique, Right (05.17.23 @ 15:36) >    ACC: [id number] EXAM:  XR FOOT COMP MIN 3 VIEWS RT   ORDERED BY: AMAN BAEZA     PROCEDURE DATE:  05/17/2023          INTERPRETATION:  RIGHT foot    CLINICAL INFORMATION: Hallux and heel wound TECHNIQUE: AP,lateral and   oblique views.    FINDINGS: Heel soft tissue subcutaneous air adjacent to intact calcaneal   tuberosity.  RIGHT hallux are radiographically intact.  Second and third hammertoe deformities.  Remaining osseous and joint structures of the RIGHT foot are   radiographicallyintact.  The bones and joint spaces are radiographically intact.  No fracture or dislocation.  Soft tissues unremarkable.    IMPRESSION:    Heel subcutaneous air without radiographic evidence of calcaneal   tuberosity osteolysis/osteomyelitis.  Firsthallux radiographically intact..  If osteomyelitis is clinically considered  despite conservative therapy,   and soft tissue / bone infection requires further assessment, follow-up   MRI recommended.    --- End of Report ---      < end of copied text >    Advanced directives addressed: full resuscitation

## 2023-05-23 NOTE — PROGRESS NOTE ADULT - ASSESSMENT
82 yo male with extended complicated stay at [facility] , resides in [place] ,  HTN, DM, w 3/1/23 - STEMI required cath PCI to  and and dLAD required Impellar and ECMO was in BARBRA required CRRT w last HD 5/10 now reconsult for decreased UOP and hyperkalemia setting of hypotension.    BARBRA likey ATN related on HD -  last HD 5/10 reported   -Decrement UOP, rising K due to hypotension/hemodynamics overnight    Maintain MAP    Needs TF optimization for lower K    Treat current K values medically      Hypernatremia   free water flushes for normalization    d/c with staff, son, Dr Aguero

## 2023-05-24 LAB
ANION GAP SERPL CALC-SCNC: 8 MMOL/L — SIGNIFICANT CHANGE UP (ref 5–17)
BUN SERPL-MCNC: 34 MG/DL — HIGH (ref 7–23)
CALCIUM SERPL-MCNC: 8.5 MG/DL — SIGNIFICANT CHANGE UP (ref 8.5–10.1)
CHLORIDE SERPL-SCNC: 115 MMOL/L — HIGH (ref 96–108)
CO2 SERPL-SCNC: 24 MMOL/L — SIGNIFICANT CHANGE UP (ref 22–31)
CREAT SERPL-MCNC: 0.92 MG/DL — SIGNIFICANT CHANGE UP (ref 0.5–1.3)
EGFR: 87 ML/MIN/1.73M2 — SIGNIFICANT CHANGE UP
GLUCOSE FLD-MCNC: 97 MG/DL — SIGNIFICANT CHANGE UP
GLUCOSE SERPL-MCNC: 84 MG/DL — SIGNIFICANT CHANGE UP (ref 70–99)
HCT VFR BLD CALC: 23.4 % — LOW (ref 39–50)
HCT VFR BLD CALC: 24 % — LOW (ref 39–50)
HGB BLD-MCNC: 7.1 G/DL — LOW (ref 13–17)
HGB BLD-MCNC: 7.3 G/DL — LOW (ref 13–17)
LDH SERPL L TO P-CCNC: 124 U/L — SIGNIFICANT CHANGE UP
MAGNESIUM SERPL-MCNC: 2 MG/DL — SIGNIFICANT CHANGE UP (ref 1.6–2.6)
MCHC RBC-ENTMCNC: 30.3 GM/DL — LOW (ref 32–36)
MCHC RBC-ENTMCNC: 30.4 GM/DL — LOW (ref 32–36)
MCHC RBC-ENTMCNC: 31 PG — SIGNIFICANT CHANGE UP (ref 27–34)
MCHC RBC-ENTMCNC: 31.1 PG — SIGNIFICANT CHANGE UP (ref 27–34)
MCV RBC AUTO: 102.1 FL — HIGH (ref 80–100)
MCV RBC AUTO: 102.2 FL — HIGH (ref 80–100)
NRBC # BLD: 1 /100 WBCS — HIGH (ref 0–0)
NRBC # BLD: 1 /100 WBCS — HIGH (ref 0–0)
PHOSPHATE SERPL-MCNC: 3.9 MG/DL — SIGNIFICANT CHANGE UP (ref 2.5–4.5)
PLATELET # BLD AUTO: 193 K/UL — SIGNIFICANT CHANGE UP (ref 150–400)
PLATELET # BLD AUTO: 202 K/UL — SIGNIFICANT CHANGE UP (ref 150–400)
POTASSIUM SERPL-MCNC: 4.6 MMOL/L — SIGNIFICANT CHANGE UP (ref 3.5–5.3)
POTASSIUM SERPL-SCNC: 4.6 MMOL/L — SIGNIFICANT CHANGE UP (ref 3.5–5.3)
PROT FLD-MCNC: 2.7 G/DL — SIGNIFICANT CHANGE UP
RBC # BLD: 2.29 M/UL — LOW (ref 4.2–5.8)
RBC # BLD: 2.35 M/UL — LOW (ref 4.2–5.8)
RBC # FLD: 16.5 % — HIGH (ref 10.3–14.5)
RBC # FLD: 16.6 % — HIGH (ref 10.3–14.5)
SODIUM SERPL-SCNC: 147 MMOL/L — HIGH (ref 135–145)
WBC # BLD: 10.2 K/UL — SIGNIFICANT CHANGE UP (ref 3.8–10.5)
WBC # BLD: 10.46 K/UL — SIGNIFICANT CHANGE UP (ref 3.8–10.5)
WBC # FLD AUTO: 10.2 K/UL — SIGNIFICANT CHANGE UP (ref 3.8–10.5)
WBC # FLD AUTO: 10.46 K/UL — SIGNIFICANT CHANGE UP (ref 3.8–10.5)

## 2023-05-24 PROCEDURE — 99291 CRITICAL CARE FIRST HOUR: CPT | Mod: 25

## 2023-05-24 PROCEDURE — 71045 X-RAY EXAM CHEST 1 VIEW: CPT | Mod: 26

## 2023-05-24 PROCEDURE — 88108 CYTOPATH CONCENTRATE TECH: CPT | Mod: 26

## 2023-05-24 PROCEDURE — 32557 INSERT CATH PLEURA W/ IMAGE: CPT

## 2023-05-24 RX ORDER — CEFEPIME 1 G/1
2000 INJECTION, POWDER, FOR SOLUTION INTRAMUSCULAR; INTRAVENOUS EVERY 12 HOURS
Refills: 0 | Status: DISCONTINUED | OUTPATIENT
Start: 2023-05-24 | End: 2023-05-28

## 2023-05-24 RX ADMIN — HEPARIN SODIUM 5000 UNIT(S): 5000 INJECTION INTRAVENOUS; SUBCUTANEOUS at 11:23

## 2023-05-24 RX ADMIN — CHLORHEXIDINE GLUCONATE 15 MILLILITER(S): 213 SOLUTION TOPICAL at 21:46

## 2023-05-24 RX ADMIN — Medication 500 MILLIGRAM(S): at 11:24

## 2023-05-24 RX ADMIN — AMIODARONE HYDROCHLORIDE 200 MILLIGRAM(S): 400 TABLET ORAL at 11:24

## 2023-05-24 RX ADMIN — Medication 2000 UNIT(S): at 11:24

## 2023-05-24 RX ADMIN — MEROPENEM 100 MILLIGRAM(S): 1 INJECTION INTRAVENOUS at 12:11

## 2023-05-24 RX ADMIN — Medication 100 MILLIGRAM(S): at 11:24

## 2023-05-24 RX ADMIN — NYSTATIN CREAM 1 APPLICATION(S): 100000 CREAM TOPICAL at 12:11

## 2023-05-24 RX ADMIN — QUETIAPINE FUMARATE 50 MILLIGRAM(S): 200 TABLET, FILM COATED ORAL at 21:46

## 2023-05-24 RX ADMIN — CHLORHEXIDINE GLUCONATE 15 MILLILITER(S): 213 SOLUTION TOPICAL at 11:23

## 2023-05-24 RX ADMIN — HEPARIN SODIUM 5000 UNIT(S): 5000 INJECTION INTRAVENOUS; SUBCUTANEOUS at 21:46

## 2023-05-24 RX ADMIN — PANTOPRAZOLE SODIUM 40 MILLIGRAM(S): 20 TABLET, DELAYED RELEASE ORAL at 11:23

## 2023-05-24 RX ADMIN — CEFEPIME 100 MILLIGRAM(S): 1 INJECTION, POWDER, FOR SOLUTION INTRAMUSCULAR; INTRAVENOUS at 21:46

## 2023-05-24 RX ADMIN — Medication 1 APPLICATION(S): at 16:57

## 2023-05-24 RX ADMIN — NYSTATIN CREAM 1 APPLICATION(S): 100000 CREAM TOPICAL at 21:46

## 2023-05-24 RX ADMIN — CLOPIDOGREL BISULFATE 75 MILLIGRAM(S): 75 TABLET, FILM COATED ORAL at 12:06

## 2023-05-24 RX ADMIN — Medication 5 MILLIGRAM(S): at 21:46

## 2023-05-24 NOTE — PROGRESS NOTE ADULT - ASSESSMENT
Assesment: 78yo Male seen for the following:  -Partial thickness wound to R hallux  -Partial thickness wound to R heel  -Diabetes Milletus type 2  -Difficulty with ambulation    Plan:   -Chart reviewed and Patient evaluated;  -Discussed diagnosis and treatment with patient.  -Xrays of R foot reviewed: on wet read showing no acute cortical demineralization, noted subtle radiolucency by subcutaneous tissue of posterior heel.   -Wound flush with normal saline. Official impression noted above.   -Applied betadine to Right hallux and xeroform to R heel with DSD  -Wound to Right hallux is superficial in nature with scant sanguinous discharge on evaluation and surrounding eschar tissue, no pus/purulence, no PTB, stable at this time.   Wound to Right heel is superficial in nature with pink wound bed, no fluctuance, no crepitus, no PTB, stable at this time.   -WC: idosorb and DSD  -Offloading of bilateral heels with CAIR boots or while bedbound to reduce further tissue damage   -5/18- on xray noted subtle air by subcutaneous tissue at posterior heel. Re-assessed heel wound at bedside today evening. No fluctuance, no crepitus, no PTB. The subcutaneous air noted on xray is air from wound.   -Will continue to monitor wounds and continue local wound care at this time.   -Continue with IV antibiotics As Per ID  -All additional care per Med appreciated  -Patient tolerated interventions well without any complications.   -Podiatry will follow while in house.

## 2023-05-24 NOTE — PROGRESS NOTE ADULT - SUBJECTIVE AND OBJECTIVE BOX
Date of Service: 5/24/23  HPI: 78 yo male with hx of DM, HTN, HLD, who presented initially to an outside hospital in [place], [place] on 3/1/23 with chest pain and lightheadedness after doing yard work, found to have an inferiolateral ST elevations.  He was taken to the cath lab and underwent PCI with MEDINA placement to mLCX (Resolute [place] 4 x 26mm), dLAD (Resolute [place] 2.5 x 15mm and 2.25 x 12mm), and Impella CP was placed.   Patient was cannulated for peripheral VA ECMO on 3/2 and decannulated on 3/9.  Impella was removed 3/10.  Hospital course was complicated by multiple watershed strokes, acute respiratory failure requiring tracheostomy, BARBRA requiring renal replacement therapy, upper GI bleed, massive hemoptysis originating from lingula requiring bronchial blocker and bronchial artery embolization, pseudomonas bacteremia and septic shock.    Patient was eventually transported from [facility] on 5/16 to [facility] CCU with a tracheostomy, PEG, tunneled right sided HD catheter, right sided PICC line, rectal tube, and ostomy bag over his penis for urine collection.     (16 May 2023 16:49)    Podiatry consulted for R foot evaluation. Pt at bedside, non verbal, family present. Per family, they report wounds being present for approx 3-4wks. Family admits to noticing yellow drainage from R big toe. Pt no aware pt missing R big toe nail. No additional pedal complaints reported at this time.     5/24/23: Pt seen by Podiatry team  for follow up of R foot wounds. Pt awake and alert, family at bedside.     PMH: STEMI (ST elevation myocardial infarction)    Cardiogenic shock    Stroke    History of chronic respiratory failure      PSH:    Allergies:No Known Allergies      Labs:                                   7.3    10.46 )-----------( 202      ( 24 May 2023 08:23 )             24.0   05-24    147<H>  |  115<H>  |  34<H>  ----------------------------<  84  4.6   |  24  |  0.92    Ca    8.5      24 May 2023 06:08  Phos  3.9     05-24  Mg     2.0     05-24        MEDICATIONS  (STANDING):  aMIOdarone    Tablet 200 milliGRAM(s) Oral daily  ascorbic acid 500 milliGRAM(s) Oral daily  cadexomer iodine 0.9% Gel 1 Application(s) Topical daily  chlorhexidine 0.12% Liquid 15 milliLiter(s) Oral Mucosa every 12 hours  chlorhexidine 4% Liquid 1 Application(s) Topical <User Schedule>  cholecalciferol 2000 Unit(s) Oral daily  clopidogrel Tablet 75 milliGRAM(s) Enteral Tube daily  collagenase Ointment 1 Application(s) Topical daily  heparin   Injectable 5000 Unit(s) SubCutaneous every 12 hours  melatonin 5 milliGRAM(s) Oral at bedtime  meropenem  IVPB 1000 milliGRAM(s) IV Intermittent every 12 hours  norepinephrine Infusion 0.05 MICROgram(s)/kG/Min (4.99 mL/Hr) IV Continuous <Continuous>  nystatin Powder 1 Application(s) Topical two times a day  pantoprazole  Injectable 40 milliGRAM(s) IV Push daily  QUEtiapine 50 milliGRAM(s) Oral at bedtime  thiamine 100 milliGRAM(s) Oral daily    MEDICATIONS  (PRN):  ALPRAZolam 0.25 milliGRAM(s) Oral every 8 hours PRN agitation  HYDROmorphone  Injectable 1 milliGRAM(s) IV Push every 4 hours PRN Severe Pain (7 - 10)       Vital Signs Last 24 Hrs  T(C): 36.2 (24 May 2023 04:20), Max: 37.7 (23 May 2023 12:00)  T(F): 97.2 (24 May 2023 04:20), Max: 99.9 (23 May 2023 12:00)  HR: 111 (24 May 2023 09:00) (57 - 111)  BP: 128/61 (24 May 2023 09:00) (99/49 - 135/75)  BP(mean): 76 (24 May 2023 09:00) (59 - 89)  RR: 23 (24 May 2023 09:00) (18 - 28)  SpO2: 100% (24 May 2023 09:00) (98% - 100%)    Parameters below as of 23 May 2023 21:00  Patient On (Oxygen Delivery Method): ventilator    O2 Concentration (%): 40      Physical Exam:   Constitutional: NAD, alert;  Derm:  Skin warm, dry and supple bilateral.    Wound to nail bed of dorsal Left hallux with absent nail, superficial in nature, no hyperkeratotic border, wound base fibrogranular, wound size (approx 2.8 cm X 2cm), no edema, no purulence, no fluctuance, no tracking/tunneling, no probe to bone, mild sanguinous discharge distal to eponychium, eschar noted to medial distal hallux and at eponychium.   Pressure stage 2 wound to Right heel, no PTB, no crepitus, no fluctuance, superficial in nature with pink wound bed.   Vascular: Dorsalis Pedis and Posterior Tibial pulses non palpable.  Capillary refill delayed bilateral.    Neuro: Unable to access due to pt condition  MSK: Unable to access due to pt condition        < from: Xray Foot AP + Lateral + Oblique, Right (05.17.23 @ 15:36) >  INTERPRETATION:  RIGHT foot    CLINICAL INFORMATION: Hallux and heel wound TECHNIQUE: AP,lateral and   oblique views.    FINDINGS: Heel soft tissue subcutaneous air adjacent to intact calcaneal   tuberosity.  RIGHT hallux are radiographically intact.  Second and third hammertoe deformities.  Remaining osseous and joint structures of the RIGHT foot are   radiographicallyintact.  The bones and joint spaces are radiographically intact.  No fracture or dislocation.  Soft tissues unremarkable.    IMPRESSION:    Heel subcutaneous air without radiographic evidence of calcaneal   tuberosity osteolysis/osteomyelitis.  Firsthallux radiographically intact..  If osteomyelitis is clinically considered  despite conservative therapy,   and soft tissue / bone infection requires further assessment, follow-up   MRI recommended.    --- End of Report ---    < end of copied text >

## 2023-05-24 NOTE — PROGRESS NOTE ADULT - SUBJECTIVE AND OBJECTIVE BOX
Patient is a 77y Male whose bp is stable.  .    MEDICATIONS  (STANDING):  aMIOdarone    Tablet 200 milliGRAM(s) Oral daily  ascorbic acid 500 milliGRAM(s) Oral daily  cadexomer iodine 0.9% Gel 1 Application(s) Topical daily  cefepime   IVPB 2000 milliGRAM(s) IV Intermittent every 12 hours  chlorhexidine 0.12% Liquid 15 milliLiter(s) Oral Mucosa every 12 hours  chlorhexidine 4% Liquid 1 Application(s) Topical <User Schedule>  cholecalciferol 2000 Unit(s) Oral daily  clopidogrel Tablet 75 milliGRAM(s) Enteral Tube daily  collagenase Ointment 1 Application(s) Topical daily  heparin   Injectable 5000 Unit(s) SubCutaneous every 12 hours  melatonin 5 milliGRAM(s) Oral at bedtime  norepinephrine Infusion 0.05 MICROgram(s)/kG/Min (4.99 mL/Hr) IV Continuous <Continuous>  nystatin Powder 1 Application(s) Topical two times a day  pantoprazole  Injectable 40 milliGRAM(s) IV Push daily  QUEtiapine 50 milliGRAM(s) Oral at bedtime  thiamine 100 milliGRAM(s) Oral daily    MEDICATIONS  (PRN):  ALPRAZolam 0.25 milliGRAM(s) Oral every 8 hours PRN agitation  HYDROmorphone  Injectable 1 milliGRAM(s) IV Push every 4 hours PRN Severe Pain (7 - 10)        T(C): , Max: 38.5 (05-24-23 @ 10:00)  T(F): , Max: 101.3 (05-24-23 @ 10:00)  HR: 110 (05-24-23 @ 22:00)  BP: 103/51 (05-24-23 @ 22:00)  BP(mean): 64 (05-24-23 @ 22:00)  RR: 23 (05-24-23 @ 22:00)  SpO2: 95% (05-24-23 @ 22:00)  Wt(kg): --    05-23 @ 07:01  -  05-24 @ 07:00  --------------------------------------------------------  IN: 2060 mL / OUT: 950 mL / NET: 1110 mL    05-24 @ 07:01  -  05-24 @ 23:05  --------------------------------------------------------  IN: 1600 mL / OUT: 635 mL / NET: 965 mL          PHYSICAL EXAM:    Constitutional: frail, ill appearing  HEENT: poor dentition  Neck: No LAD, No JVD  tanyatr jose snider  dist        LABS:                        7.3    10.46 )-----------( 202      ( 24 May 2023 08:23 )             24.0     24 May 2023 06:08    147    |  115    |  34     ----------------------------<  84     4.6     |  24     |  0.92   23 May 2023 16:35    142    |  112    |  44     ----------------------------<  139    5.1     |  24     |  1.14   23 May 2023 11:06    142    |  113    |  43     ----------------------------<  125    6.0     |  24     |  1.26   23 May 2023 05:23    144    |  115    |  43     ----------------------------<  125    6.0     |  25     |  1.33   23 May 2023 01:58    141    |  112    |  42     ----------------------------<  195    6.9     |  22     |  1.47     Ca    8.5        24 May 2023 06:08  Ca    8.4        23 May 2023 16:35  Ca    8.4        23 May 2023 11:06  Ca    8.7        23 May 2023 05:23  Ca    8.7        23 May 2023 01:58  Phos  3.9       24 May 2023 06:08  Phos  5.3       23 May 2023 05:23  Phos  6.2       23 May 2023 01:58  Mg     2.0       24 May 2023 06:08  Mg     2.1       23 May 2023 05:23  Mg     2.1       23 May 2023 01:58  Mg     2.0       21 May 2023 05:49            Urine Studies:          RADIOLOGY & ADDITIONAL STUDIES:

## 2023-05-24 NOTE — PROGRESS NOTE ADULT - ASSESSMENT
78 yo male with hx of DM, HTN, HLD, who presented initially to an outside hospital in [place], [place] on 3/1/23 with chest pain and lightheadedness after doing yard work, found to have an inferiolateral ST elevations.  He was taken to the cath lab and underwent PCI with MEDINA placement to mLCX (Resolute [place] 4 x 26mm), dLAD (Resolute [place] 2.5 x 15mm and 2.25 x 12mm), and Impella CP was placed.  Patient was cannulated for peripheral VA ECMO on 3/2 and decannulated on 3/9.  Impella was removed 3/10.  Hospital course was complicated by multiple watershed strokes, acute respiratory failure requiring tracheostomy, BARBRA requiring renal replacement therapy, upper GI bleed, massive hemoptysis originating from lingula requiring bronchial blocker and bronchial artery embolization, pseudomonas bacteremia and septic shock.  Patient was eventually transported from [facility] on 5/16 to [facility] CCU with a tracheostomy, PEG, tunneled right sided HD catheter, right sided PICC line, rectal tube, and ostomy bag over his penis for urine collection.       1. Septic shock. Acute on chronic respiratory failure. Bilateral pleural effusions. Complicated UTI with Enterobacter/Ecoli. Scrotal infection/cellulitis. R heel/R hallux wounds.    - on pressors/ventilatory support   - plan for drainage of pleural effusions f/u pleural studies cx  - urine cx growing Enterobacter/Ecoli sensitivities reviewed   - s/p rocephin 5/16-5/18   - s/p IV cefepime [id number] #4  - abx broadened to meropenem [id number] on 5/23 descalate abx therapy to cefepime [id number]  - repeat blood cx 5/23 no growth  - xray noted, prob congestion over pna, sputum cx growing psae/enterobacter in sputum could be colonization - cefepime will cover  - urology eval noted, CT abd/pelvis reviewed 5/19  - continue with antibiotic coverage  - podiatry eval noted  - blood cx no growth   - tolerating abx well so far; no side effects noted  - reason for abx use and side effects reviewed with patient  - supportive care    2. other issues - care per medicine

## 2023-05-24 NOTE — CHART NOTE - NSCHARTNOTEFT_GEN_A_CORE
Clinical Nutrition Follow Up Note:    * 76 y/o male with hx of DM, HTN, HLD, who presented initially to an outside hospital in [place], [place] on 3/1/23 with chest pain and lightheadedness after doing yard work, found to have an inferolateral ST elevations. He was taken to the cath lab and underwent PCI with MEDINA placement to mLCX (Resolute [place] 4 x 26mm), dLAD (Resolute [place] 2.5 x 15mm and 2.25 x 12mm), and Impella CP was placed. Patient was cannulated for peripheral VA ECMO on 3/2 and decannulated on 3/9. Impella was removed 3/10.  Hospital course was complicated by multiple watershed strokes, acute respiratory failure requiring tracheostomy, BARBRA requiring renal replacement therapy, upper GI bleed, massive hemoptysis originating from lingula requiring bronchial blocker and bronchial artery embolization, pseudomonas bacteremia and septic shock. Patient was eventually transported from [facility] on 5/16 to [facility] CCU with a tracheostomy, PEG, tunneled right sided HD catheter, right sided PICC line, rectal tube, and ostomy bag over his penis for urine collection.       *current status: SLP Eval (5/20): Pureed with Moderately Thickened Liquids.  S/p reintubation (5/21) back on vent d/t pleural effusions. Pt with hyperkalemia receiving Kayexalate and Lokelma, pt discussed in IDR this morning - TF has been held 2/2 hyperkalemia, plan to switch TF to Nepro. Please see recommendations below.      *Labs Reviewed:  05-24    147<H>  |  115<H>  |  34<H>  ----------------------------<  84  4.6   |  24  |  0.92    Ca    8.5      24 May 2023 06:08  Phos  3.9     05-24  Mg     2.0     05-24    BMI: BMI (kg/m2): 19.5 (05-17-23 @ 20:00)  HbA1c:   Glucose: POCT Blood Glucose.: 152 mg/dL (05-23-23 @ 03:08)    BP: 132/73 (05-24-23 @ 10:00) (73/31 - 152/75)  Lipid Panel:     *Pertinent Meds: Vit C, Vit D3, Humulin R (20 units), Abx, Protonix, Seroquel, Sodium Bicarb, Kayexalate, Lokelma, Thiamine, Dilaudid, Oxycodone, Levophed, IVF    *I and O's:    05-23-23 @ 07:01  -  05-24-23 @ 07:00  --------------------------------------------------------  IN:    IV PiggyBack: 50 mL    Norepinephrine: 210 mL    sodium chloride 0.9%: 1800 mL  Total IN: 2060 mL    OUT:    Indwelling Catheter - Urethral (mL): 750 mL    Rectal Tube (mL): 200 mL  Total OUT: 950 mL    Total NET: 1110 mL    *(+) BM on 5/23 - large, loose, brown x2 ; pt not on bowel regimen.    *rohan score of  12: PUs documented - Coccyx (stage 2), L Trocanter (stage 2), R Heel (stage 2). +1 generalized edema documented.    *TF intake, meeting ~ 0% of estimated nutr needs.    *Malnutrition dx: Pt continues to meet criteria for severe protein-calorie malnutrition in context of acute on chronic disease r/t decreased ability to meet increased nutrient needs 2/2 STEMI/ chronic respiratory failure AEB severe muscle/fat wasting, moderate edema    Estimated Needs: Based on 53 Kg   Calories: 4806-3339 Kcal (30-35 Kcal/Kg)  Protein:  g (1.5-2 g/Kg)  Fluids: 1409-7842 mL (25-30 mL/Kg)    *Wt Hx:    Height (cm): 165.1 (05-17-23 @ 20:00)  Weight (kg): 53.2 (05-16-23 @ 16:35)  BMI (kg/m2): 19.5 (05-17-23 @ 20:00)  BSA (m2): 1.58 (05-17-23 @ 20:00)    Recommendations:  1) Change TF regimen to Nepro with a goal rate of 50cc/hr with 1 packet of Prosource (provides 1840kcals, 92g pro, and 727cc free water)  2) Obtain vitamin D 25OH level to assess nutriture   3) monitor hydration status; adjust free water flushes prn, consider free water flushes of 25mL/hr (which provides ~ 500mL of water per day)  4) monitor TF tolerance; keep back of bed > 35 degrees  5) monitor BM: if > 3 days without BM, add bowel regimen prn  6) daily wt checks to track/trend changes  7) C/w Thiamine, Vit C, and Vit D3  8) MVI w/ minerals daily to ensure 100% RDA met   9) consider checking B6, B12, thiamine, folate, carnitine, and copper levels as malnutrition in cause these to be deficient   *will continue to monitor and follow up with adjustments to treatment plan prn*    Krissy Suarez MS, RDN, [phone]

## 2023-05-24 NOTE — PROGRESS NOTE ADULT - SUBJECTIVE AND OBJECTIVE BOX
HPI:  76 y/o male with PMH HTN, HLD, DM2, recent prolonged hospital course in [place], [place] after a IWMI (underwent PCI with MEDINA to LCx) complicated by cardiogenic shock requiring ECMO, acute hypoxic resp failure requiring trach, dysphagia requiring PEG, BARBRA requiring dialysis, pA.fib, acute CVA, septic shock from Pseudomonas bacteremia, hemoptysis, GI bleed   Now transferred to  for further care.  off pressors, bp ok,   On trach collar.    Dialysis catheter removed was removed  on cefepime for pseudomonas in sputum and scrotum      5/22: No events over night.  Tolerating TC well  5/23: Events noted over night, back on the event, on pressors, CT Chest showing Large B/L Effusions, Hyperkalemic  5/24: UO and Hyperkalemia improved.  Still on Vent and pressors       PAST MEDICAL & SURGICAL HISTORY:  STEMI (ST elevation myocardial infarction)      Cardiogenic shock      Stroke      History of chronic respiratory failure          FAMILY HISTORY:      Social Hx:    Allergies    No Known Allergies    Intolerances            ICU Vital Signs Last 24 Hrs  T(C): 36.2 (24 May 2023 04:20), Max: 37.7 (23 May 2023 12:00)  T(F): 97.2 (24 May 2023 04:20), Max: 99.9 (23 May 2023 12:00)  HR: 111 (24 May 2023 09:00) (57 - 111)  BP: 128/61 (24 May 2023 09:00) (99/49 - 135/75)  BP(mean): 76 (24 May 2023 09:00) (59 - 89)  ABP: --  ABP(mean): --  RR: 23 (24 May 2023 09:00) (18 - 28)  SpO2: 100% (24 May 2023 09:00) (98% - 100%)    O2 Parameters below as of 23 May 2023 21:00  Patient On (Oxygen Delivery Method): ventilator    O2 Concentration (%): 40        Mode: AC/ CMV (Assist Control/ Continuous Mandatory Ventilation)  RR (machine): 18  TV (machine): 400  FiO2: 40  PEEP: 5  ITime: 0.8  PIP: 26      I&O's Summary    23 May 2023 07:01  -  24 May 2023 07:00  --------------------------------------------------------  IN: 2060 mL / OUT: 950 mL / NET: 1110 mL                              7.3    10.46 )-----------( 202      ( 24 May 2023 08:23 )             24.0       05-24    147<H>  |  115<H>  |  34<H>  ----------------------------<  84  4.6   |  24  |  0.92    Ca    8.5      24 May 2023 06:08  Phos  3.9     05-24  Mg     2.0     05-24        CARDIAC MARKERS ( 23 May 2023 01:58 )  x     / x     / 35 U/L / x     / x            ABG - ( 23 May 2023 03:03 )  pH, Arterial: 7.35  pH, Blood: x     /  pCO2: 36    /  pO2: 258   / HCO3: 20    / Base Excess: -5.1  /  SaO2: 99                      MEDICATIONS  (STANDING):  aMIOdarone    Tablet 200 milliGRAM(s) Oral daily  ascorbic acid 500 milliGRAM(s) Oral daily  cadexomer iodine 0.9% Gel 1 Application(s) Topical daily  chlorhexidine 0.12% Liquid 15 milliLiter(s) Oral Mucosa every 12 hours  chlorhexidine 4% Liquid 1 Application(s) Topical <User Schedule>  cholecalciferol 2000 Unit(s) Oral daily  clopidogrel Tablet 75 milliGRAM(s) Enteral Tube daily  collagenase Ointment 1 Application(s) Topical daily  heparin   Injectable 5000 Unit(s) SubCutaneous every 12 hours  melatonin 5 milliGRAM(s) Oral at bedtime  meropenem  IVPB 1000 milliGRAM(s) IV Intermittent every 12 hours  norepinephrine Infusion 0.05 MICROgram(s)/kG/Min (4.99 mL/Hr) IV Continuous <Continuous>  nystatin Powder 1 Application(s) Topical two times a day  pantoprazole  Injectable 40 milliGRAM(s) IV Push daily  QUEtiapine 50 milliGRAM(s) Oral at bedtime  thiamine 100 milliGRAM(s) Oral daily    MEDICATIONS  (PRN):  ALPRAZolam 0.25 milliGRAM(s) Oral every 8 hours PRN agitation  HYDROmorphone  Injectable 1 milliGRAM(s) IV Push every 4 hours PRN Severe Pain (7 - 10)      DVT Prophylaxis:    Advanced Directives:  Discussed with:    Visit Information: 30 min    ** Time is exclusive of billed procedures and/or teaching and/or routine family updates.

## 2023-05-24 NOTE — PROGRESS NOTE ADULT - ASSESSMENT
76 yo male with hx of DM, HTN, HLD, s/p STEMI complicated by cardiogenic shock, BARBRA requiring dialysis, acute respiratory failure requiring trach/peg.  Transported from [place] to [place] at family request.  Found to have scrotal wounds/ pressure ulcers.  Growing pseudomonas and klebsiella in sputum,  nothing in blood.     1. s/p MI with cardiogentic shock, last echo ef 65%,   2. renal failure  3. GI tolerating tube feeds/s/p PEG  4. scrotal infection  5. chronic respiratory failure     Plan  CCU    PULM: Patient back on the vent over night.  large B/L Effusions.  Right Pig Tail Today    Cardio: Hypotensive on Levo.  Likely from Septic Shock, Continue Amio    Renal: Recurrent BARBRA with Hyperkalemia. Repeat BMP PND    GI: Continue Feeds, PPI    ID: [place].  SC sent, BC Sent    SQH DVT Prophylaxis    D/W the Patients Sons

## 2023-05-24 NOTE — PROGRESS NOTE ADULT - ASSESSMENT
80 yo male with extended complicated stay at [facility] , resides in [place] ,  HTN, DM, w 3/1/23 - STEMI required cath PCI to  and and dLAD required Impellar and ECMO was in BARBRA required CRRT w last HD 5/10 now reconsult for decreased UOP and hyperkalemia setting of hypotension.    BARBRA likey ATN related on HD -  last HD 5/10 reported   -Decrement UOP, rising K due to hypotension/hemodynamics on 5/23    Maintain MAP    Treat K if rise again    Low K feeds if an issue    Hypernatremia   free water flushes for normalization    d/c with staff, Dr Aguero

## 2023-05-25 LAB
ALBUMIN FLD-MCNC: 1.3 G/DL — SIGNIFICANT CHANGE UP
ANION GAP SERPL CALC-SCNC: 7 MMOL/L — SIGNIFICANT CHANGE UP (ref 5–17)
B PERT IGG+IGM PNL SER: ABNORMAL
B PERT IGG+IGM PNL SER: CLEAR — SIGNIFICANT CHANGE UP
BUN SERPL-MCNC: 33 MG/DL — HIGH (ref 7–23)
CALCIUM SERPL-MCNC: 8.7 MG/DL — SIGNIFICANT CHANGE UP (ref 8.5–10.1)
CHLORIDE SERPL-SCNC: 114 MMOL/L — HIGH (ref 96–108)
CO2 SERPL-SCNC: 27 MMOL/L — SIGNIFICANT CHANGE UP (ref 22–31)
COLOR FLD: YELLOW — SIGNIFICANT CHANGE UP
COLOR FLD: YELLOW — SIGNIFICANT CHANGE UP
CREAT SERPL-MCNC: 0.83 MG/DL — SIGNIFICANT CHANGE UP (ref 0.5–1.3)
EGFR: 91 ML/MIN/1.73M2 — SIGNIFICANT CHANGE UP
EOSINOPHIL # FLD: 0 % — SIGNIFICANT CHANGE UP
EOSINOPHIL # FLD: 0 % — SIGNIFICANT CHANGE UP
FLUID INTAKE SUBSTANCE CLASS: SIGNIFICANT CHANGE UP
FLUID INTAKE SUBSTANCE CLASS: SIGNIFICANT CHANGE UP
FOLATE+VIT B12 SERBLD-IMP: 0 % — SIGNIFICANT CHANGE UP
FOLATE+VIT B12 SERBLD-IMP: 0 % — SIGNIFICANT CHANGE UP
GLUCOSE FLD-MCNC: 162 MG/DL — SIGNIFICANT CHANGE UP
GLUCOSE SERPL-MCNC: 186 MG/DL — HIGH (ref 70–99)
GRAM STN FLD: SIGNIFICANT CHANGE UP
HCT VFR BLD CALC: 23.6 % — LOW (ref 39–50)
HGB BLD-MCNC: 7.2 G/DL — LOW (ref 13–17)
LDH SERPL L TO P-CCNC: 104 U/L — SIGNIFICANT CHANGE UP
LYMPHOCYTES # FLD: 50 % — SIGNIFICANT CHANGE UP
LYMPHOCYTES # FLD: 57 % — SIGNIFICANT CHANGE UP
MAGNESIUM SERPL-MCNC: 1.9 MG/DL — SIGNIFICANT CHANGE UP (ref 1.6–2.6)
MCHC RBC-ENTMCNC: 30.5 GM/DL — LOW (ref 32–36)
MCHC RBC-ENTMCNC: 31.3 PG — SIGNIFICANT CHANGE UP (ref 27–34)
MCV RBC AUTO: 102.6 FL — HIGH (ref 80–100)
MESOTHL CELL # FLD: 1 % — SIGNIFICANT CHANGE UP
MESOTHL CELL # FLD: 3 % — SIGNIFICANT CHANGE UP
MONOS+MACROS # FLD: 29 % — SIGNIFICANT CHANGE UP
MONOS+MACROS # FLD: 45 % — SIGNIFICANT CHANGE UP
NEUTROPHILS-BODY FLUID: 3 % — SIGNIFICANT CHANGE UP
NEUTROPHILS-BODY FLUID: 9 % — SIGNIFICANT CHANGE UP
NRBC # FLD: 0 % — SIGNIFICANT CHANGE UP
NRBC # FLD: 0 % — SIGNIFICANT CHANGE UP
OTHER CELLS FLD MANUAL: 1 % — SIGNIFICANT CHANGE UP
OTHER CELLS FLD MANUAL: 2 % — SIGNIFICANT CHANGE UP
PH FLD: 7.6 — SIGNIFICANT CHANGE UP
PH FLD: 7.8 — SIGNIFICANT CHANGE UP
PHOSPHATE SERPL-MCNC: 3 MG/DL — SIGNIFICANT CHANGE UP (ref 2.5–4.5)
PLATELET # BLD AUTO: 217 K/UL — SIGNIFICANT CHANGE UP (ref 150–400)
POTASSIUM SERPL-MCNC: 3.6 MMOL/L — SIGNIFICANT CHANGE UP (ref 3.5–5.3)
POTASSIUM SERPL-SCNC: 3.6 MMOL/L — SIGNIFICANT CHANGE UP (ref 3.5–5.3)
PROT FLD-MCNC: 2.2 G/DL — SIGNIFICANT CHANGE UP
RBC # BLD: 2.3 M/UL — LOW (ref 4.2–5.8)
RBC # FLD: 16.8 % — HIGH (ref 10.3–14.5)
RCV VOL RI: 7000 /UL — HIGH (ref 0–0)
RCV VOL RI: < 2000 /UL — SIGNIFICANT CHANGE UP (ref 0–0)
SODIUM SERPL-SCNC: 148 MMOL/L — HIGH (ref 135–145)
SPECIMEN SOURCE: SIGNIFICANT CHANGE UP
TOTAL NUCLEATED CELL COUNT, BODY FLUID: 125 /UL — SIGNIFICANT CHANGE UP
TOTAL NUCLEATED CELL COUNT, BODY FLUID: 427 /UL — SIGNIFICANT CHANGE UP
TUBE TYPE: SIGNIFICANT CHANGE UP
TUBE TYPE: SIGNIFICANT CHANGE UP
WBC # BLD: 8.6 K/UL — SIGNIFICANT CHANGE UP (ref 3.8–10.5)
WBC # FLD AUTO: 8.6 K/UL — SIGNIFICANT CHANGE UP (ref 3.8–10.5)

## 2023-05-25 PROCEDURE — 99291 CRITICAL CARE FIRST HOUR: CPT

## 2023-05-25 PROCEDURE — 71045 X-RAY EXAM CHEST 1 VIEW: CPT | Mod: 26

## 2023-05-25 RX ORDER — POTASSIUM CHLORIDE 20 MEQ
40 PACKET (EA) ORAL ONCE
Refills: 0 | Status: COMPLETED | OUTPATIENT
Start: 2023-05-25 | End: 2023-05-25

## 2023-05-25 RX ORDER — ALBUMIN HUMAN 25 %
100 VIAL (ML) INTRAVENOUS EVERY 4 HOURS
Refills: 0 | Status: COMPLETED | OUTPATIENT
Start: 2023-05-25 | End: 2023-05-25

## 2023-05-25 RX ADMIN — AMIODARONE HYDROCHLORIDE 200 MILLIGRAM(S): 400 TABLET ORAL at 14:04

## 2023-05-25 RX ADMIN — CEFEPIME 100 MILLIGRAM(S): 1 INJECTION, POWDER, FOR SOLUTION INTRAMUSCULAR; INTRAVENOUS at 14:11

## 2023-05-25 RX ADMIN — Medication 1 APPLICATION(S): at 17:33

## 2023-05-25 RX ADMIN — CLOPIDOGREL BISULFATE 75 MILLIGRAM(S): 75 TABLET, FILM COATED ORAL at 14:03

## 2023-05-25 RX ADMIN — Medication 2000 UNIT(S): at 14:03

## 2023-05-25 RX ADMIN — CHLORHEXIDINE GLUCONATE 15 MILLILITER(S): 213 SOLUTION TOPICAL at 17:33

## 2023-05-25 RX ADMIN — HYDROMORPHONE HYDROCHLORIDE 1 MILLIGRAM(S): 2 INJECTION INTRAMUSCULAR; INTRAVENOUS; SUBCUTANEOUS at 12:40

## 2023-05-25 RX ADMIN — CEFEPIME 100 MILLIGRAM(S): 1 INJECTION, POWDER, FOR SOLUTION INTRAMUSCULAR; INTRAVENOUS at 21:43

## 2023-05-25 RX ADMIN — Medication 100 MILLIGRAM(S): at 14:04

## 2023-05-25 RX ADMIN — Medication 40 MILLIEQUIVALENT(S): at 14:11

## 2023-05-25 RX ADMIN — Medication 500 MILLIGRAM(S): at 14:03

## 2023-05-25 RX ADMIN — HYDROMORPHONE HYDROCHLORIDE 1 MILLIGRAM(S): 2 INJECTION INTRAMUSCULAR; INTRAVENOUS; SUBCUTANEOUS at 23:45

## 2023-05-25 RX ADMIN — Medication 5 MILLIGRAM(S): at 21:44

## 2023-05-25 RX ADMIN — CHLORHEXIDINE GLUCONATE 15 MILLILITER(S): 213 SOLUTION TOPICAL at 21:43

## 2023-05-25 RX ADMIN — HEPARIN SODIUM 5000 UNIT(S): 5000 INJECTION INTRAVENOUS; SUBCUTANEOUS at 14:04

## 2023-05-25 RX ADMIN — QUETIAPINE FUMARATE 50 MILLIGRAM(S): 200 TABLET, FILM COATED ORAL at 21:44

## 2023-05-25 RX ADMIN — PANTOPRAZOLE SODIUM 40 MILLIGRAM(S): 20 TABLET, DELAYED RELEASE ORAL at 14:05

## 2023-05-25 RX ADMIN — HEPARIN SODIUM 5000 UNIT(S): 5000 INJECTION INTRAVENOUS; SUBCUTANEOUS at 21:43

## 2023-05-25 RX ADMIN — NYSTATIN CREAM 1 APPLICATION(S): 100000 CREAM TOPICAL at 23:00

## 2023-05-25 NOTE — PROCEDURE NOTE - NSINDICATIONS_GEN_A_CORE
Detail Level: Generalized
General Sunscreen Counseling: Sun protect with sunscreen and clothing. Remember to reapply sunscreen.
airway protection
pleural effusion
pleural effusion

## 2023-05-25 NOTE — PROGRESS NOTE ADULT - ASSESSMENT
Assessment: 78yo Male seen for the following:  -Partial thickness wound to R hallux  -Partial thickness wound to R heel  -Diabetes Mellitus type 2  -Difficulty with ambulation    Plan:   -Chart reviewed and Patient evaluated;  -Discussed diagnosis and treatment with patient.  -Xrays of R foot reviewed: on wet read showing no acute cortical demineralization, noted subtle radiolucency by subcutaneous tissue of posterior heel.   -Wound flush with normal saline. Official impression noted above.   -Applied betadine to Right hallux and xeroform to R heel with DSD  -Wound to Right hallux is superficial in nature with scant sanguinous discharge on evaluation and surrounding eschar tissue, no pus/purulence, no PTB, stable at this time.   Wound to Right heel is superficial in nature with pink wound bed, no fluctuance, no crepitus, no PTB, stable at this time.   -WC: idosorb and DSD  -Offloading of bilateral heels with CAIR boots or while bedbound to reduce further tissue damage   -5/18- on xray noted subtle air by subcutaneous tissue at posterior heel. Re-assessed heel wound at bedside today evening. No fluctuance, no crepitus, no PTB. The subcutaneous air noted on xray is air from wound.   -Will continue to monitor wounds and continue local wound care at this time.   -Continue with IV antibiotics As Per ID  -All additional care per Med appreciated  -Patient tolerated interventions well without any complications.   -Podiatry will follow while in house.

## 2023-05-25 NOTE — CHART NOTE - NSCHARTNOTEFT_GEN_A_CORE
CCU Event Progress Note    Pt became hypotensive post chest tube placement, 1300cc output    HPI:    S:    Pt seen and examined  HD # 10  DNR  PMHx Dm2, HTN, HLD transferred from [facility] after protracted hospital course for STEMI s/p MEDINA to mLCX and dLAD requiring impella complicated by cardiogenic shock requiring VA ECMO, respiratory failure requiring trach, BARBRA requiring HD, UGIB, massive hemoptysis originating from lingula s/p bronchial blocker and bronchial artery emobolization, pseudomonas bacteremia and septic shock.  Ultimately improved, minimal vent settings, no longer requiring HD, HD stable and was air transported to  with tentative plan for facility transfer.      5/25: s/p drainage Lt chest for large pleural effusion yesterday; drain Lt side today. Remains on vent.     ROS: Unable to verbalize (on vent)      Allergies    No Known Allergies    Intolerances    MEDICATIONS  (STANDING):    aMIOdarone    Tablet 200 milliGRAM(s) Oral daily  ascorbic acid 500 milliGRAM(s) Oral daily  cadexomer iodine 0.9% Gel 1 Application(s) Topical daily  cefepime   IVPB 2000 milliGRAM(s) IV Intermittent every 12 hours  chlorhexidine 0.12% Liquid 15 milliLiter(s) Oral Mucosa every 12 hours  chlorhexidine 4% Liquid 1 Application(s) Topical <User Schedule>  cholecalciferol 2000 Unit(s) Oral daily  clopidogrel Tablet 75 milliGRAM(s) Enteral Tube daily  collagenase Ointment 1 Application(s) Topical daily  heparin   Injectable 5000 Unit(s) SubCutaneous every 12 hours  melatonin 5 milliGRAM(s) Oral at bedtime  norepinephrine Infusion 0.05 MICROgram(s)/kG/Min (4.99 mL/Hr) IV Continuous <Continuous>  nystatin Powder 1 Application(s) Topical two times a day  pantoprazole  Injectable 40 milliGRAM(s) IV Push daily  QUEtiapine 50 milliGRAM(s) Oral at bedtime  thiamine 100 milliGRAM(s) Oral daily    MEDICATIONS  (PRN):    ALPRAZolam 0.25 milliGRAM(s) Oral every 8 hours PRN agitation    Drug Dosing Weight    Height (cm): 165.1 (17 May 2023 20:00)  Weight (kg): 53.2 (16 May 2023 16:35)  BMI (kg/m2): 19.5 (17 May 2023 20:00)  BSA (m2): 1.58 (17 May 2023 20:00)    PAST MEDICAL & SURGICAL HISTORY:    STEMI (ST elevation myocardial infarction)  Cardiogenic shock  Stroke  History of chronic respiratory failure    FAMILY HISTORY:    ROS: See HPI; otherwise, all systems reviewed and negative.    O:    ICU Vital Signs Last 24 Hrs  T(C): 37.3 (26 May 2023 05:00), Max: 37.7 (26 May 2023 00:00)  T(F): 99.2 (26 May 2023 05:00), Max: 99.8 (26 May 2023 00:00)  HR: 106 (26 May 2023 05:36) (72 - 110)  BP: 110/55 (26 May 2023 05:00) (78/38 - 162/77)  BP(mean): 68 (26 May 2023 05:00) (46 - 99)  ABP: --  ABP(mean): --  RR: 25 (26 May 2023 05:00) (15 - 26)  SpO2: 100% (26 May 2023 05:36) (97% - 100%)    O2 Parameters below as of 26 May 2023 03:00  Patient On (Oxygen Delivery Method): ventilator    O2 Concentration (%): 30    I&O's Detail    25 May 2023 07:01  -  26 May 2023 07:00  --------------------------------------------------------  IN:    Enteral Tube Flush: 700 mL    IV PiggyBack: 150 mL    Nepro with Carb Steady: 550 mL    Norepinephrine: 215.9 mL    Vital1.5: 500 mL  Total IN: 2115.9 mL    OUT:    Chest Tube (mL): 1810 mL    Chest Tube (mL): 1870 mL    Indwelling Catheter - Urethral (mL): 900 mL    Rectal Tube (mL): 100 mL  Total OUT: 4680 mL    Total NET: -2564.1 mL    Mode: AC/ CMV (Assist Control/ Continuous Mandatory Ventilation)  RR (machine): 18  TV (machine): 400  FiO2: 30  PEEP: 5  ITime: 0.9  MAP: 13  PIP: 27    PE:    Adult M lying in bed  Appears emaciated and chronically ill  No JVD + trach in place, balloon up, on vent  CTA B/L  S1S2+  Abd soft NTND  1+ edema B/L LE  Awake, sluggish and weak but non focal  Skin pink, warm    LABS:    CBC Full  -  ( 26 May 2023 06:49 )  WBC Count : 10.86 K/uL  RBC Count : 2.56 M/uL  Hemoglobin : 8.0 g/dL  Hematocrit : 26.5 %  Platelet Count - Automated : 247 K/uL  Mean Cell Volume : 103.5 fl  Mean Cell Hemoglobin : 31.3 pg  Mean Cell Hemoglobin Concentration : 30.2 gm/dL  Auto Neutrophil # : x  Auto Lymphocyte # : x  Auto Monocyte # : x  Auto Eosinophil # : x  Auto Basophil # : x  Auto Neutrophil % : x  Auto Lymphocyte % : x  Auto Monocyte % : x  Auto Eosinophil % : x  Auto Basophil % : x    05-25    148<H>  |  114<H>  |  33<H>  ----------------------------<  186<H>  3.6   |  27  |  0.83    Ca    8.7      25 May 2023 05:54  Phos  3.0     05-25  Mg     1.9     05-25          CAPILLARY BLOOD GLUCOSE                  A:    77M  HD # 10  DNR    Here for:    1. Shock, septic, hypovolemic 2/2  2. PNA  3. Acute + chronic resp failure  4. Dysphagia s/p PEG  5. Anemia  6. Scrotal cellulitis    P:    Pt critically ill with vasopressors being actively titrated for hemodynamic support, on ventilator being actively managed    s/p Lt chest drainage harper 14F pigtail; see procedure, CXR in place  Hypotensive; give albumin 50g x 3 runs for volume + oncotic effects  Levophed, actively titrating to defend MAP > 65; TTE results noted  Cefepime 2g q12h, s/p merrem  Per , on surgical intervention for scrotum  f/u Cx of pleural fluids, sputum, blood; all Cxs neg thus far  Imaging reviewed  No s/s active bleeding, give pRBC for Hgb < 7    Dispo: Cont critical care.    CCT 40 minutes

## 2023-05-25 NOTE — PROGRESS NOTE ADULT - SUBJECTIVE AND OBJECTIVE BOX
Date of service: 05-25-23 @ 13:24    pt seen and examined  fevers down  on ventilatory support  on low dose pressor  s/p R pigtail placement 1200 cc outpt    ROS: unable to obtain d/t medical condition      MEDICATIONS  (STANDING):  aMIOdarone    Tablet 200 milliGRAM(s) Oral daily  ascorbic acid 500 milliGRAM(s) Oral daily  cadexomer iodine 0.9% Gel 1 Application(s) Topical daily  cefepime   IVPB 2000 milliGRAM(s) IV Intermittent every 12 hours  chlorhexidine 0.12% Liquid 15 milliLiter(s) Oral Mucosa every 12 hours  chlorhexidine 4% Liquid 1 Application(s) Topical <User Schedule>  cholecalciferol 2000 Unit(s) Oral daily  clopidogrel Tablet 75 milliGRAM(s) Enteral Tube daily  collagenase Ointment 1 Application(s) Topical daily  heparin   Injectable 5000 Unit(s) SubCutaneous every 12 hours  melatonin 5 milliGRAM(s) Oral at bedtime  norepinephrine Infusion 0.05 MICROgram(s)/kG/Min (4.99 mL/Hr) IV Continuous <Continuous>  nystatin Powder 1 Application(s) Topical two times a day  pantoprazole  Injectable 40 milliGRAM(s) IV Push daily  potassium chloride   Powder 40 milliEquivalent(s) Enteral Tube once  QUEtiapine 50 milliGRAM(s) Oral at bedtime  thiamine 100 milliGRAM(s) Oral daily    Vital Signs Last 24 Hrs  T(C): 37.3 (25 May 2023 09:48), Max: 37.3 (25 May 2023 09:48)  T(F): 99.1 (25 May 2023 09:48), Max: 99.1 (25 May 2023 09:48)  HR: 72 (25 May 2023 13:00) (72 - 118)  BP: 78/38 (25 May 2023 13:00) (78/38 - 118/66)  BP(mean): 46 (25 May 2023 13:00) (46 - 78)  RR: 25 (25 May 2023 13:00) (14 - 25)  SpO2: 100% (25 May 2023 13:00) (95% - 100%)    Parameters below as of 24 May 2023 21:00  Patient On (Oxygen Delivery Method): ventilator    O2 Concentration (%): 30    PE:  Constitutional: frail looking  HEENT: NC/AT, EOMI, PERRLA, conjunctivae clear; ears and nose atraumatic; pharynx benign + trach   Neck: supple; thyroid not palpable  Back: no tenderness  Respiratory: decreased breath sounds   Cardiovascular: S1S2 regular, no murmurs  Abdomen: soft, not tender, not distended, positive BS; liver and spleen WNL  Genitourinary: no suprapubic tenderness Scrotum skin edema and erythema with superficial skin excoriation  Lymphatic: no LN palpable  Musculoskeletal: no muscle tenderness, no joint swelling or tenderness  Extremities: no pedal edema R heel/R hallux wound  Neurological/ Psychiatric: moving all extremities  Skin: no rashes; no palpable lesions    Labs: all available labs reviewed                          7.2    8.60  )-----------( 217      ( 25 May 2023 05:54 )             23.6     05-25    148<H>  |  114<H>  |  33<H>  ----------------------------<  186<H>  3.6   |  27  |  0.83    Ca    8.7      25 May 2023 05:54  Phos  3.0     05-25  Mg     1.9     05-25            Culture - Sputum (05.16.23 @ 23:14)   - Ceftazidime/Avibactam: S <=4  - Resistance Gene to Carbapenem: Nondet  - Ciprofloxacin: S <=0.25  - Ciprofloxacin: S <=0.25  - Ertapenem: S <=0.5  - Trimethoprim/Sulfamethoxazole: S <=0.5/9.5  Gram Stain:   Rare polymorphonuclear leukocytes per low power field   Few Squamous epithelial cells per low power field   Numerous Gram Negative Rods per oil power field  - Amikacin: S <=16  - Amikacin: S <=16  - Amoxicillin/Clavulanic Acid: R 16/8  - Ampicillin: R <=8 These ampicillin results predict results for amoxicillin  - Ampicillin/Sulbactam: R <=4/2 Enterobacter, Klebsiella aerogenes, Citrobacter, and Serratia may develop resistance during prolonged therapy (3-4 days)  - Aztreonam: S <=4  - Aztreonam: I 16  - Cefazolin: R >16 Enterobacter, Klebsiella aerogenes, Citrobacter, and Serratia may develop resistance during prolonged therapy (3-4 days)  - Cefepime: S <=2  - Cefepime: S 8  - Cefoxitin: R >16  - Ceftazidime: I 16  - Ceftriaxone: S <=1 Enterobacter, Klebsiella aerogenes, Citrobacter, and Serratia may develop resistance during prolonged therapy  - Gentamicin: S <=2  - Gentamicin: S 4  - Imipenem: S <=1  - Imipenem: R 4  - Levofloxacin: S 1  - Levofloxacin: S <=0.5  - Meropenem: S <=1  - Meropenem: S <=1  - Piperacillin/Tazobactam: S <=8  - Piperacillin/Tazobactam: I 64  - Tobramycin: S <=2  - Tobramycin: S <=2  Specimen Source: .Sputum Sputum  Culture Results:   Numerous Pseudomonas aeruginosa   Moderate Klebsiella aerogenes (Carbapenem Resistant)   Normal Respiratory Cristiane absent  Organism Identification: Pseudomonas aeruginosa   Klebsiella aerogenes (Carbapenem Resistant)   Klebsiella aerogenes (Carbapenem Resistant)  Specimen Source: Clean Catch Clean Catch (Midstream)  Culture Results:   >100,000 CFU/ml Escherichia coli   >100,000 CFU/ml Klebsiella aerogenes (Previously Enterobacter)  Culture - Blood (05.16.23 @ 16:54)   Specimen Source: .Blood None  Culture Results:   No growth to date.    Radiology: all available radiological tests reviewed  < from: CT Chest No Cont (05.23.23 @ 10:18) >  ACC: [id number] EXAM:  CT CHEST   ORDERED BY: HANNAH JACOME DATE:  05/23/2023          INTERPRETATION:  INDICATION: Respiratory failure    TECHNIQUE: Volumetric images of the chest without intravenous contrast.   Maximum intensity projection images were generated.    COMPARISON: No prior chest CT.    FINDINGS:    LUNGS/AIRWAYS/PLEURA: Tracheostomy cannula tip in the proximal trachea.   Distention of the trachea by the balloon cuff. Mucus within the left   lower lobar and superior segmental bronchi. Symmetric large pleural   effusions and secondary passive atelectasis of lower lobes and the left   upper lobe. Apparent groundglass in both upper lobes at least in part due   to low lung volumes. There may be a component of pulmonaryedema.    LYMPH NODES/MEDIASTINUM: Unremarkable.    HEART/VASCULATURE: Dilated left atrium. No pericardial effusion. Coronary   artery calcifications. Normal caliber aorta.    UPPER ABDOMEN: Left renal cyst. Excreted contrast in the gallbladder.   Diffuse gallbladder wall thickening.    BONES/SOFT TISSUES: Unremarkable.      IMPRESSION:    Large pleural effusions. Possible mild pulmonary edema in the upper   lobes, limited assessment due to low lung volumes.    Distended trachea by tracheostomy cannula balloon cuff. Recommend   adjustment.    Nondistended thick walled gallbladder, favored to be due to gallbladder   wall edema, such as in the setting of volume overload. Consider right   upper quadrant ultrasound or nuclear medicine study to assess for   cholecystitis, if clinically appropriate.    < from: Xray Chest 1 View- PORTABLE-Routine (05.16.23 @ 20:23) >    ACC: [id number] EXAM:  XR CHEST PORTABLE ROUTINE 1V   ORDERED BY: RADHA AVALOS     PROCEDURE DATE:  05/16/2023          INTERPRETATION:  TIME OF EXAM: May 16, 2023 at 8:10 PM.    CLINICAL INFORMATION: Chronic respiratory failure. Tracheostomy.    COMPARISON:  None available    TECHNIQUE:   AP Portable chest x-ray.    INTERPRETATION:    The heart is not enlarged. The thoracic aorta is calcified.  Tracheostomy tube is in place.  Right IJ catheter with tip in the right atrium.  Right upper extremity PICC line. Tip obscured by right IJ catheter. The   line extends at least to the SVC.  There are bilateral perihilar opacities, more extensive on the right.  There are bilateral small pleural effusions with likely associated   passive atelectasis.  No pneumothorax is seen.  No acute bony abnormality is noted.      IMPRESSION:  Lines as above.    Bilateral perihilar opacities, more extensive on the right, that could be   due to pulmonary edema or multifocal pneumonia.    Bilateral small pleural effusions with likely associated passive   atelectasis.    < end of copied text >  < from: Xray Foot AP + Lateral + Oblique, Right (05.17.23 @ 15:36) >    ACC: [id number] EXAM:  XR FOOT COMP MIN 3 VIEWS RT   ORDERED BY: AMAN BAEZA     PROCEDURE DATE:  05/17/2023          INTERPRETATION:  RIGHT foot    CLINICAL INFORMATION: Hallux and heel wound TECHNIQUE: AP,lateral and   oblique views.    FINDINGS: Heel soft tissue subcutaneous air adjacent to intact calcaneal   tuberosity.  RIGHT hallux are radiographically intact.  Second and third hammertoe deformities.  Remaining osseous and joint structures of the RIGHT foot are   radiographicallyintact.  The bones and joint spaces are radiographically intact.  No fracture or dislocation.  Soft tissues unremarkable.    IMPRESSION:    Heel subcutaneous air without radiographic evidence of calcaneal   tuberosity osteolysis/osteomyelitis.  Firsthallux radiographically intact..  If osteomyelitis is clinically considered  despite conservative therapy,   and soft tissue / bone infection requires further assessment, follow-up   MRI recommended.    --- End of Report ---      < end of copied text >    Advanced directives addressed: full resuscitation

## 2023-05-25 NOTE — PROGRESS NOTE ADULT - SUBJECTIVE AND OBJECTIVE BOX
Events Overnight:  on low dose Levophed, awake, on vent , had right sided pigtail which drained 1200    HPI:     78 y/o male with PMH HTN, HLD, DM2, recent prolonged hospital course in [place], [place] after a IWMI (underwent PCI with MEDINA to LCx) complicated by cardiogenic shock requiring ECMO, acute hypoxic resp failure requiring trach, dysphagia requiring PEG, BARBRA requiring dialysis, pA.fib, acute CVA, septic shock from Pseudomonas bacteremia, hemoptysis, GI bleed   Now transferred to  for further care.  off pressors, bp ok,   On trach collar.    Dialysis catheter removed was removed  on cefepime for Klebsiella  pseudomonas in sputum carbamazepam resistant  5/23:  deteriorated on 5/23 pm CT Chest showing Large B/L Effusions, Hyperkalemic   possible sepsis, on Levophed, on cefepime   has right heel foot wound  scrotal wound    PAST MEDICAL & SURGICAL HISTORY:  STEMI (ST elevation myocardial infarction)  Cardiogenic shock  Stroke  History of chronic respiratory failure        MEDICATIONS  (STANDING):  aMIOdarone    Tablet 200 milliGRAM(s) Oral daily  ascorbic acid 500 milliGRAM(s) Oral daily  cadexomer iodine 0.9% Gel 1 Application(s) Topical daily  cefepime   IVPB 2000 milliGRAM(s) IV Intermittent every 12 hours  chlorhexidine 0.12% Liquid 15 milliLiter(s) Oral Mucosa every 12 hours  chlorhexidine 4% Liquid 1 Application(s) Topical <User Schedule>  cholecalciferol 2000 Unit(s) Oral daily  clopidogrel Tablet 75 milliGRAM(s) Enteral Tube daily  collagenase Ointment 1 Application(s) Topical daily  heparin   Injectable 5000 Unit(s) SubCutaneous every 12 hours  melatonin 5 milliGRAM(s) Oral at bedtime  norepinephrine Infusion 0.05 MICROgram(s)/kG/Min (4.99 mL/Hr) IV Continuous <Continuous>  nystatin Powder 1 Application(s) Topical two times a day  pantoprazole  Injectable 40 milliGRAM(s) IV Push daily  potassium chloride   Powder 40 milliEquivalent(s) Enteral Tube once  QUEtiapine 50 milliGRAM(s) Oral at bedtime  thiamine 100 milliGRAM(s) Oral daily    MEDICATIONS  (PRN):  ALPRAZolam 0.25 milliGRAM(s) Oral every 8 hours PRN agitation  HYDROmorphone  Injectable 1 milliGRAM(s) IV Push every 4 hours PRN Severe Pain (7 - 10)    ICU Vital Signs Last 24 Hrs  T(C): 37.1 (25 May 2023 05:00), Max: 37.1 (25 May 2023 05:00)  T(F): 98.8 (25 May 2023 05:00), Max: 98.8 (25 May 2023 05:00)  HR: 98 (25 May 2023 10:00) (89 - 118)  BP: 109/60 (25 May 2023 10:00) (93/46 - 118/66)  BP(mean): 71 (25 May 2023 10:00) (56 - 78)  ABP: --  ABP(mean): --  RR: 19 (25 May 2023 10:00) (0 - 25)  SpO2: 100% (25 May 2023 10:00) (95% - 100%)    O2 Parameters below as of 24 May 2023 21:00  Patient On (Oxygen Delivery Method): ventilator    O2 Concentration (%): 30    Physical Exam          Mode: AC/ CMV (Assist Control/ Continuous Mandatory Ventilation)  RR (machine): 18  TV (machine): 400  FiO2: 30  PEEP: 5  ITime: 0.9  MAP: 9  PIP: 19      I&O's Summary    24 May 2023 07:01  -  25 May 2023 07:00  --------------------------------------------------------  IN: 2424 mL / OUT: 1185 mL / NET: 1239 mL                                       7.2    8.60  )-----------( 217      ( 25 May 2023 05:54 )             23.6       05-25    148<H>  |  114<H>  |  33<H>  ----------------------------<  186<H>  3.6   |  27  |  0.83    Ca    8.7      25 May 2023 05:54  Phos  3.0     05-25  Mg     1.9     05-25                      DVT Prophylaxis:                                                                 Advanced Directives:

## 2023-05-25 NOTE — PROGRESS NOTE ADULT - SUBJECTIVE AND OBJECTIVE BOX
Date of Service: 5/25/23  HPI: 78 yo male with hx of DM, HTN, HLD, who presented initially to an outside hospital in [place], [place] on 3/1/23 with chest pain and lightheadedness after doing yard work, found to have an inferiolateral ST elevations.  He was taken to the cath lab and underwent PCI with MEDINA placement to mLCX ([facility] [place] 4 x 26mm), dLAD ([facility] [place] 2.5 x 15mm and 2.25 x 12mm), and Impella CP was placed.   Patient was cannulated for peripheral VA ECMO on 3/2 and decannulated on 3/9.  Impella was removed 3/10.  Hospital course was complicated by multiple watershed strokes, acute respiratory failure requiring tracheostomy, BARBRA requiring renal replacement therapy, upper GI bleed, massive hemoptysis originating from lingula requiring bronchial blocker and bronchial artery embolization, pseudomonas bacteremia and septic shock.    Patient was eventually transported from [facility] on 5/16 to [facility] CCU with a tracheostomy, PEG, tunneled right sided HD catheter, right sided PICC line, rectal tube, and ostomy bag over his penis for urine collection.     (16 May 2023 16:49)    Podiatry consulted for R foot evaluation. Pt at bedside, non verbal, family present. Per family, they report wounds being present for approx 3-4wks. Family admits to noticing yellow drainage from R big toe. Pt no aware pt missing R big toe nail. No additional pedal complaints reported at this time.     5/25/23: Pt seen by Podiatry team for follow up of R foot wounds.     PMH: STEMI (ST elevation myocardial infarction)    Cardiogenic shock    Stroke    History of chronic respiratory failure      PSH:    Allergies:No Known Allergies      Labs:                                              7.2    8.60  )-----------( 217      ( 25 May 2023 05:54 )             23.6     05-25    148<H>  |  114<H>  |  33<H>  ----------------------------<  186<H>  3.6   |  27  |  0.83    Ca    8.7      25 May 2023 05:54  Phos  3.0     05-25  Mg     1.9     05-25            MEDICATIONS  (STANDING):  aMIOdarone    Tablet 200 milliGRAM(s) Oral daily  ascorbic acid 500 milliGRAM(s) Oral daily  cadexomer iodine 0.9% Gel 1 Application(s) Topical daily  cefepime   IVPB 2000 milliGRAM(s) IV Intermittent every 12 hours  chlorhexidine 0.12% Liquid 15 milliLiter(s) Oral Mucosa every 12 hours  chlorhexidine 4% Liquid 1 Application(s) Topical <User Schedule>  cholecalciferol 2000 Unit(s) Oral daily  clopidogrel Tablet 75 milliGRAM(s) Enteral Tube daily  collagenase Ointment 1 Application(s) Topical daily  heparin   Injectable 5000 Unit(s) SubCutaneous every 12 hours  melatonin 5 milliGRAM(s) Oral at bedtime  norepinephrine Infusion 0.05 MICROgram(s)/kG/Min (4.99 mL/Hr) IV Continuous <Continuous>  nystatin Powder 1 Application(s) Topical two times a day  pantoprazole  Injectable 40 milliGRAM(s) IV Push daily  QUEtiapine 50 milliGRAM(s) Oral at bedtime  thiamine 100 milliGRAM(s) Oral daily    MEDICATIONS  (PRN):  ALPRAZolam 0.25 milliGRAM(s) Oral every 8 hours PRN agitation  HYDROmorphone  Injectable 1 milliGRAM(s) IV Push every 4 hours PRN Severe Pain (7 - 10)         Vital Signs Last 24 Hrs  T(C): 37.1 (25 May 2023 05:00), Max: 37.1 (25 May 2023 05:00)  T(F): 98.8 (25 May 2023 05:00), Max: 98.8 (25 May 2023 05:00)  HR: 102 (25 May 2023 09:44) (89 - 118)  BP: 118/66 (25 May 2023 06:00) (93/46 - 118/66)  BP(mean): 78 (25 May 2023 06:00) (56 - 78)  RR: 23 (25 May 2023 06:00) (0 - 25)  SpO2: 100% (25 May 2023 09:44) (95% - 100%)    Parameters below as of 24 May 2023 21:00  Patient On (Oxygen Delivery Method): ventilator    O2 Concentration (%): 30      Physical Exam:   Constitutional: NAD, alert;  Derm:  Skin warm, dry and supple bilateral.    Wound to nail bed of dorsal Left hallux with absent nail, superficial in nature, no hyperkeratotic border, wound base fibrogranular, wound size (approx 2.8 cm X 2cm), no edema, no purulence, no fluctuance, no tracking/tunneling, no probe to bone, mild sanguinous discharge distal to eponychium, eschar noted to medial distal hallux and at eponychium.   Pressure stage 2 wound to Right heel, no PTB, no crepitus, no fluctuance, superficial in nature with pink wound bed.   Vascular: Dorsalis Pedis and Posterior Tibial pulses non palpable.  Capillary refill delayed bilateral.    Neuro: Unable to access due to pt condition  MSK: Unable to access due to pt condition        < from: Xray Foot AP + Lateral + Oblique, Right (05.17.23 @ 15:36) >  INTERPRETATION:  RIGHT foot    CLINICAL INFORMATION: Hallux and heel wound TECHNIQUE: AP,lateral and   oblique views.    FINDINGS: Heel soft tissue subcutaneous air adjacent to intact calcaneal   tuberosity.  RIGHT hallux are radiographically intact.  Second and third hammertoe deformities.  Remaining osseous and joint structures of the RIGHT foot are   radiographicallyintact.  The bones and joint spaces are radiographically intact.  No fracture or dislocation.  Soft tissues unremarkable.    IMPRESSION:    Heel subcutaneous air without radiographic evidence of calcaneal   tuberosity osteolysis/osteomyelitis.  Firsthallux radiographically intact..  If osteomyelitis is clinically considered  despite conservative therapy,   and soft tissue / bone infection requires further assessment, follow-up   MRI recommended.    --- End of Report ---    < end of copied text >

## 2023-05-25 NOTE — PROCEDURE NOTE - NSPROCDETAILS_GEN_ALL_CORE
Seldinger technique/dressing applied/secured in place/sterile dressing applied/thoracostomy tube placed percutaneously/ultrasound assessment of fluid (location)
Seldinger technique

## 2023-05-25 NOTE — PROGRESS NOTE ADULT - ASSESSMENT
78 yo male with hx of DM, HTN, HLD, who presented initially to an outside hospital in [place], [place] on 3/1/23 with chest pain and lightheadedness after doing yard work, found to have an inferiolateral ST elevations.  He was taken to the cath lab and underwent PCI with MEDINA placement to mLCX (Resolute [place] 4 x 26mm), dLAD ([facility]olute [place] 2.5 x 15mm and 2.25 x 12mm), and Impella CP was placed.  Patient was cannulated for peripheral VA ECMO on 3/2 and decannulated on 3/9.  Impella was removed 3/10.  Hospital course was complicated by multiple watershed strokes, acute respiratory failure requiring tracheostomy, BARBRA requiring renal replacement therapy, upper GI bleed, massive hemoptysis originating from lingula requiring bronchial blocker and bronchial artery embolization, pseudomonas bacteremia and septic shock.  Patient was eventually transported from [facility] on 5/16 to [facility] CCU with a tracheostomy, PEG, tunneled right sided HD catheter, right sided PICC line, rectal tube, and ostomy bag over his penis for urine collection.       1. Septic shock. Acute on chronic respiratory failure. Bilateral pleural effusions. Complicated UTI with Enterobacter/Ecoli. Scrotal infection/cellulitis. R heel/R hallux wounds.    - on pressors/ventilatory support   - s/p R pigtail f/u pleural cx, may require L pigtail as well   - urine cx growing Enterobacter/Ecoli sensitivities reviewed   - s/p rocephin 5/16-5/18   - s/p IV cefepime [id number] #4 -->5/19- 5/22  - abx broadened to meropenem [id number] on 5/23   - abx de-escalated to cefepime [id number] #2  - repeat blood cx 5/23 no growth  - xray noted, prob congestion over pna, sputum cx growing psae/kleb aerogenes in sputum could be colonization - cefepime will cover  - urology eval noted, CT abd/pelvis reviewed 5/19  - continue with antibiotic coverage  - podiatry eval noted  - blood cx no growth   - tolerating abx well so far; no side effects noted  - reason for abx use and side effects reviewed with patient  - supportive care    2. other issues - care per medicine

## 2023-05-25 NOTE — PROGRESS NOTE ADULT - ASSESSMENT
78 yo male with hx of DM, HTN, HLD, s/p STEMI complicated by cardiogenic shock, BARBRA requiring dialysis, acute respiratory failure requiring trach/peg.  Transported from [place] to [place] at family request.  Found to have scrotal wounds/ pressure ulcers.  Growing pseudomonas and klebsiella in sputum,      1. s/p MI with cardiogentic shock, last echo ef 65%,   2. renal failure  3. GI tolerating tube feeds/s/p PEG  4. scrotal infection  5. chronic respiratory failure     Plan  CCU    PULM: Patient back on the vent over night.  large B/L Effusions.s/p   Right Pig Tail Today, may need left pigtail    Cardio: Hypotensive on Levo.  Likely from Septic Shock, on levophed titrate off    Renal: Recurrent BARBRA with Hyperkalemia. Repeat BMP improved,     GI: Continue Feeds, PPI    ID: cefepime  repeat culturest    SQH DVT Prophylaxis      76 yo male with hx of DM, HTN, HLD, s/p STEMI complicated by cardiogenic shock, BARBRA requiring dialysis, acute respiratory failure requiring trach/peg.  Transported from [place] to [place] at family request.  Found to have scrotal wounds/ pressure ulcers.  Growing pseudomonas and klebsiella in sputum,      1. s/p MI with cardiogentic shock, last echo ef 65%,   2. renal failure  3. GI tolerating tube feeds/s/p PEG  4. scrotal infection  5. chronic respiratory failure     Plan  CCU    PULM: Patient back on the vent over night.  large B/L Effusions.s/p   Right Pig Tail Today, may need left pigtail  Cardio: Hypotensive on Levo.  Likely from Septic Shock, on levophed titrate off  Renal: Recurrent BARBRA with Hyperkalemia. Repeat BMP improved,   GI: Continue Feeds, PPI  Hgb - hgb 7.2 stable, monitor,   ID: cefepime  repeat cultures  SQH DVT Prophylaxis  wound care, to right foot being seen by podiatry,

## 2023-05-26 LAB
ANION GAP SERPL CALC-SCNC: 5 MMOL/L — SIGNIFICANT CHANGE UP (ref 5–17)
BUN SERPL-MCNC: 35 MG/DL — HIGH (ref 7–23)
CALCIUM SERPL-MCNC: 9.4 MG/DL — SIGNIFICANT CHANGE UP (ref 8.5–10.1)
CHLORIDE SERPL-SCNC: 115 MMOL/L — HIGH (ref 96–108)
CO2 SERPL-SCNC: 27 MMOL/L — SIGNIFICANT CHANGE UP (ref 22–31)
CREAT SERPL-MCNC: 0.72 MG/DL — SIGNIFICANT CHANGE UP (ref 0.5–1.3)
EGFR: 95 ML/MIN/1.73M2 — SIGNIFICANT CHANGE UP
GLUCOSE SERPL-MCNC: 192 MG/DL — HIGH (ref 70–99)
HCT VFR BLD CALC: 26.5 % — LOW (ref 39–50)
HGB BLD-MCNC: 8 G/DL — LOW (ref 13–17)
MAGNESIUM SERPL-MCNC: 1.8 MG/DL — SIGNIFICANT CHANGE UP (ref 1.6–2.6)
MCHC RBC-ENTMCNC: 30.2 GM/DL — LOW (ref 32–36)
MCHC RBC-ENTMCNC: 31.3 PG — SIGNIFICANT CHANGE UP (ref 27–34)
MCV RBC AUTO: 103.5 FL — HIGH (ref 80–100)
PHOSPHATE SERPL-MCNC: 3.3 MG/DL — SIGNIFICANT CHANGE UP (ref 2.5–4.5)
PLATELET # BLD AUTO: 247 K/UL — SIGNIFICANT CHANGE UP (ref 150–400)
POTASSIUM SERPL-MCNC: 4.2 MMOL/L — SIGNIFICANT CHANGE UP (ref 3.5–5.3)
POTASSIUM SERPL-SCNC: 4.2 MMOL/L — SIGNIFICANT CHANGE UP (ref 3.5–5.3)
RBC # BLD: 2.56 M/UL — LOW (ref 4.2–5.8)
RBC # FLD: 17.1 % — HIGH (ref 10.3–14.5)
SODIUM SERPL-SCNC: 147 MMOL/L — HIGH (ref 135–145)
WBC # BLD: 10.86 K/UL — HIGH (ref 3.8–10.5)
WBC # FLD AUTO: 10.86 K/UL — HIGH (ref 3.8–10.5)

## 2023-05-26 PROCEDURE — 99291 CRITICAL CARE FIRST HOUR: CPT

## 2023-05-26 RX ADMIN — Medication 4.99 MICROGRAM(S)/KG/MIN: at 23:08

## 2023-05-26 RX ADMIN — CHLORHEXIDINE GLUCONATE 15 MILLILITER(S): 213 SOLUTION TOPICAL at 22:19

## 2023-05-26 RX ADMIN — CHLORHEXIDINE GLUCONATE 1 APPLICATION(S): 213 SOLUTION TOPICAL at 06:25

## 2023-05-26 RX ADMIN — NYSTATIN CREAM 1 APPLICATION(S): 100000 CREAM TOPICAL at 09:57

## 2023-05-26 RX ADMIN — CEFEPIME 100 MILLIGRAM(S): 1 INJECTION, POWDER, FOR SOLUTION INTRAMUSCULAR; INTRAVENOUS at 22:18

## 2023-05-26 RX ADMIN — CLOPIDOGREL BISULFATE 75 MILLIGRAM(S): 75 TABLET, FILM COATED ORAL at 09:55

## 2023-05-26 RX ADMIN — HEPARIN SODIUM 5000 UNIT(S): 5000 INJECTION INTRAVENOUS; SUBCUTANEOUS at 09:55

## 2023-05-26 RX ADMIN — HEPARIN SODIUM 5000 UNIT(S): 5000 INJECTION INTRAVENOUS; SUBCUTANEOUS at 22:17

## 2023-05-26 RX ADMIN — Medication 5 MILLIGRAM(S): at 22:18

## 2023-05-26 RX ADMIN — Medication 1 APPLICATION(S): at 09:56

## 2023-05-26 RX ADMIN — CHLORHEXIDINE GLUCONATE 15 MILLILITER(S): 213 SOLUTION TOPICAL at 10:33

## 2023-05-26 RX ADMIN — PANTOPRAZOLE SODIUM 40 MILLIGRAM(S): 20 TABLET, DELAYED RELEASE ORAL at 09:55

## 2023-05-26 RX ADMIN — Medication 2000 UNIT(S): at 09:55

## 2023-05-26 RX ADMIN — NYSTATIN CREAM 1 APPLICATION(S): 100000 CREAM TOPICAL at 22:18

## 2023-05-26 RX ADMIN — Medication 100 MILLIGRAM(S): at 09:55

## 2023-05-26 RX ADMIN — QUETIAPINE FUMARATE 50 MILLIGRAM(S): 200 TABLET, FILM COATED ORAL at 22:18

## 2023-05-26 RX ADMIN — AMIODARONE HYDROCHLORIDE 200 MILLIGRAM(S): 400 TABLET ORAL at 09:55

## 2023-05-26 RX ADMIN — Medication 500 MILLIGRAM(S): at 09:54

## 2023-05-26 NOTE — PROGRESS NOTE ADULT - SUBJECTIVE AND OBJECTIVE BOX
Date of service: 05-26-23 @ 17:26    pt seen and examined  fevers down  on ventilatory support  s/p bilateral chest tube placement      ROS: unable to obtain d/t medical condition    ROS: unable to obtain d/t medical condition    MEDICATIONS  (STANDING):  aMIOdarone    Tablet 200 milliGRAM(s) Oral daily  ascorbic acid 500 milliGRAM(s) Oral daily  cadexomer iodine 0.9% Gel 1 Application(s) Topical daily  cefepime   IVPB 2000 milliGRAM(s) IV Intermittent every 12 hours  chlorhexidine 0.12% Liquid 15 milliLiter(s) Oral Mucosa every 12 hours  chlorhexidine 4% Liquid 1 Application(s) Topical <User Schedule>  cholecalciferol 2000 Unit(s) Oral daily  clopidogrel Tablet 75 milliGRAM(s) Enteral Tube daily  collagenase Ointment 1 Application(s) Topical daily  heparin   Injectable 5000 Unit(s) SubCutaneous every 12 hours  melatonin 5 milliGRAM(s) Oral at bedtime  norepinephrine Infusion 0.05 MICROgram(s)/kG/Min (4.99 mL/Hr) IV Continuous <Continuous>  nystatin Powder 1 Application(s) Topical two times a day  pantoprazole  Injectable 40 milliGRAM(s) IV Push daily  QUEtiapine 50 milliGRAM(s) Oral at bedtime  thiamine 100 milliGRAM(s) Oral daily    Vital Signs Last 24 Hrs  T(C): 37.1 (26 May 2023 17:03), Max: 37.7 (26 May 2023 00:00)  T(F): 98.7 (26 May 2023 17:03), Max: 99.8 (26 May 2023 00:00)  HR: 106 (26 May 2023 16:52) (91 - 111)  BP: 94/49 (26 May 2023 16:00) (94/49 - 126/65)  BP(mean): 60 (26 May 2023 16:00) (54 - 81)  RR: 22 (26 May 2023 10:00) (15 - 25)  SpO2: 100% (26 May 2023 16:52) (94% - 100%)    Parameters below as of 26 May 2023 16:52  Patient On (Oxygen Delivery Method): tracheostomy collar    O2 Concentration (%): 40      PE:  Constitutional: frail looking  HEENT: NC/AT, EOMI, PERRLA, conjunctivae clear; ears and nose atraumatic; pharynx benign + trach   Neck: supple; thyroid not palpable  Back: no tenderness  Respiratory: decreased breath sounds chest tubes in place  Cardiovascular: S1S2 regular, no murmurs  Abdomen: soft, not tender, not distended, positive BS; liver and spleen WNL  Genitourinary: no suprapubic tenderness Scrotum skin edema and erythema with superficial skin excoriation  Lymphatic: no LN palpable  Musculoskeletal: no muscle tenderness, no joint swelling or tenderness  Extremities: no pedal edema R heel/R hallux wound  Neurological/ Psychiatric: moving all extremities  Skin: no rashes; no palpable lesions    Labs: all available labs reviewed                          8.0    10.86 )-----------( 247      ( 26 May 2023 06:49 )             26.5     05-26    147<H>  |  115<H>  |  35<H>  ----------------------------<  192<H>  4.2   |  27  |  0.72    Ca    9.4      26 May 2023 06:49  Phos  3.3     05-26  Mg     1.8     05-26    Culture - Sputum (05.16.23 @ 23:14)   - Ceftazidime/Avibactam: S <=4  - Resistance Gene to Carbapenem: Nondet  - Ciprofloxacin: S <=0.25  - Ciprofloxacin: S <=0.25  - Ertapenem: S <=0.5  - Trimethoprim/Sulfamethoxazole: S <=0.5/9.5  Gram Stain:   Rare polymorphonuclear leukocytes per low power field   Few Squamous epithelial cells per low power field   Numerous Gram Negative Rods per oil power field  - Amikacin: S <=16  - Amikacin: S <=16  - Amoxicillin/Clavulanic Acid: R 16/8  - Ampicillin: R <=8 These ampicillin results predict results for amoxicillin  - Ampicillin/Sulbactam: R <=4/2 Enterobacter, Klebsiella aerogenes, Citrobacter, and Serratia may develop resistance during prolonged therapy (3-4 days)  - Aztreonam: S <=4  - Aztreonam: I 16  - Cefazolin: R >16 Enterobacter, Klebsiella aerogenes, Citrobacter, and Serratia may develop resistance during prolonged therapy (3-4 days)  - Cefepime: S <=2  - Cefepime: S 8  - Cefoxitin: R >16  - Ceftazidime: I 16  - Ceftriaxone: S <=1 Enterobacter, Klebsiella aerogenes, Citrobacter, and Serratia may develop resistance during prolonged therapy  - Gentamicin: S <=2  - Gentamicin: S 4  - Imipenem: S <=1  - Imipenem: R 4  - Levofloxacin: S 1  - Levofloxacin: S <=0.5  - Meropenem: S <=1  - Meropenem: S <=1  - Piperacillin/Tazobactam: S <=8  - Piperacillin/Tazobactam: I 64  - Tobramycin: S <=2  - Tobramycin: S <=2  Specimen Source: .Sputum Sputum  Culture Results:   Numerous Pseudomonas aeruginosa   Moderate Klebsiella aerogenes (Carbapenem Resistant)   Normal Respiratory Cristiane absent  Organism Identification: Pseudomonas aeruginosa   Klebsiella aerogenes (Carbapenem Resistant)   Klebsiella aerogenes (Carbapenem Resistant)  Specimen Source: Clean Catch Clean Catch (Midstream)  Culture Results:   >100,000 CFU/ml Escherichia coli   >100,000 CFU/ml Klebsiella aerogenes (Previously Enterobacter)  Culture - Blood (05.16.23 @ 16:54)   Specimen Source: .Blood None  Culture Results:   No growth to date.    Radiology: all available radiological tests reviewed  < from: CT Chest No Cont (05.23.23 @ 10:18) >  ACC: [id number] EXAM:  CT CHEST   ORDERED BY: HANNAH ESTEVES     PROCEDURE DATE:  05/23/2023          INTERPRETATION:  INDICATION: Respiratory failure    TECHNIQUE: Volumetric images of the chest without intravenous contrast.   Maximum intensity projection images were generated.    COMPARISON: No prior chest CT.    FINDINGS:    LUNGS/AIRWAYS/PLEURA: Tracheostomy cannula tip in the proximal trachea.   Distention of the trachea by the balloon cuff. Mucus within the left   lower lobar and superior segmental bronchi. Symmetric large pleural   effusions and secondary passive atelectasis of lower lobes and the left   upper lobe. Apparent groundglass in both upper lobes at least in part due   to low lung volumes. There may be a component of pulmonaryedema.    LYMPH NODES/MEDIASTINUM: Unremarkable.    HEART/VASCULATURE: Dilated left atrium. No pericardial effusion. Coronary   artery calcifications. Normal caliber aorta.    UPPER ABDOMEN: Left renal cyst. Excreted contrast in the gallbladder.   Diffuse gallbladder wall thickening.    BONES/SOFT TISSUES: Unremarkable.      IMPRESSION:    Large pleural effusions. Possible mild pulmonary edema in the upper   lobes, limited assessment due to low lung volumes.    Distended trachea by tracheostomy cannula balloon cuff. Recommend   adjustment.    Nondistended thick walled gallbladder, favored to be due to gallbladder   wall edema, such as in the setting of volume overload. Consider right   upper quadrant ultrasound or nuclear medicine study to assess for   cholecystitis, if clinically appropriate.    < from: Xray Chest 1 View- PORTABLE-Routine (05.16.23 @ 20:23) >    ACC: [id number] EXAM:  XR CHEST PORTABLE ROUTINE 1V   ORDERED BY: RADHA AVALSO     PROCEDURE DATE:  05/16/2023          INTERPRETATION:  TIME OF EXAM: May 16, 2023 at 8:10 PM.    CLINICAL INFORMATION: Chronic respiratory failure. Tracheostomy.    COMPARISON:  None available    TECHNIQUE:   AP Portable chest x-ray.    INTERPRETATION:    The heart is not enlarged. The thoracic aorta is calcified.  Tracheostomy tube is in place.  Right IJ catheter with tip in the right atrium.  Right upper extremity PICC line. Tip obscured by right IJ catheter. The   line extends at least to the SVC.  There are bilateral perihilar opacities, more extensive on the right.  There are bilateral small pleural effusions with likely associated   passive atelectasis.  No pneumothorax is seen.  No acute bony abnormality is noted.      IMPRESSION:  Lines as above.    Bilateral perihilar opacities, more extensive on the right, that could be   due to pulmonary edema or multifocal pneumonia.    Bilateral small pleural effusions with likely associated passive   atelectasis.    < end of copied text >  < from: Xray Foot AP + Lateral + Oblique, Right (05.17.23 @ 15:36) >    ACC: [id number] EXAM:  XR FOOT COMP MIN 3 VIEWS RT   ORDERED BY: AMAN BAEZA     PROCEDURE DATE:  05/17/2023          INTERPRETATION:  RIGHT foot    CLINICAL INFORMATION: Hallux and heel wound TECHNIQUE: AP,lateral and   oblique views.    FINDINGS: Heel soft tissue subcutaneous air adjacent to intact calcaneal   tuberosity.  RIGHT hallux are radiographically intact.  Second and third hammertoe deformities.  Remaining osseous and joint structures of the RIGHT foot are   radiographicallyintact.  The bones and joint spaces are radiographically intact.  No fracture or dislocation.  Soft tissues unremarkable.    IMPRESSION:    Heel subcutaneous air without radiographic evidence of calcaneal   tuberosity osteolysis/osteomyelitis.  Firsthallux radiographically intact..  If osteomyelitis is clinically considered  despite conservative therapy,   and soft tissue / bone infection requires further assessment, follow-up   MRI recommended.    --- End of Report ---      < end of copied text >    Advanced directives addressed: full resuscitation

## 2023-05-26 NOTE — PROGRESS NOTE ADULT - SUBJECTIVE AND OBJECTIVE BOX
CCU Progress Note    HPI:    S:    Pt seen and examined  HD # 11  DNR  PMHx Dm2, HTN, HLD transferred from [facility] after protracted hospital course for STEMI s/p MEDINA to mLCX and dLAD requiring impella complicated by cardiogenic shock requiring VA ECMO, respiratory failure requiring trach, BARBRA requiring HD, UGIB, massive hemoptysis originating from lingula s/p bronchial blocker and bronchial artery emobolization, pseudomonas bacteremia and septic shock.  Ultimately improved, minimal vent settings, no longer requiring HD, HD stable and was air transported to  with tentative plan for facility transfer.      5/25: s/p drainage Rt chest for large pleural effusion yesterday; drain Lt side today. Remains on vent.   5/26: s/p drainage B/L chest for large pleural effusions. Some improvement in respiratory status but remains on ventilator.    ROS: Unable to verbalize (on vent)    Allergies    No Known Allergies    Intolerances    MEDICATIONS  (STANDING):    aMIOdarone    Tablet 200 milliGRAM(s) Oral daily  ascorbic acid 500 milliGRAM(s) Oral daily  cadexomer iodine 0.9% Gel 1 Application(s) Topical daily  cefepime   IVPB 2000 milliGRAM(s) IV Intermittent every 12 hours  chlorhexidine 0.12% Liquid 15 milliLiter(s) Oral Mucosa every 12 hours  chlorhexidine 4% Liquid 1 Application(s) Topical <User Schedule>  cholecalciferol 2000 Unit(s) Oral daily  clopidogrel Tablet 75 milliGRAM(s) Enteral Tube daily  collagenase Ointment 1 Application(s) Topical daily  heparin   Injectable 5000 Unit(s) SubCutaneous every 12 hours  melatonin 5 milliGRAM(s) Oral at bedtime  norepinephrine Infusion 0.05 MICROgram(s)/kG/Min (4.99 mL/Hr) IV Continuous <Continuous>  nystatin Powder 1 Application(s) Topical two times a day  pantoprazole  Injectable 40 milliGRAM(s) IV Push daily  QUEtiapine 50 milliGRAM(s) Oral at bedtime  thiamine 100 milliGRAM(s) Oral daily    MEDICATIONS  (PRN):    ALPRAZolam 0.25 milliGRAM(s) Oral every 8 hours PRN agitation    Drug Dosing Weight    Height (cm): 165.1 (17 May 2023 20:00)  Weight (kg): 53.2 (16 May 2023 16:35)  BMI (kg/m2): 19.5 (17 May 2023 20:00)  BSA (m2): 1.58 (17 May 2023 20:00)    PAST MEDICAL & SURGICAL HISTORY:    STEMI (ST elevation myocardial infarction)  Cardiogenic shock  Stroke  History of chronic respiratory failure    FAMILY HISTORY:    ROS: See HPI; otherwise, all systems reviewed and negative.    O:    ICU Vital Signs Last 24 Hrs  T(C): 37.3 (26 May 2023 05:00), Max: 37.7 (26 May 2023 00:00)  T(F): 99.2 (26 May 2023 05:00), Max: 99.8 (26 May 2023 00:00)  HR: 106 (26 May 2023 16:52) (91 - 111)  BP: 94/49 (26 May 2023 16:00) (94/49 - 126/65)  BP(mean): 60 (26 May 2023 16:00) (54 - 81)  ABP: --  ABP(mean): --  RR: 22 (26 May 2023 10:00) (15 - 25)  SpO2: 100% (26 May 2023 16:52) (94% - 100%)    O2 Parameters below as of 26 May 2023 16:52  Patient On (Oxygen Delivery Method): tracheostomy collar    O2 Concentration (%): 40    I&O's Detail    25 May 2023 07:01  -  26 May 2023 07:00  --------------------------------------------------------  IN:    Enteral Tube Flush: 700 mL    IV PiggyBack: 150 mL    Nepro with Carb Steady: 550 mL    Norepinephrine: 215.9 mL    Vital1.5: 500 mL  Total IN: 2115.9 mL    OUT:    Chest Tube (mL): 1810 mL    Chest Tube (mL): 1870 mL    Indwelling Catheter - Urethral (mL): 900 mL    Rectal Tube (mL): 100 mL  Total OUT: 4680 mL    Total NET: -2564.1 mL    Mode: standby    PE:    Adult M lying in bed  Appears emaciated and chronically ill  No JVD + trach in place, balloon up, on vent  CTA B/L  S1S2+  Abd soft NTND  1+ edema B/L LE  Awake, sluggish and weak but non focal  Skin pink, warm    LABS:    CBC Full  -  ( 26 May 2023 06:49 )  WBC Count : 10.86 K/uL  RBC Count : 2.56 M/uL  Hemoglobin : 8.0 g/dL  Hematocrit : 26.5 %  Platelet Count - Automated : 247 K/uL  Mean Cell Volume : 103.5 fl  Mean Cell Hemoglobin : 31.3 pg  Mean Cell Hemoglobin Concentration : 30.2 gm/dL  Auto Neutrophil # : x  Auto Lymphocyte # : x  Auto Monocyte # : x  Auto Eosinophil # : x  Auto Basophil # : x  Auto Neutrophil % : x  Auto Lymphocyte % : x  Auto Monocyte % : x  Auto Eosinophil % : x  Auto Basophil % : x    05-26    147<H>  |  115<H>  |  35<H>  ----------------------------<  192<H>  4.2   |  27  |  0.72    Ca    9.4      26 May 2023 06:49  Phos  3.3     05-26  Mg     1.8     05-26          CAPILLARY BLOOD GLUCOSE

## 2023-05-26 NOTE — PROGRESS NOTE ADULT - SUBJECTIVE AND OBJECTIVE BOX
Events Overnight: got left chest tube yesterday with improvement of cxr, now off pressors, alert, advanced to trach collar this am    HPI:    78 y/o male with PMH HTN, HLD, DM2, recent prolonged hospital course in [place], [place] after a IWMI (underwent PCI with MEDINA to LCx) complicated by cardiogenic shock requiring ECMO, acute hypoxic resp failure requiring trach, dysphagia requiring PEG, BARBRA requiring dialysis, pA.fib, acute CVA, septic shock from Pseudomonas bacteremia, hemoptysis, GI bleed   Now transferred to  for further care.  off pressors, bp ok,   On trach collar.    Dialysis catheter removed was removed  on cefepime for Klebsiella  pseudomonas in sputum carbamazepam resistant   deteriorated on 5/23 pm CT Chest showing Large B/L Effusions, Hyperkalemic   possible sepsis, on Levophed, on cefepime   has right heel foot wound  scrotal wound    PAST MEDICAL & SURGICAL HISTORY:  STEMI (ST elevation myocardial infarction)  Cardiogenic shock  Stroke  History of chronic respiratory failure    ROS cant obtain due to trach      MEDICATIONS  (STANDING):  aMIOdarone    Tablet 200 milliGRAM(s) Oral daily  ascorbic acid 500 milliGRAM(s) Oral daily  cadexomer iodine 0.9% Gel 1 Application(s) Topical daily  cefepime   IVPB 2000 milliGRAM(s) IV Intermittent every 12 hours  chlorhexidine 0.12% Liquid 15 milliLiter(s) Oral Mucosa every 12 hours  chlorhexidine 4% Liquid 1 Application(s) Topical <User Schedule>  cholecalciferol 2000 Unit(s) Oral daily  clopidogrel Tablet 75 milliGRAM(s) Enteral Tube daily  collagenase Ointment 1 Application(s) Topical daily  heparin   Injectable 5000 Unit(s) SubCutaneous every 12 hours  melatonin 5 milliGRAM(s) Oral at bedtime  norepinephrine Infusion 0.05 MICROgram(s)/kG/Min (4.99 mL/Hr) IV Continuous <Continuous>  nystatin Powder 1 Application(s) Topical two times a day  pantoprazole  Injectable 40 milliGRAM(s) IV Push daily  QUEtiapine 50 milliGRAM(s) Oral at bedtime  thiamine 100 milliGRAM(s) Oral daily    MEDICATIONS  (PRN):  ALPRAZolam 0.25 milliGRAM(s) Oral every 8 hours PRN agitation    ICU Vital Signs Last 24 Hrs  T(C): 37.3 (26 May 2023 05:00), Max: 37.7 (26 May 2023 00:00)  T(F): 99.2 (26 May 2023 05:00), Max: 99.8 (26 May 2023 00:00)  HR: 105 (26 May 2023 11:00) (72 - 111)  BP: 97/43 (26 May 2023 11:00) (78/38 - 162/77)  BP(mean): 54 (26 May 2023 11:00) (46 - 99)  ABP: --  ABP(mean): --  RR: 22 (26 May 2023 10:00) (15 - 26)  SpO2: 94% (26 May 2023 11:00) (94% - 100%)    O2 Parameters below as of 26 May 2023 10:00  Patient On (Oxygen Delivery Method): tracheostomy collar    O2 Concentration (%): 40    Physical Exam    General  - no distress, frail weak  HEENT nc/at  neck s/p trach site clean  cv rrr  abdomen soft, s/p peg tolerating feeds  lungs bilateral pigtails,   extremties - right foot wound  scrotum wound clean      Mode: standby      I&O's Summary    25 May 2023 07:01  -  26 May 2023 07:00  --------------------------------------------------------  IN: 2115.9 mL / OUT: 4680 mL / NET: -2564.1 mL                         8.0    10.86 )-----------( 247      ( 26 May 2023 06:49 )             26.5       05-26    147<H>  |  115<H>  |  35<H>  ----------------------------<  192<H>  4.2   |  27  |  0.72    Ca    9.4      26 May 2023 06:49  Phos  3.3     05-26  Mg     1.8     05-26    DVT Prophylaxis:    Heparin subq                                                             Advanced Directives: FUll Code

## 2023-05-26 NOTE — PROGRESS NOTE ADULT - ASSESSMENT
76 yo male with hx of DM, HTN, HLD, who presented initially to an outside hospital in [place], [place] on 3/1/23 with chest pain and lightheadedness after doing yard work, found to have an inferiolateral ST elevations.  He was taken to the cath lab and underwent PCI with MEDINA placement to mLCX (Resolute Ever [place] 4 x 26mm), dLAD (Resolute [place] 2.5 x 15mm and 2.25 x 12mm), and Impella CP was placed.  Patient was cannulated for peripheral VA ECMO on 3/2 and decannulated on 3/9.  Impella was removed 3/10.  Hospital course was complicated by multiple watershed strokes, acute respiratory failure requiring tracheostomy, BARBRA requiring renal replacement therapy, upper GI bleed, massive hemoptysis originating from lingula requiring bronchial blocker and bronchial artery embolization, pseudomonas bacteremia and septic shock.  Patient was eventually transported from [facility] on 5/16 to [facility] CCU with a tracheostomy, PEG, tunneled right sided HD catheter, right sided PICC line, rectal tube, and ostomy bag over his penis for urine collection.       1. Septic shock. Acute on chronic respiratory failure. Bilateral pleural effusions. Complicated UTI with Enterobacter/Ecoli. Scrotal infection/cellulitis. R heel/R hallux wounds.    - on pressors/ventilatory support   - s/p R pigtail f/u pleural cx, may require L pigtail as well   - urine cx growing Enterobacter/Ecoli sensitivities reviewed   - s/p rocephin 5/16-5/18   - s/p IV cefepime [id number] #4 -->5/19- 5/22  - abx broadened to meropenem [id number] on 5/23   - abx de-escalated to cefepime [id number] #2  - repeat blood cx 5/23 no growth  - xray noted, prob congestion over pna, sputum cx growing psae/kleb aerogenes in sputum could be colonization - cefepime will cover  - urology eval noted, CT abd/pelvis reviewed 5/19  - continue with antibiotic coverage  - podiatry eval noted  - blood cx no growth   - tolerating abx well so far; no side effects noted  - reason for abx use and side effects reviewed with patient  - supportive care    2. other issues - care per medicine

## 2023-05-26 NOTE — PROGRESS NOTE ADULT - ASSESSMENT
A:    77M  HD # 11  DNR    Here for:    1. Shock, septic, hypovolemic 2/2  2. PNA  3. Acute + chronic resp failure  4. Dysphagia s/p PEG  5. Anemia  6. Scrotal cellulitis    P:    Pt critically ill with vasopressors being actively titrated for hemodynamic support, on ventilator being actively managed    s/p B/L chest drainage harper 14F pigtail; see procedure, CXR in place  Hypotensive; give albumin 50g x 3 runs for volume + oncotic effects  Active vent mgmt via tracheostomy, weaning as able and tolerated  Levophed, actively titrating to defend MAP > 65;, wean as able  TTE results noted  Cefepime 2g q12h, s/p merrem  Per , on surgical intervention for scrotum  f/u Cx of pleural fluids, sputum, blood; all Cxs neg thus far  Imaging reviewed  No s/s active bleeding, give pRBC for Hgb < 7    Dispo: Cont critical care.    CCT 38 minutes

## 2023-05-26 NOTE — PROGRESS NOTE ADULT - ASSESSMENT
78 yo male with hx of DM, HTN, HLD, who presented initially to an outside hospital in [place], [place] on 3/1/23 with chest pain and lightheadedness after doing yard work, found to have an inferiolateral ST elevations.  He was taken to the cath lab and underwent PCI with MEDINA placement to mLCX (Resolute Ever [place] 4 x 26mm), dLAD (Resolute Ever [place] 2.5 x 15mm and 2.25 x 12mm), and Impella CP was placed.  Patient was cannulated for peripheral VA ECMO on 3/2 and decannulated on 3/9.  Impella was removed 3/10.  Hospital course was complicated by multiple watershed strokes, acute respiratory failure requiring tracheostomy, BARBRA requiring renal replacement therapy, upper GI bleed, massive hemoptysis originating from lingula requiring bronchial blocker and bronchial artery embolization, pseudomonas bacteremia and septic shock.  Patient was eventually transported from [facility] on 5/16 to [facility] CCU with a tracheostomy, PEG, tunneled right sided HD catheter, right sided PICC line, rectal tube, and ostomy bag over his penis for urine collection.       1. Septic shock. Acute on chronic respiratory failure. Bilateral pleural effusions. Complicated UTI with Enterobacter/Ecoli. Scrotal infection/cellulitis. R heel/R hallux wounds.    - on pressors/ventilatory support   - s/p bilateral chest tubes for lung effusions - pleural cx no growth   - urine cx growing Enterobacter/Ecoli sensitivities reviewed   - s/p rocephin 5/16-5/18   - s/p IV cefepime [id number] #4 -->5/19- 5/22  - abx broadened to meropenem [id number] on 5/23   - abx de-escalated to cefepime [id number] #3  - repeat blood cx 5/23 no growth, trach cx 5/25 nl rivas  - xray noted, prob congestion over pna, sputum cx 5/16 growing psae/kleb aerogenes in sputum could be colonization - cefepime will cover  - urology eval noted, CT abd/pelvis reviewed 5/19  - continue with antibiotic coverage, suggest 10 day abx course  - podiatry eval noted  - blood cx no growth   - tolerating abx well so far; no side effects noted  - reason for abx use and side effects reviewed with patient    2. other issues - care per medicine

## 2023-05-26 NOTE — PROGRESS NOTE ADULT - ASSESSMENT
78 yo male with hx of DM, HTN, HLD, s/p STEMI complicated by cardiogenic shock, BARBRA requiring dialysis, acute respiratory failure requiring trach/peg.  Transported from [place] to [place] at family request.  Found to have scrotal wounds/ pressure ulcers.  Grew pseudomonas and klebsiella in sputum,  nothing in blood.   had drainage of bilateral effusions    1. s/p MI with cardiogentic shock, last echo ef 65%,   2. renal failure improved  3. GI tolerating tube feeds/s/p PEG  4. scrotal infection  5. chronic respiratory failure bilateral effusions, s/p bilateral pigtails    Plan  CCU    PULM:   s/p drainage of pigtails, will wan to trach collar    Cardio: Hypotensive was on Levo.  Likely from Septic Shock, titrated off this am    Renal:   BARBRA with Hyperkalemia.  improved    GI: Continue Feeds, PPI will give trial of PO when off vent    ID: cefepime, repeat sputum culture negative,     SQH DVT Prophylaxis    PT

## 2023-05-26 NOTE — PROGRESS NOTE ADULT - SUBJECTIVE AND OBJECTIVE BOX
pt seen and examined  fevers down  on ventilatory support  on low dose pressor  s/p R pigtail placement 1200 cc outpt    ROS: unable to obtain d/t medical condition        Vital Signs Last 24 Hrs  T(C): 37.3 (25 May 2023 09:48), Max: 37.3 (25 May 2023 09:48)  T(F): 99.1 (25 May 2023 09:48), Max: 99.1 (25 May 2023 09:48)  HR: 72 (25 May 2023 13:00) (72 - 118)  BP: 78/38 (25 May 2023 13:00) (78/38 - 118/66)  BP(mean): 46 (25 May 2023 13:00) (46 - 78)  RR: 25 (25 May 2023 13:00) (14 - 25)  SpO2: 100% (25 May 2023 13:00) (95% - 100%)    Parameters below as of 24 May 2023 21:00  Patient On (Oxygen Delivery Method): ventilator    O2 Concentration (%): 30    PE:  Constitutional: frail looking  HEENT: NC/AT, EOMI, PERRLA, conjunctivae clear; ears and nose atraumatic; pharynx benign + trach   Neck: supple; thyroid not palpable  Back: no tenderness  Respiratory: decreased breath sounds   Cardiovascular: S1S2 regular, no murmurs  Abdomen: soft, not tender, not distended, positive BS; liver and spleen WNL  Genitourinary: no suprapubic tenderness Scrotum skin edema and erythema with superficial skin excoriation  Lymphatic: no LN palpable  Musculoskeletal: no muscle tenderness, no joint swelling or tenderness  Extremities: no pedal edema R heel/R hallux wound  Neurological/ Psychiatric: moving all extremities  Skin: no rashes; no palpable lesions    Labs: all available labs reviewed                          7.2    8.60  )-----------( 217      ( 25 May 2023 05:54 )             23.6     05-25    148<H>  |  114<H>  |  33<H>  ----------------------------<  186<H>  3.6   |  27  |  0.83    Ca    8.7      25 May 2023 05:54  Phos  3.0     05-25  Mg     1.9     05-25            Culture - Sputum (05.16.23 @ 23:14)   - Ceftazidime/Avibactam: S <=4  - Resistance Gene to Carbapenem: Nondet  - Ciprofloxacin: S <=0.25  - Ciprofloxacin: S <=0.25  - Ertapenem: S <=0.5  - Trimethoprim/Sulfamethoxazole: S <=0.5/9.5  Gram Stain:   Rare polymorphonuclear leukocytes per low power field   Few Squamous epithelial cells per low power field   Numerous Gram Negative Rods per oil power field  - Amikacin: S <=16  - Amikacin: S <=16  - Amoxicillin/Clavulanic Acid: R 16/8  - Ampicillin: R <=8 These ampicillin results predict results for amoxicillin  - Ampicillin/Sulbactam: R <=4/2 Enterobacter, Klebsiella aerogenes, Citrobacter, and Serratia may develop resistance during prolonged therapy (3-4 days)  - Aztreonam: S <=4  - Aztreonam: I 16  - Cefazolin: R >16 Enterobacter, Klebsiella aerogenes, Citrobacter, and Serratia may develop resistance during prolonged therapy (3-4 days)  - Cefepime: S <=2  - Cefepime: S 8  - Cefoxitin: R >16  - Ceftazidime: I 16  - Ceftriaxone: S <=1 Enterobacter, Klebsiella aerogenes, Citrobacter, and Serratia may develop resistance during prolonged therapy  - Gentamicin: S <=2  - Gentamicin: S 4  - Imipenem: S <=1  - Imipenem: R 4  - Levofloxacin: S 1  - Levofloxacin: S <=0.5  - Meropenem: S <=1  - Meropenem: S <=1  - Piperacillin/Tazobactam: S <=8  - Piperacillin/Tazobactam: I 64  - Tobramycin: S <=2  - Tobramycin: S <=2  Specimen Source: .Sputum Sputum  Culture Results:   Numerous Pseudomonas aeruginosa   Moderate Klebsiella aerogenes (Carbapenem Resistant)   Normal Respiratory Cristiane absent  Organism Identification: Pseudomonas aeruginosa   Klebsiella aerogenes (Carbapenem Resistant)   Klebsiella aerogenes (Carbapenem Resistant)  Specimen Source: Clean Catch Clean Catch (Midstream)  Culture Results:   >100,000 CFU/ml Escherichia coli   >100,000 CFU/ml Klebsiella aerogenes (Previously Enterobacter)  Culture - Blood (05.16.23 @ 16:54)   Specimen Source: .Blood None  Culture Results:   No growth to date.    Radiology: all available radiological tests reviewed  < from: CT Chest No Cont (05.23.23 @ 10:18) >  ACC: [id number] EXAM:  CT CHEST   ORDERED BY: HANNAH ESTEVES     PROCEDURE DATE:  05/23/2023          INTERPRETATION:  INDICATION: Respiratory failure    TECHNIQUE: Volumetric images of the chest without intravenous contrast.   Maximum intensity projection images were generated.    COMPARISON: No prior chest CT.    FINDINGS:    LUNGS/AIRWAYS/PLEURA: Tracheostomy cannula tip in the proximal trachea.   Distention of the trachea by the balloon cuff. Mucus within the left   lower lobar and superior segmental bronchi. Symmetric large pleural   effusions and secondary passive atelectasis of lower lobes and the left   upper lobe. Apparent groundglass in both upper lobes at least in part due   to low lung volumes. There may be a component of pulmonaryedema.    LYMPH NODES/MEDIASTINUM: Unremarkable.    HEART/VASCULATURE: Dilated left atrium. No pericardial effusion. Coronary   artery calcifications. Normal caliber aorta.    UPPER ABDOMEN: Left renal cyst. Excreted contrast in the gallbladder.   Diffuse gallbladder wall thickening.    BONES/SOFT TISSUES: Unremarkable.      IMPRESSION:    Large pleural effusions. Possible mild pulmonary edema in the upper   lobes, limited assessment due to low lung volumes.    Distended trachea by tracheostomy cannula balloon cuff. Recommend   adjustment.    Nondistended thick walled gallbladder, favored to be due to gallbladder   wall edema, such as in the setting of volume overload. Consider right   upper quadrant ultrasound or nuclear medicine study to assess for   cholecystitis, if clinically appropriate.    < from: Xray Chest 1 View- PORTABLE-Routine (05.16.23 @ 20:23) >    ACC: [id number] EXAM:  XR CHEST PORTABLE ROUTINE 1V   ORDERED BY: RADHA AVALOS     PROCEDURE DATE:  05/16/2023          INTERPRETATION:  TIME OF EXAM: May 16, 2023 at 8:10 PM.    CLINICAL INFORMATION: Chronic respiratory failure. Tracheostomy.    COMPARISON:  None available    TECHNIQUE:   AP Portable chest x-ray.    INTERPRETATION:    The heart is not enlarged. The thoracic aorta is calcified.  Tracheostomy tube is in place.  Right IJ catheter with tip in the right atrium.  Right upper extremity PICC line. Tip obscured by right IJ catheter. The   line extends at least to the SVC.  There are bilateral perihilar opacities, more extensive on the right.  There are bilateral small pleural effusions with likely associated   passive atelectasis.  No pneumothorax is seen.  No acute bony abnormality is noted.      IMPRESSION:  Lines as above.    Bilateral perihilar opacities, more extensive on the right, that could be   due to pulmonary edema or multifocal pneumonia.    Bilateral small pleural effusions with likely associated passive   atelectasis.    < end of copied text >  < from: Xray Foot AP + Lateral + Oblique, Right (05.17.23 @ 15:36) >    ACC: [id number] EXAM:  XR FOOT COMP MIN 3 VIEWS RT   ORDERED BY: AMAN BAEZA     PROCEDURE DATE:  05/17/2023          INTERPRETATION:  RIGHT foot    CLINICAL INFORMATION: Hallux and heel wound TECHNIQUE: AP,lateral and   oblique views.    FINDINGS: Heel soft tissue subcutaneous air adjacent to intact calcaneal   tuberosity.  RIGHT hallux are radiographically intact.  Second and third hammertoe deformities.  Remaining osseous and joint structures of the RIGHT foot are   radiographicallyintact.  The bones and joint spaces are radiographically intact.  No fracture or dislocation.  Soft tissues unremarkable.    IMPRESSION:    Heel subcutaneous air without radiographic evidence of calcaneal   tuberosity osteolysis/osteomyelitis.  Firsthallux radiographically intact..  If osteomyelitis is clinically considered  despite conservative therapy,   and soft tissue / bone infection requires further assessment, follow-up   MRI recommended.    --- End of Report ---      < end of copied text >    Advanced directives addressed: full resuscitation

## 2023-05-26 NOTE — PROGRESS NOTE ADULT - ASSESSMENT
Assessment: 76yo Male seen for the following:  -Partial thickness wound to R hallux  -Partial thickness wound to R heel  -Diabetes Mellitus type 2  -Difficulty with ambulation    Plan:   -Chart reviewed and Patient evaluated;  -Discussed diagnosis and treatment with patient.  -Xrays of R foot reviewed: on wet read showing no acute cortical demineralization, noted subtle radiolucency by subcutaneous tissue of posterior heel.   -Wound flush with normal saline. Official impression noted above.   -Applied betadine to Right hallux and xeroform to R heel with DSD  -Wound to Right hallux is superficial in nature with scant sanguinous discharge on evaluation and surrounding eschar tissue, no pus/purulence, no PTB, stable at this time.   Wound to Right heel is superficial in nature with pink wound bed, no fluctuance, no crepitus, no PTB, stable at this time.   -WC: idosorb and DSD  -Offloading of bilateral heels with CAIR boots or while bedbound to reduce further tissue damage   -5/18- on xray noted subtle air by subcutaneous tissue at posterior heel. Re-assessed heel wound at bedside today evening. No fluctuance, no crepitus, no PTB. The subcutaneous air noted on xray is air from wound.   -Will continue to monitor wounds and continue local wound care at this time.   -Continue with IV antibiotics As Per ID  -All additional care per Med appreciated  -Patient tolerated interventions well without any complications.   -Podiatry will follow while in house.

## 2023-05-26 NOTE — PROGRESS NOTE ADULT - SUBJECTIVE AND OBJECTIVE BOX
Date of Service: 5/26/23  HPI: 78 yo male with hx of DM, HTN, HLD, who presented initially to an outside hospital in [place], [place] on 3/1/23 with chest pain and lightheadedness after doing yard work, found to have an inferiolateral ST elevations.  He was taken to the cath lab and underwent PCI with MEDINA placement to mLCX (Resolute Niles Datil 4 x 26mm), dLAD (Resolute Niles Datil 2.5 x 15mm and 2.25 x 12mm), and Impella CP was placed.   Patient was cannulated for peripheral [facility] ECMO on 3/2 and decannulated on 3/9.  Impella was removed 3/10.  Hospital course was complicated by multiple watershed strokes, acute respiratory failure requiring tracheostomy, BARBRA requiring renal replacement therapy, upper GI bleed, massive hemoptysis originating from lingula requiring bronchial blocker and bronchial artery embolization, pseudomonas bacteremia and septic shock.    Patient was eventually transported from [facility] on 5/16 to [facility] CCU with a tracheostomy, PEG, tunneled right sided HD catheter, right sided PICC line, rectal tube, and ostomy bag over his penis for urine collection.     (16 May 2023 16:49)    Podiatry consulted for R foot evaluation. Pt at bedside, non verbal, family present. Per family, they report wounds being present for approx 3-4wks. Family admits to noticing yellow drainage from R big toe. Pt no aware pt missing R big toe nail. No additional pedal complaints reported at this time.     5/26/23: Pt seen by Podiatry team for follow up of R foot wounds.     PMH: STEMI (ST elevation myocardial infarction)    Cardiogenic shock    Stroke    History of chronic respiratory failure      PSH:    Allergies:No Known Allergies      Labs:                                              8.0    10.86 )-----------( 247      ( 26 May 2023 06:49 )             26.5   05-26    147<H>  |  115<H>  |  35<H>  ----------------------------<  192<H>  4.2   |  27  |  0.72    Ca    9.4      26 May 2023 06:49  Phos  3.3     05-26  Mg     1.8     05-26                MEDICATIONS  (STANDING):  aMIOdarone    Tablet 200 milliGRAM(s) Oral daily  ascorbic acid 500 milliGRAM(s) Oral daily  cadexomer iodine 0.9% Gel 1 Application(s) Topical daily  cefepime   IVPB 2000 milliGRAM(s) IV Intermittent every 12 hours  chlorhexidine 0.12% Liquid 15 milliLiter(s) Oral Mucosa every 12 hours  chlorhexidine 4% Liquid 1 Application(s) Topical <User Schedule>  cholecalciferol 2000 Unit(s) Oral daily  clopidogrel Tablet 75 milliGRAM(s) Enteral Tube daily  collagenase Ointment 1 Application(s) Topical daily  heparin   Injectable 5000 Unit(s) SubCutaneous every 12 hours  melatonin 5 milliGRAM(s) Oral at bedtime  norepinephrine Infusion 0.05 MICROgram(s)/kG/Min (4.99 mL/Hr) IV Continuous <Continuous>  nystatin Powder 1 Application(s) Topical two times a day  pantoprazole  Injectable 40 milliGRAM(s) IV Push daily  QUEtiapine 50 milliGRAM(s) Oral at bedtime  thiamine 100 milliGRAM(s) Oral daily    MEDICATIONS  (PRN):  ALPRAZolam 0.25 milliGRAM(s) Oral every 8 hours PRN agitation           Vital Signs Last 24 Hrs  T(C): 37.3 (26 May 2023 05:00), Max: 37.7 (26 May 2023 00:00)  T(F): 99.2 (26 May 2023 05:00), Max: 99.8 (26 May 2023 00:00)  HR: 105 (26 May 2023 11:00) (72 - 111)  BP: 97/43 (26 May 2023 11:00) (78/38 - 162/77)  BP(mean): 54 (26 May 2023 11:00) (46 - 99)  RR: 22 (26 May 2023 10:00) (15 - 26)  SpO2: 94% (26 May 2023 11:00) (94% - 100%)    Parameters below as of 26 May 2023 10:00  Patient On (Oxygen Delivery Method): tracheostomy collar    O2 Concentration (%): 40      Physical Exam:   Constitutional: NAD, alert;  Derm:  Skin warm, dry and supple bilateral.    Wound to nail bed of dorsal Left hallux with absent nail, superficial in nature, no hyperkeratotic border, wound base fibrogranular, wound size (approx 2.8 cm X 2cm), no edema, no purulence, no fluctuance, no tracking/tunneling, no probe to bone, mild sanguinous discharge distal to eponychium, eschar noted to medial distal hallux and at eponychium.   Pressure stage 2 wound to Right heel, no PTB, no crepitus, no fluctuance, superficial in nature with pink wound bed.   Vascular: Dorsalis Pedis and Posterior Tibial pulses non palpable.  Capillary refill delayed bilateral.    Neuro: Unable to access due to pt condition  MSK: Unable to access due to pt condition        < from: Xray Foot AP + Lateral + Oblique, Right (05.17.23 @ 15:36) >  INTERPRETATION:  RIGHT foot    CLINICAL INFORMATION: Hallux and heel wound TECHNIQUE: AP,lateral and   oblique views.    FINDINGS: Heel soft tissue subcutaneous air adjacent to intact calcaneal   tuberosity.  RIGHT hallux are radiographically intact.  Second and third hammertoe deformities.  Remaining osseous and joint structures of the RIGHT foot are   radiographicallyintact.  The bones and joint spaces are radiographically intact.  No fracture or dislocation.  Soft tissues unremarkable.    IMPRESSION:    Heel subcutaneous air without radiographic evidence of calcaneal   tuberosity osteolysis/osteomyelitis.  Firsthallux radiographically intact..  If osteomyelitis is clinically considered  despite conservative therapy,   and soft tissue / bone infection requires further assessment, follow-up   MRI recommended.    --- End of Report ---    < end of copied text >

## 2023-05-27 LAB
ANION GAP SERPL CALC-SCNC: 7 MMOL/L — SIGNIFICANT CHANGE UP (ref 5–17)
BUN SERPL-MCNC: 28 MG/DL — HIGH (ref 7–23)
CALCIUM SERPL-MCNC: 9.9 MG/DL — SIGNIFICANT CHANGE UP (ref 8.5–10.1)
CHLORIDE SERPL-SCNC: 113 MMOL/L — HIGH (ref 96–108)
CO2 SERPL-SCNC: 28 MMOL/L — SIGNIFICANT CHANGE UP (ref 22–31)
CREAT SERPL-MCNC: 0.6 MG/DL — SIGNIFICANT CHANGE UP (ref 0.5–1.3)
CULTURE RESULTS: SIGNIFICANT CHANGE UP
EGFR: 101 ML/MIN/1.73M2 — SIGNIFICANT CHANGE UP
GLUCOSE SERPL-MCNC: 181 MG/DL — HIGH (ref 70–99)
HCT VFR BLD CALC: 26.2 % — LOW (ref 39–50)
HGB BLD-MCNC: 7.9 G/DL — LOW (ref 13–17)
MCHC RBC-ENTMCNC: 30.2 GM/DL — LOW (ref 32–36)
MCHC RBC-ENTMCNC: 31.3 PG — SIGNIFICANT CHANGE UP (ref 27–34)
MCV RBC AUTO: 104 FL — HIGH (ref 80–100)
PLATELET # BLD AUTO: 231 K/UL — SIGNIFICANT CHANGE UP (ref 150–400)
POTASSIUM SERPL-MCNC: 3.3 MMOL/L — LOW (ref 3.5–5.3)
POTASSIUM SERPL-SCNC: 3.3 MMOL/L — LOW (ref 3.5–5.3)
RBC # BLD: 2.52 M/UL — LOW (ref 4.2–5.8)
RBC # FLD: 17.2 % — HIGH (ref 10.3–14.5)
SODIUM SERPL-SCNC: 148 MMOL/L — HIGH (ref 135–145)
SPECIMEN SOURCE: SIGNIFICANT CHANGE UP
WBC # BLD: 10.33 K/UL — SIGNIFICANT CHANGE UP (ref 3.8–10.5)
WBC # FLD AUTO: 10.33 K/UL — SIGNIFICANT CHANGE UP (ref 3.8–10.5)

## 2023-05-27 PROCEDURE — 99291 CRITICAL CARE FIRST HOUR: CPT

## 2023-05-27 RX ORDER — POTASSIUM CHLORIDE 20 MEQ
40 PACKET (EA) ORAL ONCE
Refills: 0 | Status: COMPLETED | OUTPATIENT
Start: 2023-05-27 | End: 2023-05-27

## 2023-05-27 RX ORDER — HYDROMORPHONE HYDROCHLORIDE 2 MG/ML
0.5 INJECTION INTRAMUSCULAR; INTRAVENOUS; SUBCUTANEOUS ONCE
Refills: 0 | Status: DISCONTINUED | OUTPATIENT
Start: 2023-05-27 | End: 2023-05-27

## 2023-05-27 RX ORDER — MIDODRINE HYDROCHLORIDE 2.5 MG/1
10 TABLET ORAL EVERY 8 HOURS
Refills: 0 | Status: DISCONTINUED | OUTPATIENT
Start: 2023-05-27 | End: 2023-06-06

## 2023-05-27 RX ADMIN — AMIODARONE HYDROCHLORIDE 200 MILLIGRAM(S): 400 TABLET ORAL at 10:50

## 2023-05-27 RX ADMIN — CLOPIDOGREL BISULFATE 75 MILLIGRAM(S): 75 TABLET, FILM COATED ORAL at 10:49

## 2023-05-27 RX ADMIN — Medication 40 MILLIEQUIVALENT(S): at 10:48

## 2023-05-27 RX ADMIN — Medication 5 MILLIGRAM(S): at 23:01

## 2023-05-27 RX ADMIN — CHLORHEXIDINE GLUCONATE 15 MILLILITER(S): 213 SOLUTION TOPICAL at 10:50

## 2023-05-27 RX ADMIN — HEPARIN SODIUM 5000 UNIT(S): 5000 INJECTION INTRAVENOUS; SUBCUTANEOUS at 10:55

## 2023-05-27 RX ADMIN — Medication 0.25 MILLIGRAM(S): at 23:01

## 2023-05-27 RX ADMIN — MIDODRINE HYDROCHLORIDE 10 MILLIGRAM(S): 2.5 TABLET ORAL at 23:02

## 2023-05-27 RX ADMIN — Medication 2000 UNIT(S): at 10:49

## 2023-05-27 RX ADMIN — CHLORHEXIDINE GLUCONATE 15 MILLILITER(S): 213 SOLUTION TOPICAL at 23:01

## 2023-05-27 RX ADMIN — CHLORHEXIDINE GLUCONATE 1 APPLICATION(S): 213 SOLUTION TOPICAL at 06:25

## 2023-05-27 RX ADMIN — HYDROMORPHONE HYDROCHLORIDE 0.5 MILLIGRAM(S): 2 INJECTION INTRAMUSCULAR; INTRAVENOUS; SUBCUTANEOUS at 18:28

## 2023-05-27 RX ADMIN — Medication 1 APPLICATION(S): at 11:43

## 2023-05-27 RX ADMIN — CEFEPIME 100 MILLIGRAM(S): 1 INJECTION, POWDER, FOR SOLUTION INTRAMUSCULAR; INTRAVENOUS at 23:02

## 2023-05-27 RX ADMIN — HEPARIN SODIUM 5000 UNIT(S): 5000 INJECTION INTRAVENOUS; SUBCUTANEOUS at 23:01

## 2023-05-27 RX ADMIN — QUETIAPINE FUMARATE 50 MILLIGRAM(S): 200 TABLET, FILM COATED ORAL at 23:01

## 2023-05-27 RX ADMIN — Medication 1 APPLICATION(S): at 10:51

## 2023-05-27 RX ADMIN — Medication 100 MILLIGRAM(S): at 10:49

## 2023-05-27 RX ADMIN — PANTOPRAZOLE SODIUM 40 MILLIGRAM(S): 20 TABLET, DELAYED RELEASE ORAL at 10:55

## 2023-05-27 RX ADMIN — MIDODRINE HYDROCHLORIDE 10 MILLIGRAM(S): 2.5 TABLET ORAL at 13:40

## 2023-05-27 RX ADMIN — Medication 40 MILLIEQUIVALENT(S): at 15:40

## 2023-05-27 RX ADMIN — NYSTATIN CREAM 1 APPLICATION(S): 100000 CREAM TOPICAL at 10:55

## 2023-05-27 RX ADMIN — Medication 500 MILLIGRAM(S): at 10:48

## 2023-05-27 RX ADMIN — CEFEPIME 100 MILLIGRAM(S): 1 INJECTION, POWDER, FOR SOLUTION INTRAMUSCULAR; INTRAVENOUS at 10:51

## 2023-05-27 NOTE — PROGRESS NOTE ADULT - ASSESSMENT
78 yo male with hx of DM, HTN, HLD, s/p STEMI complicated by cardiogenic shock, BARBRA requiring dialysis, acute respiratory failure requiring trach/peg.  Transported from [place] to [place] at family request.  Found to have scrotal wounds/ pressure ulcers.  Grew pseudomonas and klebsiella in sputum,  nothing in blood.   had drainage of bilateral effusions    1. s/p MI with cardiogentic shock, last echo ef 65%,   2. renal failure improved  3. GI tolerating tube feeds/s/p PEG  4. scrotal infection  5. chronic respiratory failure bilateral effusions, s/p bilateral pigtails    Plan  CCU    PULM:   s/p drainage of pigtails, will wean to trach collar,     Cardio: Hypotensive for septic shock, on low dose levophed, start midodrine    Renal:   BARBRA with Hyperkalemia.  improved off dialysis    GI: Continue Feeds, PPI will  have speech/swallow reevaluate    ID: cefepime, repeat sputum culture negative,  also covering scrotal and foot wound    SQH DVT Prophylaxis    PT

## 2023-05-27 NOTE — PROGRESS NOTE ADULT - SUBJECTIVE AND OBJECTIVE BOX
Events Overnight:  Patient on trach Collar, no fevers, breathing better, still with drainage from  pigtails, cxr much improved, remains on low dose pressors    HPI:       78 y/o male with PMH HTN, HLD, DM2, recent prolonged hospital course in [place], [place] after a IWMI (underwent PCI with MEDINA to LCx) complicated by cardiogenic shock requiring ECMO, acute hypoxic resp failure requiring trach, dysphagia requiring PEG, BARBRA requiring dialysis, pA.fib, acute CVA, septic shock from Pseudomonas bacteremia, hemoptysis, GI bleed   Now transferred to  for further care.  off pressors, bp ok,   On trach collar.  Dialysis catheter removed was removed  on cefepime for Klebsiella  pseudomonas in sputum carbamazepam resistant  deteriorated on 5/23 pm CT Chest showing Large B/L Effusions, Hyperkalemic  possible sepsis, on Levophed, on cefepime  has right heel foot wound  scrotal wound improved     PAST MEDICAL & SURGICAL HISTORY:  STEMI (ST elevation myocardial infarction)  Cardiogenic shock  Stroke  History of chronic respiratory failure    ROS cant obtain due to trach    MEDICATIONS  (STANDING):  aMIOdarone    Tablet 200 milliGRAM(s) Oral daily  ascorbic acid 500 milliGRAM(s) Oral daily  cadexomer iodine 0.9% Gel 1 Application(s) Topical daily  cefepime   IVPB 2000 milliGRAM(s) IV Intermittent every 12 hours  chlorhexidine 0.12% Liquid 15 milliLiter(s) Oral Mucosa every 12 hours  chlorhexidine 4% Liquid 1 Application(s) Topical <User Schedule>  cholecalciferol 2000 Unit(s) Oral daily  clopidogrel Tablet 75 milliGRAM(s) Enteral Tube daily  collagenase Ointment 1 Application(s) Topical daily  heparin   Injectable 5000 Unit(s) SubCutaneous every 12 hours  melatonin 5 milliGRAM(s) Oral at bedtime  midodrine 10 milliGRAM(s) Oral every 8 hours  norepinephrine Infusion 0.05 MICROgram(s)/kG/Min (4.99 mL/Hr) IV Continuous <Continuous>  nystatin Powder 1 Application(s) Topical two times a day  pantoprazole  Injectable 40 milliGRAM(s) IV Push daily  potassium chloride   Powder 40 milliEquivalent(s) Oral once  potassium chloride   Powder 40 milliEquivalent(s) Oral once  QUEtiapine 50 milliGRAM(s) Oral at bedtime  thiamine 100 milliGRAM(s) Oral daily    MEDICATIONS  (PRN):  ALPRAZolam 0.25 milliGRAM(s) Oral every 8 hours PRN agitation    ICU Vital Signs Last 24 Hrs  T(C): 36.9 (27 May 2023 05:00), Max: 37.5 (27 May 2023 00:25)  T(F): 98.4 (27 May 2023 05:00), Max: 99.5 (27 May 2023 00:25)  HR: 104 (27 May 2023 06:00) (96 - 111)  BP: 120/53 (27 May 2023 06:00) (84/42 - 120/53)  BP(mean): 70 (27 May 2023 06:00) (51 - 84)  ABP: --  ABP(mean): --  RR: --  SpO2: 100% (27 May 2023 06:00) (93% - 100%)    O2 Parameters below as of 26 May 2023 16:52  Patient On (Oxygen Delivery Method): tracheostomy collar    O2 Concentration (%): 40    Physical Exam:    General  - no distress, frail weak  HEENT nc/at  neck s/p trach site clean  cv - rrr, bilateral pigtails  abdomen soft, s/p peg tolerating feeds  lungs bilateral pigtails, cxr clear  extremities - right foot wound  scrotum wound clean    Mode: standby, 40% trach collar in use  I&O's Summary    26 May 2023 07:01  -  27 May 2023 07:00  --------------------------------------------------------  IN: 2197 mL / OUT: 2300 mL / NET: -103 mL                       8.0    10.86 )-----------( 247      ( 26 May 2023 06:49 )             26.5     05-27    148<H>  |  113<H>  |  28<H>  ----------------------------<  181<H>  3.3<L>   |  28  |  0.60    Ca    9.9      27 May 2023 05:48  Phos  3.3     05-26  Mg     1.8     05-26    DVT Prophylaxis:  Heparin q                                                             Advanced Directives: Full Code

## 2023-05-27 NOTE — PROGRESS NOTE ADULT - SUBJECTIVE AND OBJECTIVE BOX
Date of service: 05-27-23 @ 15:42    pt seen and examined  fevers down  on ventilatory support  chest tubes in place     ROS: unable to obtain d/t medical condition    MEDICATIONS  (STANDING):  aMIOdarone    Tablet 200 milliGRAM(s) Oral daily  ascorbic acid 500 milliGRAM(s) Oral daily  cadexomer iodine 0.9% Gel 1 Application(s) Topical daily  cefepime   IVPB 2000 milliGRAM(s) IV Intermittent every 12 hours  chlorhexidine 0.12% Liquid 15 milliLiter(s) Oral Mucosa every 12 hours  chlorhexidine 4% Liquid 1 Application(s) Topical <User Schedule>  cholecalciferol 2000 Unit(s) Oral daily  clopidogrel Tablet 75 milliGRAM(s) Enteral Tube daily  collagenase Ointment 1 Application(s) Topical daily  heparin   Injectable 5000 Unit(s) SubCutaneous every 12 hours  melatonin 5 milliGRAM(s) Oral at bedtime  midodrine 10 milliGRAM(s) Oral every 8 hours  norepinephrine Infusion 0.05 MICROgram(s)/kG/Min (4.99 mL/Hr) IV Continuous <Continuous>  nystatin Powder 1 Application(s) Topical two times a day  pantoprazole  Injectable 40 milliGRAM(s) IV Push daily  QUEtiapine 50 milliGRAM(s) Oral at bedtime  thiamine 100 milliGRAM(s) Oral daily    MEDICATIONS  (PRN):  ALPRAZolam 0.25 milliGRAM(s) Oral every 8 hours PRN agitation      Vital Signs Last 24 Hrs  T(C): 36.1 (27 May 2023 11:39), Max: 37.5 (27 May 2023 00:25)  T(F): 97 (27 May 2023 11:39), Max: 99.5 (27 May 2023 00:25)  HR: 102 (27 May 2023 12:00) (96 - 111)  BP: 110/53 (27 May 2023 12:00) (84/42 - 120/53)  BP(mean): 67 (27 May 2023 12:00) (51 - 84)  RR: 20 (27 May 2023 08:00) (20 - 20)  SpO2: 100% (27 May 2023 12:00) (93% - 100%)    Parameters below as of 27 May 2023 12:00  Patient On (Oxygen Delivery Method): tracheostomy collar      PE:  Constitutional: frail looking  HEENT: NC/AT, EOMI, PERRLA, conjunctivae clear; ears and nose atraumatic; pharynx benign + trach   Neck: supple; thyroid not palpable  Back: no tenderness  Respiratory: decreased breath sounds chest tubes in place  Cardiovascular: S1S2 regular, no murmurs  Abdomen: soft, not tender, not distended, positive BS; liver and spleen WNL  Genitourinary: no suprapubic tenderness Scrotum skin edema and erythema with superficial skin excoriation  Lymphatic: no LN palpable  Musculoskeletal: no muscle tenderness, no joint swelling or tenderness  Extremities: no pedal edema R heel/R hallux wound  Neurological/ Psychiatric: moving all extremities  Skin: no rashes; no palpable lesions    Labs: all available labs reviewed                                       7.9    10.33 )-----------( 231      ( 27 May 2023 05:48 )             26.2     05-27    148<H>  |  113<H>  |  28<H>  ----------------------------<  181<H>  3.3<L>   |  28  |  0.60    Ca    9.9      27 May 2023 05:48  Phos  3.3     05-26  Mg     1.8     05-26        Culture - Sputum (05.16.23 @ 23:14)   - Ceftazidime/Avibactam: S <=4  - Resistance Gene to Carbapenem: Nondet  - Ciprofloxacin: S <=0.25  - Ciprofloxacin: S <=0.25  - Ertapenem: S <=0.5  - Trimethoprim/Sulfamethoxazole: S <=0.5/9.5  Gram Stain:   Rare polymorphonuclear leukocytes per low power field   Few Squamous epithelial cells per low power field   Numerous Gram Negative Rods per oil power field  - Amikacin: S <=16  - Amikacin: S <=16  - Amoxicillin/Clavulanic Acid: R 16/8  - Ampicillin: R <=8 These ampicillin results predict results for amoxicillin  - Ampicillin/Sulbactam: R <=4/2 Enterobacter, Klebsiella aerogenes, Citrobacter, and Serratia may develop resistance during prolonged therapy (3-4 days)  - Aztreonam: S <=4  - Aztreonam: I 16  - Cefazolin: R >16 Enterobacter, Klebsiella aerogenes, Citrobacter, and Serratia may develop resistance during prolonged therapy (3-4 days)  - Cefepime: S <=2  - Cefepime: S 8  - Cefoxitin: R >16  - Ceftazidime: I 16  - Ceftriaxone: S <=1 Enterobacter, Klebsiella aerogenes, Citrobacter, and Serratia may develop resistance during prolonged therapy  - Gentamicin: S <=2  - Gentamicin: S 4  - Imipenem: S <=1  - Imipenem: R 4  - Levofloxacin: S 1  - Levofloxacin: S <=0.5  - Meropenem: S <=1  - Meropenem: S <=1  - Piperacillin/Tazobactam: S <=8  - Piperacillin/Tazobactam: I 64  - Tobramycin: S <=2  - Tobramycin: S <=2  Specimen Source: .Sputum Sputum  Culture Results:   Numerous Pseudomonas aeruginosa   Moderate Klebsiella aerogenes (Carbapenem Resistant)   Normal Respiratory Cristiane absent  Organism Identification: Pseudomonas aeruginosa   Klebsiella aerogenes (Carbapenem Resistant)   Klebsiella aerogenes (Carbapenem Resistant)  Specimen Source: Clean Catch Clean Catch (Midstream)  Culture Results:   >100,000 CFU/ml Escherichia coli   >100,000 CFU/ml Klebsiella aerogenes (Previously Enterobacter)  Culture - Blood (05.16.23 @ 16:54)   Specimen Source: .Blood None  Culture Results:   No growth to date.    Radiology: all available radiological tests reviewed  < from: CT Chest No Cont (05.23.23 @ 10:18) >  ACC: [id number] EXAM:  CT CHEST   ORDERED BY: HANNAH ESTEVES     PROCEDURE DATE:  05/23/2023          INTERPRETATION:  INDICATION: Respiratory failure    TECHNIQUE: Volumetric images of the chest without intravenous contrast.   Maximum intensity projection images were generated.    COMPARISON: No prior chest CT.    FINDINGS:    LUNGS/AIRWAYS/PLEURA: Tracheostomy cannula tip in the proximal trachea.   Distention of the trachea by the balloon cuff. Mucus within the left   lower lobar and superior segmental bronchi. Symmetric large pleural   effusions and secondary passive atelectasis of lower lobes and the left   upper lobe. Apparent groundglass in both upper lobes at least in part due   to low lung volumes. There may be a component of pulmonaryedema.    LYMPH NODES/MEDIASTINUM: Unremarkable.    HEART/VASCULATURE: Dilated left atrium. No pericardial effusion. Coronary   artery calcifications. Normal caliber aorta.    UPPER ABDOMEN: Left renal cyst. Excreted contrast in the gallbladder.   Diffuse gallbladder wall thickening.    BONES/SOFT TISSUES: Unremarkable.      IMPRESSION:    Large pleural effusions. Possible mild pulmonary edema in the upper   lobes, limited assessment due to low lung volumes.    Distended trachea by tracheostomy cannula balloon cuff. Recommend   adjustment.    Nondistended thick walled gallbladder, favored to be due to gallbladder   wall edema, such as in the setting of volume overload. Consider right   upper quadrant ultrasound or nuclear medicine study to assess for   cholecystitis, if clinically appropriate.    < from: Xray Chest 1 View- PORTABLE-Routine (05.16.23 @ 20:23) >    ACC: [id number] EXAM:  XR CHEST PORTABLE ROUTINE 1V   ORDERED BY: RADHA AVALOS     PROCEDURE DATE:  05/16/2023          INTERPRETATION:  TIME OF EXAM: May 16, 2023 at 8:10 PM.    CLINICAL INFORMATION: Chronic respiratory failure. Tracheostomy.    COMPARISON:  None available    TECHNIQUE:   AP Portable chest x-ray.    INTERPRETATION:    The heart is not enlarged. The thoracic aorta is calcified.  Tracheostomy tube is in place.  Right IJ catheter with tip in the right atrium.  Right upper extremity PICC line. Tip obscured by right IJ catheter. The   line extends at least to the SVC.  There are bilateral perihilar opacities, more extensive on the right.  There are bilateral small pleural effusions with likely associated   passive atelectasis.  No pneumothorax is seen.  No acute bony abnormality is noted.      IMPRESSION:  Lines as above.    Bilateral perihilar opacities, more extensive on the right, that could be   due to pulmonary edema or multifocal pneumonia.    Bilateral small pleural effusions with likely associated passive   atelectasis.    < end of copied text >  < from: Xray Foot AP + Lateral + Oblique, Right (05.17.23 @ 15:36) >    ACC: [id number] EXAM:  XR FOOT COMP MIN 3 VIEWS RT   ORDERED BY: AMAN BAEZA     PROCEDURE DATE:  05/17/2023          INTERPRETATION:  RIGHT foot    CLINICAL INFORMATION: Hallux and heel wound TECHNIQUE: AP,lateral and   oblique views.    FINDINGS: Heel soft tissue subcutaneous air adjacent to intact calcaneal   tuberosity.  RIGHT hallux are radiographically intact.  Second and third hammertoe deformities.  Remaining osseous and joint structures of the RIGHT foot are   radiographicallyintact.  The bones and joint spaces are radiographically intact.  No fracture or dislocation.  Soft tissues unremarkable.    IMPRESSION:    Heel subcutaneous air without radiographic evidence of calcaneal   tuberosity osteolysis/osteomyelitis.  Firsthallux radiographically intact..  If osteomyelitis is clinically considered  despite conservative therapy,   and soft tissue / bone infection requires further assessment, follow-up   MRI recommended.    --- End of Report ---      < end of copied text >    Advanced directives addressed: full resuscitation

## 2023-05-27 NOTE — PROGRESS NOTE ADULT - SUBJECTIVE AND OBJECTIVE BOX
Patient is a 77y old  Male who presents with a chief complaint of Transfer from [facility] (26 May 2023 17:25)      BRIEF HOSPITAL COURSE:   78 yo m pmhx Dm2, HTN, HLD transferred from [facility] after protracted hospital course for STEMI s/p MEDINA to mLCX and dLAD requiring impella complicated by cardiogenic shock requiring VA ECMO, respiratory failure requiring trach, BARBRA requiring HD, UGIB, massive hemoptysis originating from lingula s/p bronchial blocker and bronchial artery emobolization, pseudomonas bacteremia and septic shock.  Ultimately improved, minimal vent settings, no longer requiring HD, HD stable and was air transported to  with tentative plan for facility transfer.        Events last 24 hours:   Patient hypotensive despite albumin, back on levophed for bp support.        PAST MEDICAL & SURGICAL HISTORY:  STEMI (ST elevation myocardial infarction)  Cardiogenic shock  Stroke  History of chronic respiratory failure      Allergies  No Known Allergies      FAMILY HISTORY:      Social History:   from home       Review of Systems:  difficult to obtain      Physical Examination:    General: elderly male, lying in bed, nad    HEENT: trach in place    PULM: diminished b/l    CVS: Regular rate and rhythm    ABD: Soft, nondistended, nontender, normoactive bowel sounds, +peg    EXT: + edema    SKIN: Warm    NEURO: Alert, interactive      Medications:  cefepime   IVPB 2000 milliGRAM(s) IV Intermittent every 12 hours  aMIOdarone    Tablet 200 milliGRAM(s) Oral daily  norepinephrine Infusion 0.05 MICROgram(s)/kG/Min IV Continuous <Continuous>  ALPRAZolam 0.25 milliGRAM(s) Oral every 8 hours PRN  melatonin 5 milliGRAM(s) Oral at bedtime  QUEtiapine 50 milliGRAM(s) Oral at bedtime  clopidogrel Tablet 75 milliGRAM(s) Enteral Tube daily  heparin   Injectable 5000 Unit(s) SubCutaneous every 12 hours  pantoprazole  Injectable 40 milliGRAM(s) IV Push daily  ascorbic acid 500 milliGRAM(s) Oral daily  cholecalciferol 2000 Unit(s) Oral daily  thiamine 100 milliGRAM(s) Oral daily  cadexomer iodine 0.9% Gel 1 Application(s) Topical daily  chlorhexidine 0.12% Liquid 15 milliLiter(s) Oral Mucosa every 12 hours  chlorhexidine 4% Liquid 1 Application(s) Topical <User Schedule>  collagenase Ointment 1 Application(s) Topical daily  nystatin Powder 1 Application(s) Topical two times a day    Mode: standby,40% trach collar in use      ICU Vital Signs Last 24 Hrs  T(C): 36.9 (27 May 2023 05:00), Max: 37.5 (27 May 2023 00:25)  T(F): 98.4 (27 May 2023 05:00), Max: 99.5 (27 May 2023 00:25)  HR: 104 (27 May 2023 06:00) (96 - 111)  BP: 120/53 (27 May 2023 06:00) (84/42 - 121/60)  BP(mean): 70 (27 May 2023 06:00) (51 - 84)  ABP: --  ABP(mean): --  RR: 22 (26 May 2023 10:00) (21 - 22)  SpO2: 100% (27 May 2023 06:00) (93% - 100%)    O2 Parameters below as of 26 May 2023 16:52  Patient On (Oxygen Delivery Method): tracheostomy collar    O2 Concentration (%): 40      Vital Signs Last 24 Hrs  T(C): 36.9 (27 May 2023 05:00), Max: 37.5 (27 May 2023 00:25)  T(F): 98.4 (27 May 2023 05:00), Max: 99.5 (27 May 2023 00:25)  HR: 104 (27 May 2023 06:00) (96 - 111)  BP: 120/53 (27 May 2023 06:00) (84/42 - 121/60)  BP(mean): 70 (27 May 2023 06:00) (51 - 84)  RR: 22 (26 May 2023 10:00) (21 - 22)  SpO2: 100% (27 May 2023 06:00) (93% - 100%)    Parameters below as of 26 May 2023 16:52  Patient On (Oxygen Delivery Method): tracheostomy collar    O2 Concentration (%): 40      I&O's Detail    25 May 2023 07:01  -  26 May 2023 07:00  --------------------------------------------------------  IN:    Enteral Tube Flush: 700 mL    IV PiggyBack: 150 mL    Nepro with Carb Steady: 550 mL    Norepinephrine: 215.9 mL    Vital1.5: 500 mL  Total IN: 2115.9 mL    OUT:    Chest Tube (mL): 1810 mL    Chest Tube (mL): 1870 mL    Indwelling Catheter - Urethral (mL): 900 mL    Rectal Tube (mL): 100 mL  Total OUT: 4680 mL  Total NET: -2564.1 mL      26 May 2023 07:01  -  27 May 2023 06:41  --------------------------------------------------------  IN:    Enteral Tube Flush: 800 mL    Free Water: 100 mL    IV PiggyBack: 50 mL    Nepro with Carb Steady: 1200 mL    Norepinephrine: 47 mL  Total IN: 2197 mL    OUT:    Chest Tube (mL): 300 mL    Chest Tube (mL): 900 mL    Indwelling Catheter - Urethral (mL): 1000 mL    Rectal Tube (mL): 100 mL  Total OUT: 2300 mL  Total NET: -103 mL        LABS:                        8.0    10.86 )-----------( 247      ( 26 May 2023 06:49 )             26.5     05-26    147<H>  |  115<H>  |  35<H>  ----------------------------<  192<H>  4.2   |  27  |  0.72    Ca    9.4      26 May 2023 06:49  Phos  3.3     05-26  Mg     1.8     05-26      CULTURES:  Culture Results:   No growth (05-25 @ 12:00)  Culture Results:   Testing in progress (05-25 @ 12:00)  Culture Results:   Normal Respiratory Cristiane present (05-25 @ 05:45)  Culture Results:   No growth (05-24 @ 14:26)  Culture Results:   No growth to date. (05-23 @ 01:58)  Culture Results:   No growth to date. (05-23 @ 01:58)      RADIOLOGY:   < from: Xray Chest 1 View-PORTABLE IMMEDIATE (Xray Chest 1 View-PORTABLE IMMEDIATE .) (05.25.23 @ 13:13) >  ACC: [id number] EXAM:  XR CHEST PORTABLE IMMED 1V   ORDERED BY: GEETHA BARTLETT     PROCEDURE DATE:  05/25/2023      INTERPRETATION:  INDICATION: Chest tube positioning    COMPARISON: 5/24/2023    FINDINGS:  An AP portable chest x-ray shows atracheostomy tube in situ, grossly   unchanged. There has been placement of a left-sided pigtail chest tube,   directed toward the medial left hemithorax. There has been dramatic   resolution of left pleural effusion. There is a small left apical   pneumothorax. There is a small peripheral right basilar chest tube with   no significant pleural fluid on the right and no right-sided pneumothorax   is seen. There is scarring versus subsegmental atelectasis in the right   upper lobe, unchanged. Thereare no infiltrates. There is no hilar or   mediastinal widening. The cardiac silhouette is likely magnified by   technique, with left coronary artery stent, but no CHF. There are   degenerative changes of the spine and shoulders.    IMPRESSION:  1. Placement of a medially situated left chest tube, terminating in the   medial left hemithorax with resolution of previously noted left pleural   effusion.  2. There is a new small left apical pneumothorax.  3. Right lower peripheral pigtail chest tube in situ with no right-sided   pneumothorax or significant pleural fluid.  4. Tracheostomy tube in situ, grossly unchanged.  5. Scarring versus linear atelectasis in the right upper lobe, stable.  6. Cardiac silhouette is likely magnified by technique,with left   coronary artery stent, atherosclerotic aorta but no CHF.    --- End of Report ---      MARCELA DIAZ MD; Attending Radiologist  This document has been electronically signed. May 25 2023  2:06PM    < end of copied text >      SUPPLEMENTAL O2:   LINES:  BERTHA:  KILO:   PPx:   CONTACT:

## 2023-05-27 NOTE — PROGRESS NOTE ADULT - ASSESSMENT
76 yo male with hx of DM, HTN, HLD, who presented initially to an outside hospital in [place], [place] on 3/1/23 with chest pain and lightheadedness after doing yard work, found to have an inferiolateral ST elevations.  He was taken to the cath lab and underwent PCI with MEDINA placement to mLCX (Resolute Ever [place] 4 x 26mm), dLAD (Resolute Ever [place] 2.5 x 15mm and 2.25 x 12mm), and Impella CP was placed.  Patient was cannulated for peripheral VA ECMO on 3/2 and decannulated on 3/9.  Impella was removed 3/10.  Hospital course was complicated by multiple watershed strokes, acute respiratory failure requiring tracheostomy, BARBRA requiring renal replacement therapy, upper GI bleed, massive hemoptysis originating from lingula requiring bronchial blocker and bronchial artery embolization, pseudomonas bacteremia and septic shock.  Patient was eventually transported from [facility] on 5/16 to [facility] CCU with a tracheostomy, PEG, tunneled right sided HD catheter, right sided PICC line, rectal tube, and ostomy bag over his penis for urine collection.       1. Septic shock. Acute on chronic respiratory failure. Bilateral pleural effusions. Complicated UTI with Enterobacter/Ecoli. Scrotal infection/cellulitis. R heel/R hallux wounds.    - on pressors/ventilatory support   - s/p bilateral chest tubes for lung effusions - pleural cx no growth   - urine cx growing Enterobacter/Ecoli sensitivities reviewed   - s/p rocephin 5/16-5/18   - s/p IV cefepime [id number] #4 -->5/19- 5/22  - abx broadened to meropenem [id number] on 5/23   - abx de-escalated to cefepime [id number] #4   - repeat blood cx 5/23 no growth, trach cx 5/25 nl rivas  - xray noted, prob congestion over pna, sputum cx 5/16 growing psae/kleb aerogenes in sputum could be colonization - cefepime will cover  - urology eval noted, CT abd/pelvis reviewed 5/19  - continue with antibiotic coverage, suggest 10 day abx course  - podiatry eval noted  - blood cx no growth   - tolerating abx well so far; no side effects noted  - reason for abx use and side effects reviewed with patient    2. other issues - care per medicine

## 2023-05-27 NOTE — PROGRESS NOTE ADULT - ASSESSMENT
78 yo m pmhx Dm2, HTN, HLD transferred from [facility] after protracted hospital course for STEMI s/p MEDINA to mLCX and dLAD requiring impella complicated by cardiogenic shock requiring VA ECMO, respiratory failure requiring trach, BARBRA requiring HD, UGIB, massive hemoptysis originating from lingula s/p bronchial blocker and bronchial artery emobolization, pseudomonas bacteremia and septic shock.  Ultimately improved, minimal vent settings, no longer requiring HD, HD stable and was air transported to  with tentative plan for facility transfer.    NEURO: continue xanax and seroquel  CV: Distributive shock requiring vasopressor therapy, actively titrating leovphed for map >65, amio for coverage.  RESP: chronic resp fx, tc as tolerated, to vent prn. chest pt/pulm lavage/suctioning  RENAL: Monitor lytes, replace as needed  GI: NPO with tf  ENDO: No active issues  ID: Cefepime for coverage  HEME: Heparin for vte ppx  DISPO: DNR    Critical Care time: 50 mins assessing presenting problems of acute illness that poses high probability of life threatening deterioration or end organ damage/dysfunction.  Medical decision making including Initiating plan of care, reviewing data, reviewing radiology, discussing with multidisciplinary team, non inclusive of procedures, discussing goals of care with patient/family

## 2023-05-28 LAB
ANION GAP SERPL CALC-SCNC: 5 MMOL/L — SIGNIFICANT CHANGE UP (ref 5–17)
BUN SERPL-MCNC: 29 MG/DL — HIGH (ref 7–23)
CALCIUM SERPL-MCNC: 10.4 MG/DL — HIGH (ref 8.5–10.1)
CHLORIDE SERPL-SCNC: 112 MMOL/L — HIGH (ref 96–108)
CO2 SERPL-SCNC: 28 MMOL/L — SIGNIFICANT CHANGE UP (ref 22–31)
CREAT SERPL-MCNC: 0.52 MG/DL — SIGNIFICANT CHANGE UP (ref 0.5–1.3)
CULTURE RESULTS: SIGNIFICANT CHANGE UP
CULTURE RESULTS: SIGNIFICANT CHANGE UP
EGFR: 105 ML/MIN/1.73M2 — SIGNIFICANT CHANGE UP
GLUCOSE SERPL-MCNC: 166 MG/DL — HIGH (ref 70–99)
HCT VFR BLD CALC: 27.7 % — LOW (ref 39–50)
HGB BLD-MCNC: 8.4 G/DL — LOW (ref 13–17)
MCHC RBC-ENTMCNC: 30.3 GM/DL — LOW (ref 32–36)
MCHC RBC-ENTMCNC: 31.3 PG — SIGNIFICANT CHANGE UP (ref 27–34)
MCV RBC AUTO: 103.4 FL — HIGH (ref 80–100)
PLATELET # BLD AUTO: 249 K/UL — SIGNIFICANT CHANGE UP (ref 150–400)
POTASSIUM SERPL-MCNC: 4.4 MMOL/L — SIGNIFICANT CHANGE UP (ref 3.5–5.3)
POTASSIUM SERPL-SCNC: 4.4 MMOL/L — SIGNIFICANT CHANGE UP (ref 3.5–5.3)
RBC # BLD: 2.68 M/UL — LOW (ref 4.2–5.8)
RBC # FLD: 17.3 % — HIGH (ref 10.3–14.5)
SODIUM SERPL-SCNC: 145 MMOL/L — SIGNIFICANT CHANGE UP (ref 135–145)
SPECIMEN SOURCE: SIGNIFICANT CHANGE UP
SPECIMEN SOURCE: SIGNIFICANT CHANGE UP
WBC # BLD: 12.5 K/UL — HIGH (ref 3.8–10.5)
WBC # FLD AUTO: 12.5 K/UL — HIGH (ref 3.8–10.5)

## 2023-05-28 PROCEDURE — 99291 CRITICAL CARE FIRST HOUR: CPT

## 2023-05-28 RX ADMIN — MIDODRINE HYDROCHLORIDE 10 MILLIGRAM(S): 2.5 TABLET ORAL at 14:30

## 2023-05-28 RX ADMIN — Medication 500 MILLIGRAM(S): at 11:09

## 2023-05-28 RX ADMIN — Medication 5 MILLIGRAM(S): at 23:39

## 2023-05-28 RX ADMIN — CHLORHEXIDINE GLUCONATE 15 MILLILITER(S): 213 SOLUTION TOPICAL at 11:09

## 2023-05-28 RX ADMIN — CLOPIDOGREL BISULFATE 75 MILLIGRAM(S): 75 TABLET, FILM COATED ORAL at 11:10

## 2023-05-28 RX ADMIN — Medication 2000 UNIT(S): at 11:10

## 2023-05-28 RX ADMIN — AMIODARONE HYDROCHLORIDE 200 MILLIGRAM(S): 400 TABLET ORAL at 11:09

## 2023-05-28 RX ADMIN — CHLORHEXIDINE GLUCONATE 1 APPLICATION(S): 213 SOLUTION TOPICAL at 05:25

## 2023-05-28 RX ADMIN — CHLORHEXIDINE GLUCONATE 15 MILLILITER(S): 213 SOLUTION TOPICAL at 23:39

## 2023-05-28 RX ADMIN — MIDODRINE HYDROCHLORIDE 10 MILLIGRAM(S): 2.5 TABLET ORAL at 05:25

## 2023-05-28 RX ADMIN — HEPARIN SODIUM 5000 UNIT(S): 5000 INJECTION INTRAVENOUS; SUBCUTANEOUS at 11:09

## 2023-05-28 RX ADMIN — Medication 1 APPLICATION(S): at 12:30

## 2023-05-28 RX ADMIN — HEPARIN SODIUM 5000 UNIT(S): 5000 INJECTION INTRAVENOUS; SUBCUTANEOUS at 23:39

## 2023-05-28 RX ADMIN — NYSTATIN CREAM 1 APPLICATION(S): 100000 CREAM TOPICAL at 23:00

## 2023-05-28 RX ADMIN — Medication 100 MILLIGRAM(S): at 11:10

## 2023-05-28 RX ADMIN — NYSTATIN CREAM 1 APPLICATION(S): 100000 CREAM TOPICAL at 12:30

## 2023-05-28 RX ADMIN — Medication 0.25 MILLIGRAM(S): at 23:39

## 2023-05-28 RX ADMIN — MIDODRINE HYDROCHLORIDE 10 MILLIGRAM(S): 2.5 TABLET ORAL at 23:39

## 2023-05-28 RX ADMIN — QUETIAPINE FUMARATE 50 MILLIGRAM(S): 200 TABLET, FILM COATED ORAL at 23:39

## 2023-05-28 RX ADMIN — PANTOPRAZOLE SODIUM 40 MILLIGRAM(S): 20 TABLET, DELAYED RELEASE ORAL at 11:09

## 2023-05-28 RX ADMIN — Medication 0.25 MILLIGRAM(S): at 11:10

## 2023-05-28 NOTE — PROGRESS NOTE ADULT - ASSESSMENT
78 yo male with hx of DM, HTN, HLD, who presented initially to an outside hospital in [place], [place] on 3/1/23 with chest pain and lightheadedness after doing yard work, found to have an inferiolateral ST elevations.  He was taken to the cath lab and underwent PCI with MEDINA placement to mLCX (Resolute Ever [place] 4 x 26mm), dLAD (Resolute Ever [place] 2.5 x 15mm and 2.25 x 12mm), and Impella CP was placed.  Patient was cannulated for peripheral VA ECMO on 3/2 and decannulated on 3/9.  Impella was removed 3/10.  Hospital course was complicated by multiple watershed strokes, acute respiratory failure requiring tracheostomy, BARBRA requiring renal replacement therapy, upper GI bleed, massive hemoptysis originating from lingula requiring bronchial blocker and bronchial artery embolization, pseudomonas bacteremia and septic shock.  Patient was eventually transported from [facility] on 5/16 to [facility] CCU with a tracheostomy, PEG, tunneled right sided HD catheter, right sided PICC line, rectal tube, and ostomy bag over his penis for urine collection.       1. Septic shock. Acute on chronic respiratory failure. Bilateral pleural effusions. Complicated UTI with Enterobacter/Ecoli. Scrotal infection/cellulitis. R heel/R hallux wounds.    - s/p bilateral chest tubes for lung effusions - pleural cx no growth   - urine cx growing Enterobacter/Ecoli sensitivities reviewed   - s/p rocephin 5/16-5/18   - s/p IV cefepime [id number] #4 -->5/19- 5/22  - abx broadened to meropenem [id number] on 5/23   - abx de-escalated to cefepime [id number] #5  - repeat blood cx 5/23 no growth, trach cx 5/25 nl rivas  - xray noted, prob congestion over pna, sputum cx 5/16 growing psae/kleb aerogenes in sputum could be colonization - cefepime will cover  - urology eval noted, CT abd/pelvis reviewed 5/19  - completed 10 day abx course - will dc  - podiatry eval noted  - blood cx no growth     2. other issues - care per medicine

## 2023-05-28 NOTE — PROGRESS NOTE ADULT - ASSESSMENT
A:    77M  HD # 13  DNR    Here for:    1. Shock, septic, hypovolemic 2/2  2. PNA  3. Acute + chronic resp failure  4. Dysphagia s/p PEG  5. Anemia  6. Scrotal cellulitis  7. pleural effusions s/p drainage    P:    Pt critically ill with vasopressors being actively titrated for hemodynamic support, on ventilator being actively managed    s/p B/L chest drainage harper 14F pigtail; see procedure, CXR in place  Hypotensive; give albumin 50g x 3 runs for volume + oncotic effects  Active vent mgmt via tracheostomy, weaning as able and tolerated; PSV to TC, now that pleural effusions drained, hopeful to help weaning from ventilator  Levophed, actively titrating to defend MAP > 65;, wean as able  TTE results noted  Cefepime 2g q12h, s/p merrem  Per , on surgical intervention for scrotum  f/u Cx of pleural fluids, sputum, blood; all Cxs neg thus far  Imaging reviewed  No s/s active bleeding, give pRBC for Hgb < 7    Dispo: Cont critical care.    CCT 37 minutes

## 2023-05-28 NOTE — PROGRESS NOTE ADULT - ASSESSMENT
76 yo male with hx of DM, HTN, HLD, s/p STEMI complicated by cardiogenic shock, BARBRA requiring dialysis, acute respiratory failure requiring trach/peg.  Transported from [place] to [place] at family request.  Found to have scrotal wounds/ pressure ulcers.  Grew pseudomonas and klebsiella in sputum,  nothing in blood.   had drainage of bilateral effusions    1. s/p MI with cardiogentic shock, last echo ef 65%,   2. renal failure improved  3. GI tolerating tube feeds/s/p PEG  4. scrotal infection  5. chronic respiratory failure bilateral effusions, s/p bilateral pigtails    Plan  CCU    PULM:   s/p drainage of pigtails, will hopefully be able to d/c right tomorrow, on trach collar      if stable would change to cuffless , fenestrated trach  Cardio: Hypotensive for septic shock, on low dose levophed 0.02,   midodrine, titrate off    Renal:   BARBRA with Hyperkalemia.  improved  kidney function now normla    GI: Continue Feeds, PPI will  have speech/swallow reevaluate    ID: cefepime, repeat sputum culture negative,  also covering scrotal and foot wound    SQH DVT Prophylaxis    PT

## 2023-05-28 NOTE — PROGRESS NOTE ADULT - SUBJECTIVE AND OBJECTIVE BOX
Date of Service: 5/28/23  HPI: 78 yo male with hx of DM, HTN, HLD, who presented initially to an outside hospital in [place], [place] on 3/1/23 with chest pain and lightheadedness after doing yard work, found to have an inferiolateral ST elevations.  He was taken to the cath lab and underwent PCI with MEDINA placement to mLCX (Resolute [place] 4 x 26mm), dLAD (Resolute [place] 2.5 x 15mm and 2.25 x 12mm), and Impella CP was placed.   Patient was cannulated for peripheral VA ECMO on 3/2 and decannulated on 3/9.  Impella was removed 3/10.  Hospital course was complicated by multiple watershed strokes, acute respiratory failure requiring tracheostomy, BARBRA requiring renal replacement therapy, upper GI bleed, massive hemoptysis originating from lingula requiring bronchial blocker and bronchial artery embolization, pseudomonas bacteremia and septic shock.    Patient was eventually transported from [facility] Room on 5/16 to [facility] CCU with a tracheostomy, PEG, tunneled right sided HD catheter, right sided PICC line, rectal tube, and ostomy bag over his penis for urine collection.     (16 May 2023 16:49)    Podiatry consulted for R foot evaluation. Pt at bedside, non verbal, family present. Per family, they report wounds being present for approx 3-4wks. Family admits to noticing yellow drainage from R big toe. Pt no aware pt missing R big toe nail. No additional pedal complaints reported at this time.     5/28/23: Pt seen by Podiatry team for follow up of R foot wounds. Pt resting comfortably at bedside, on vent support.     PMH: STEMI (ST elevation myocardial infarction)    Cardiogenic shock    Stroke    History of chronic respiratory failure      PSH:    Allergies:No Known Allergies      Labs:                                   8.4    12.50 )-----------( 249      ( 28 May 2023 05:46 )             27.7   05-28    145  |  112<H>  |  29<H>  ----------------------------<  166<H>  4.4   |  28  |  0.52    Ca    10.4<H>      28 May 2023 05:46              MEDICATIONS  (STANDING):  aMIOdarone    Tablet 200 milliGRAM(s) Oral daily  ascorbic acid 500 milliGRAM(s) Oral daily  cadexomer iodine 0.9% Gel 1 Application(s) Topical daily  cefepime   IVPB 2000 milliGRAM(s) IV Intermittent every 12 hours  chlorhexidine 0.12% Liquid 15 milliLiter(s) Oral Mucosa every 12 hours  chlorhexidine 4% Liquid 1 Application(s) Topical <User Schedule>  cholecalciferol 2000 Unit(s) Oral daily  clopidogrel Tablet 75 milliGRAM(s) Enteral Tube daily  collagenase Ointment 1 Application(s) Topical daily  heparin   Injectable 5000 Unit(s) SubCutaneous every 12 hours  melatonin 5 milliGRAM(s) Oral at bedtime  midodrine 10 milliGRAM(s) Oral every 8 hours  norepinephrine Infusion 0.05 MICROgram(s)/kG/Min (4.99 mL/Hr) IV Continuous <Continuous>  nystatin Powder 1 Application(s) Topical two times a day  pantoprazole  Injectable 40 milliGRAM(s) IV Push daily  QUEtiapine 50 milliGRAM(s) Oral at bedtime  thiamine 100 milliGRAM(s) Oral daily    MEDICATIONS  (PRN):  ALPRAZolam 0.25 milliGRAM(s) Oral every 8 hours PRN agitation  Vital Signs Last 24 Hrs  T(C): 35.7 (28 May 2023 08:50), Max: 36.8 (27 May 2023 21:05)  T(F): 96.3 (28 May 2023 08:50), Max: 98.3 (28 May 2023 04:00)  HR: 98 (28 May 2023 08:00) (93 - 109)  BP: 100/51 (28 May 2023 08:00) (81/40 - 118/59)  BP(mean): 62 (28 May 2023 08:00) (49 - 77)  RR: 29 (28 May 2023 08:00) (18 - 38)  SpO2: 100% (28 May 2023 08:00) (96% - 100%)    Parameters below as of 27 May 2023 16:00  Patient On (Oxygen Delivery Method): tracheostomy collar    O2 Concentration (%): 100      Physical Exam:   Constitutional: NAD, alert;  Derm:  Skin warm, dry and supple bilateral.    Wound to nail bed of dorsal Left hallux with absent nail, superficial in nature, no hyperkeratotic border, wound base fibrogranular, wound size (approx 2.8 cm X 2cm), no edema, no purulence, no fluctuance, no tracking/tunneling, no probe to bone, mild sanguinous discharge distal to eponychium, eschar noted to medial distal hallux and at eponychium.   Pressure stage 2 wound to Right heel, no PTB, no crepitus, no fluctuance, superficial in nature with pink wound bed.   Vascular: Dorsalis Pedis and Posterior Tibial pulses non palpable.  Capillary refill delayed bilateral.    Neuro: Unable to access due to pt condition  MSK: Unable to access due to pt condition        < from: Xray Foot AP + Lateral + Oblique, Right (05.17.23 @ 15:36) >  INTERPRETATION:  RIGHT foot    CLINICAL INFORMATION: Hallux and heel wound TECHNIQUE: AP,lateral and   oblique views.    FINDINGS: Heel soft tissue subcutaneous air adjacent to intact calcaneal   tuberosity.  RIGHT hallux are radiographically intact.  Second and third hammertoe deformities.  Remaining osseous and joint structures of the RIGHT foot are   radiographicallyintact.  The bones and joint spaces are radiographically intact.  No fracture or dislocation.  Soft tissues unremarkable.    IMPRESSION:    Heel subcutaneous air without radiographic evidence of calcaneal   tuberosity osteolysis/osteomyelitis.  Firsthallux radiographically intact..  If osteomyelitis is clinically considered  despite conservative therapy,   and soft tissue / bone infection requires further assessment, follow-up   MRI recommended.    --- End of Report ---    < end of copied text >

## 2023-05-28 NOTE — PROGRESS NOTE ADULT - SUBJECTIVE AND OBJECTIVE BOX
HPI:    S:    Pt seen and examined  HD # 13  DNR  PMHx Dm2, HTN, HLD transferred from [facility] after protracted hospital course for STEMI s/p MEDINA to mLCX and dLAD requiring impella complicated by cardiogenic shock requiring VA ECMO, respiratory failure requiring trach, BARBRA requiring HD, UGIB, massive hemoptysis originating from lingula s/p bronchial blocker and bronchial artery emobolization, pseudomonas bacteremia and septic shock.  Ultimately improved, minimal vent settings, no longer requiring HD, HD stable and was air transported to  with tentative plan for facility transfer.      5/25: s/p drainage Rt chest for large pleural effusion yesterday; drain Lt side today. Remains on vent.   5/26: s/p drainage B/L chest for large pleural effusions. Some improvement in respiratory status but remains on ventilator.  5/28: On ventilator with trails of PSV and TC. pleural drains still with relatively high output.     ROS: Unable to verbalize (on vent)    Allergies    No Known Allergies    Intolerances    MEDICATIONS  (STANDING):    aMIOdarone    Tablet 200 milliGRAM(s) Oral daily  ascorbic acid 500 milliGRAM(s) Oral daily  cadexomer iodine 0.9% Gel 1 Application(s) Topical daily  chlorhexidine 0.12% Liquid 15 milliLiter(s) Oral Mucosa every 12 hours  chlorhexidine 4% Liquid 1 Application(s) Topical <User Schedule>  cholecalciferol 2000 Unit(s) Oral daily  clopidogrel Tablet 75 milliGRAM(s) Enteral Tube daily  collagenase Ointment 1 Application(s) Topical daily  heparin   Injectable 5000 Unit(s) SubCutaneous every 12 hours  melatonin 5 milliGRAM(s) Oral at bedtime  midodrine 10 milliGRAM(s) Oral every 8 hours  norepinephrine Infusion 0.05 MICROgram(s)/kG/Min (4.99 mL/Hr) IV Continuous <Continuous>  nystatin Powder 1 Application(s) Topical two times a day  pantoprazole  Injectable 40 milliGRAM(s) IV Push daily  QUEtiapine 50 milliGRAM(s) Oral at bedtime  thiamine 100 milliGRAM(s) Oral daily    MEDICATIONS  (PRN):    Drug Dosing Weight    Height (cm): 165.1 (17 May 2023 20:00)  Weight (kg): 53.2 (16 May 2023 16:35)  BMI (kg/m2): 19.5 (17 May 2023 20:00)  BSA (m2): 1.58 (17 May 2023 20:00)    PAST MEDICAL & SURGICAL HISTORY:  STEMI (ST elevation myocardial infarction)      Cardiogenic shock      Stroke      History of chronic respiratory failure          FAMILY HISTORY:      ROS: See HPI; otherwise, all systems reviewed and negative.    O:    ICU Vital Signs Last 24 Hrs  T(C): 36.1 (30 May 2023 04:13), Max: 36.3 (29 May 2023 22:43)  T(F): 97 (30 May 2023 04:13), Max: 97.4 (29 May 2023 22:43)  HR: 113 (30 May 2023 07:00) (94 - 113)  BP: 96/54 (30 May 2023 07:00) (85/32 - 123/65)  BP(mean): 64 (30 May 2023 07:00) (36 - 77)  ABP: --  ABP(mean): --  RR: 31 (30 May 2023 07:00) (20 - 37)  SpO2: 98% (30 May 2023 07:00) (98% - 100%)    O2 Parameters below as of 30 May 2023 00:00  Patient On (Oxygen Delivery Method): tracheostomy collar    O2 Concentration (%): 40            I&O's Detail    29 May 2023 07:01  -  30 May 2023 07:00  --------------------------------------------------------  IN:    Enteral Tube Flush: 300 mL    Free Water: 200 mL    Nepro with Carb Steady: 380 mL  Total IN: 880 mL    OUT:    Chest Tube (mL): 350 mL    Chest Tube (mL): 340 mL    Indwelling Catheter - Urethral (mL): 740 mL    Norepinephrine: 0 mL  Total OUT: 1430 mL    Total NET: -550 mL              PE:    Adult M lying in bed  Appears emaciated and chronically ill  No JVD + trach in place, balloon up, on vent  CTA B/L  S1S2+  Abd soft NTND  1+ edema B/L LE  Awake, sluggish and weak but non focal  Skin pink, warm    LABS:    CBC Full  -  ( 30 May 2023 09:11 )  WBC Count : 10.36 K/uL  RBC Count : 2.72 M/uL  Hemoglobin : 8.5 g/dL  Hematocrit : 27.6 %  Platelet Count - Automated : 263 K/uL  Mean Cell Volume : 101.5 fl  Mean Cell Hemoglobin : 31.3 pg  Mean Cell Hemoglobin Concentration : 30.8 gm/dL  Auto Neutrophil # : x  Auto Lymphocyte # : x  Auto Monocyte # : x  Auto Eosinophil # : x  Auto Basophil # : x  Auto Neutrophil % : x  Auto Lymphocyte % : x  Auto Monocyte % : x  Auto Eosinophil % : x  Auto Basophil % : x              CAPILLARY BLOOD GLUCOSE

## 2023-05-28 NOTE — PROGRESS NOTE ADULT - SUBJECTIVE AND OBJECTIVE BOX
Date of service: 05-28-23 @ 13:58    pt seen and examined  fevers down  on trach collar  pressors  chest tubes in place     ROS: unable to obtain d/t medical condition      MEDICATIONS  (STANDING):  aMIOdarone    Tablet 200 milliGRAM(s) Oral daily  ascorbic acid 500 milliGRAM(s) Oral daily  cadexomer iodine 0.9% Gel 1 Application(s) Topical daily  chlorhexidine 0.12% Liquid 15 milliLiter(s) Oral Mucosa every 12 hours  chlorhexidine 4% Liquid 1 Application(s) Topical <User Schedule>  cholecalciferol 2000 Unit(s) Oral daily  clopidogrel Tablet 75 milliGRAM(s) Enteral Tube daily  collagenase Ointment 1 Application(s) Topical daily  heparin   Injectable 5000 Unit(s) SubCutaneous every 12 hours  melatonin 5 milliGRAM(s) Oral at bedtime  midodrine 10 milliGRAM(s) Oral every 8 hours  norepinephrine Infusion 0.05 MICROgram(s)/kG/Min (4.99 mL/Hr) IV Continuous <Continuous>  nystatin Powder 1 Application(s) Topical two times a day  pantoprazole  Injectable 40 milliGRAM(s) IV Push daily  QUEtiapine 50 milliGRAM(s) Oral at bedtime  thiamine 100 milliGRAM(s) Oral daily    Vital Signs Last 24 Hrs  T(C): 35.7 (28 May 2023 08:50), Max: 36.8 (27 May 2023 21:05)  T(F): 96.3 (28 May 2023 08:50), Max: 98.3 (28 May 2023 04:00)  HR: 100 (28 May 2023 20:00) (92 - 107)  BP: 110/53 (28 May 2023 20:00) (81/40 - 118/59)  BP(mean): 67 (28 May 2023 20:00) (49 - 77)  RR: 22 (28 May 2023 20:00) (18 - 37)  SpO2: 100% (28 May 2023 20:00) (97% - 100%)    Parameters below as of 28 May 2023 20:00  Patient On (Oxygen Delivery Method): tracheostomy collar        PE:  Constitutional: frail looking  HEENT: NC/AT, EOMI, PERRLA, conjunctivae clear; ears and nose atraumatic; pharynx benign + trach   Neck: supple; thyroid not palpable  Back: no tenderness  Respiratory: decreased breath sounds chest tubes in place  Cardiovascular: S1S2 regular, no murmurs  Abdomen: soft, not tender, not distended, positive BS; liver and spleen WNL  Genitourinary: no suprapubic tenderness Scrotum skin edema and erythema with superficial skin excoriation  Lymphatic: no LN palpable  Musculoskeletal: no muscle tenderness, no joint swelling or tenderness  Extremities: no pedal edema R heel/R hallux wound  Neurological/ Psychiatric: moving all extremities  Skin: no rashes; no palpable lesions    Labs: all available labs reviewed                                     8.4    12.50 )-----------( 249      ( 28 May 2023 05:46 )             27.7     05-28    145  |  112<H>  |  29<H>  ----------------------------<  166<H>  4.4   |  28  |  0.52    Ca    10.4<H>      28 May 2023 05:46        Culture - Sputum (05.16.23 @ 23:14)   - Ceftazidime/Avibactam: S <=4  - Resistance Gene to Carbapenem: Nondet  - Ciprofloxacin: S <=0.25  - Ciprofloxacin: S <=0.25  - Ertapenem: S <=0.5  - Trimethoprim/Sulfamethoxazole: S <=0.5/9.5  Gram Stain:   Rare polymorphonuclear leukocytes per low power field   Few Squamous epithelial cells per low power field   Numerous Gram Negative Rods per oil power field  - Amikacin: S <=16  - Amikacin: S <=16  - Amoxicillin/Clavulanic Acid: R 16/8  - Ampicillin: R <=8 These ampicillin results predict results for amoxicillin  - Ampicillin/Sulbactam: R <=4/2 Enterobacter, Klebsiella aerogenes, Citrobacter, and Serratia may develop resistance during prolonged therapy (3-4 days)  - Aztreonam: S <=4  - Aztreonam: I 16  - Cefazolin: R >16 Enterobacter, Klebsiella aerogenes, Citrobacter, and Serratia may develop resistance during prolonged therapy (3-4 days)  - Cefepime: S <=2  - Cefepime: S 8  - Cefoxitin: R >16  - Ceftazidime: I 16  - Ceftriaxone: S <=1 Enterobacter, Klebsiella aerogenes, Citrobacter, and Serratia may develop resistance during prolonged therapy  - Gentamicin: S <=2  - Gentamicin: S 4  - Imipenem: S <=1  - Imipenem: R 4  - Levofloxacin: S 1  - Levofloxacin: S <=0.5  - Meropenem: S <=1  - Meropenem: S <=1  - Piperacillin/Tazobactam: S <=8  - Piperacillin/Tazobactam: I 64  - Tobramycin: S <=2  - Tobramycin: S <=2  Specimen Source: .Sputum Sputum  Culture Results:   Numerous Pseudomonas aeruginosa   Moderate Klebsiella aerogenes (Carbapenem Resistant)   Normal Respiratory Cristiane absent  Organism Identification: Pseudomonas aeruginosa   Klebsiella aerogenes (Carbapenem Resistant)   Klebsiella aerogenes (Carbapenem Resistant)  Specimen Source: Clean Catch Clean Catch (Midstream)  Culture Results:   >100,000 CFU/ml Escherichia coli   >100,000 CFU/ml Klebsiella aerogenes (Previously Enterobacter)  Culture - Blood (05.16.23 @ 16:54)   Specimen Source: .Blood None  Culture Results:   No growth to date.    Radiology: all available radiological tests reviewed  < from: CT Chest No Cont (05.23.23 @ 10:18) >  ACC: [id number] EXAM:  CT CHEST   ORDERED BY: HANNAH ESTEVES     PROCEDURE DATE:  05/23/2023          INTERPRETATION:  INDICATION: Respiratory failure    TECHNIQUE: Volumetric images of the chest without intravenous contrast.   Maximum intensity projection images were generated.    COMPARISON: No prior chest CT.    FINDINGS:    LUNGS/AIRWAYS/PLEURA: Tracheostomy cannula tip in the proximal trachea.   Distention of the trachea by the balloon cuff. Mucus within the left   lower lobar and superior segmental bronchi. Symmetric large pleural   effusions and secondary passive atelectasis of lower lobes and the left   upper lobe. Apparent groundglass in both upper lobes at least in part due   to low lung volumes. There may be a component of pulmonaryedema.    LYMPH NODES/MEDIASTINUM: Unremarkable.    HEART/VASCULATURE: Dilated left atrium. No pericardial effusion. Coronary   artery calcifications. Normal caliber aorta.    UPPER ABDOMEN: Left renal cyst. Excreted contrast in the gallbladder.   Diffuse gallbladder wall thickening.    BONES/SOFT TISSUES: Unremarkable.      IMPRESSION:    Large pleural effusions. Possible mild pulmonary edema in the upper   lobes, limited assessment due to low lung volumes.    Distended trachea by tracheostomy cannula balloon cuff. Recommend   adjustment.    Nondistended thick walled gallbladder, favored to be due to gallbladder   wall edema, such as in the setting of volume overload. Consider right   upper quadrant ultrasound or nuclear medicine study to assess for   cholecystitis, if clinically appropriate.    < from: Xray Chest 1 View- PORTABLE-Routine (05.16.23 @ 20:23) >    ACC: [id number] EXAM:  XR CHEST PORTABLE ROUTINE 1V   ORDERED BY: RADHA AVALOS     PROCEDURE DATE:  05/16/2023          INTERPRETATION:  TIME OF EXAM: May 16, 2023 at 8:10 PM.    CLINICAL INFORMATION: Chronic respiratory failure. Tracheostomy.    COMPARISON:  None available    TECHNIQUE:   AP Portable chest x-ray.    INTERPRETATION:    The heart is not enlarged. The thoracic aorta is calcified.  Tracheostomy tube is in place.  Right IJ catheter with tip in the right atrium.  Right upper extremity PICC line. Tip obscured by right IJ catheter. The   line extends at least to the SVC.  There are bilateral perihilar opacities, more extensive on the right.  There are bilateral small pleural effusions with likely associated   passive atelectasis.  No pneumothorax is seen.  No acute bony abnormality is noted.      IMPRESSION:  Lines as above.    Bilateral perihilar opacities, more extensive on the right, that could be   due to pulmonary edema or multifocal pneumonia.    Bilateral small pleural effusions with likely associated passive   atelectasis.    < end of copied text >  < from: Xray Foot AP + Lateral + Oblique, Right (05.17.23 @ 15:36) >    ACC: [id number] EXAM:  XR FOOT COMP MIN 3 VIEWS RT   ORDERED BY: AMAN BAEZA     PROCEDURE DATE:  05/17/2023          INTERPRETATION:  RIGHT foot    CLINICAL INFORMATION: Hallux and heel wound TECHNIQUE: AP,lateral and   oblique views.    FINDINGS: Heel soft tissue subcutaneous air adjacent to intact calcaneal   tuberosity.  RIGHT hallux are radiographically intact.  Second and third hammertoe deformities.  Remaining osseous and joint structures of the RIGHT foot are   radiographicallyintact.  The bones and joint spaces are radiographically intact.  No fracture or dislocation.  Soft tissues unremarkable.    IMPRESSION:    Heel subcutaneous air without radiographic evidence of calcaneal   tuberosity osteolysis/osteomyelitis.  Firsthallux radiographically intact..  If osteomyelitis is clinically considered  despite conservative therapy,   and soft tissue / bone infection requires further assessment, follow-up   MRI recommended.    --- End of Report ---      < end of copied text >    Advanced directives addressed: full resuscitation

## 2023-05-28 NOTE — PROGRESS NOTE ADULT - SUBJECTIVE AND OBJECTIVE BOX
Events Overnight: on 0.02 of LEvophed, right chest tube drained 160, left drained 250, on trach collar awake and alert    HPI:       76 y/o male with PMH HTN, HLD, DM2, recent prolonged hospital course in [place], [place] after a IWMI (underwent PCI with MEDINA to LCx) complicated by cardiogenic shock requiring ECMO, acute hypoxic resp failure requiring trach, dysphagia requiring PEG, BARBRA requiring dialysis, pA.fib, acute CVA, septic shock from Pseudomonas bacteremia, hemoptysis, GI bleed   Now transferred to  for further care.  off pressors, bp ok,   On trach collar.  Dialysis catheter removed was removed  on cefepime for Klebsiella  pseudomonas in sputum carbamazepam resistant  deteriorated on 5/23 pm CT Chest showing Large B/L Effusions, got bilateral pigtails  possible sepsis, on Levophed, on cefepime  has right heel foot wound  scrotal wound improved     PAST MEDICAL & SURGICAL HISTORY:  STEMI (ST elevation myocardial infarction)  Cardiogenic shock  Stroke  History of chronic respiratory failure    ROS cant obtain due to trach      MEDICATIONS  (STANDING):  aMIOdarone    Tablet 200 milliGRAM(s) Oral daily  ascorbic acid 500 milliGRAM(s) Oral daily  cadexomer iodine 0.9% Gel 1 Application(s) Topical daily  chlorhexidine 0.12% Liquid 15 milliLiter(s) Oral Mucosa every 12 hours  chlorhexidine 4% Liquid 1 Application(s) Topical <User Schedule>  cholecalciferol 2000 Unit(s) Oral daily  clopidogrel Tablet 75 milliGRAM(s) Enteral Tube daily  collagenase Ointment 1 Application(s) Topical daily  heparin   Injectable 5000 Unit(s) SubCutaneous every 12 hours  melatonin 5 milliGRAM(s) Oral at bedtime  midodrine 10 milliGRAM(s) Oral every 8 hours  norepinephrine Infusion 0.05 MICROgram(s)/kG/Min (4.99 mL/Hr) IV Continuous <Continuous>  nystatin Powder 1 Application(s) Topical two times a day  pantoprazole  Injectable 40 milliGRAM(s) IV Push daily  QUEtiapine 50 milliGRAM(s) Oral at bedtime  thiamine 100 milliGRAM(s) Oral daily    MEDICATIONS  (PRN):  ALPRAZolam 0.25 milliGRAM(s) Oral every 8 hours PRN agitation    ICU Vital Signs Last 24 Hrs  T(C): 35.7 (28 May 2023 08:50), Max: 36.8 (27 May 2023 21:05)  T(F): 96.3 (28 May 2023 08:50), Max: 98.3 (28 May 2023 04:00)  HR: 98 (28 May 2023 08:00) (93 - 109)  BP: 100/51 (28 May 2023 08:00) (81/40 - 118/59)  BP(mean): 62 (28 May 2023 08:00) (49 - 77)  ABP: --  ABP(mean): --  RR: 29 (28 May 2023 08:00) (18 - 38)  SpO2: 100% (28 May 2023 08:00) (96% - 100%)    O2 Parameters below as of 27 May 2023 16:00  Patient On (Oxygen Delivery Method): tracheostomy collar    O2 Concentration (%): 100    Physical Exam    General  - no distress,  Neuro - frail weak  HEENT nc/at  neck s/p trach site clean  cv - rrr, bilateral pigtails,cxr clear bilateral crackles  abdomen soft, s/p peg tolerating feeds  lungs bilateral pigtails, cxr clear  extremities - right foot wounds clean heal escar  scrotum wound clean    Mode: standby,40% trach collar in use    I&O's Summary    27 May 2023 07:01  -  28 May 2023 07:00  --------------------------------------------------------  IN: 2944 mL / OUT: 2360 mL / NET: 584 mL                        8.4    12.50 )-----------( 249      ( 28 May 2023 05:46 )             27.7       05-28    145  |  112<H>  |  29<H>  ----------------------------<  166<H>  4.4   |  28  |  0.52    Ca    10.4<H>      28 May 2023 05:46    DVT Prophylaxis:   Heparin subq                                                              Advanced Directives: Full Code

## 2023-05-29 LAB
CULTURE RESULTS: NO GROWTH — SIGNIFICANT CHANGE UP
SPECIMEN SOURCE: SIGNIFICANT CHANGE UP

## 2023-05-29 PROCEDURE — 99291 CRITICAL CARE FIRST HOUR: CPT

## 2023-05-29 RX ADMIN — Medication 5 MILLIGRAM(S): at 22:16

## 2023-05-29 RX ADMIN — CHLORHEXIDINE GLUCONATE 15 MILLILITER(S): 213 SOLUTION TOPICAL at 22:17

## 2023-05-29 RX ADMIN — CHLORHEXIDINE GLUCONATE 15 MILLILITER(S): 213 SOLUTION TOPICAL at 10:56

## 2023-05-29 RX ADMIN — NYSTATIN CREAM 1 APPLICATION(S): 100000 CREAM TOPICAL at 22:17

## 2023-05-29 RX ADMIN — NYSTATIN CREAM 1 APPLICATION(S): 100000 CREAM TOPICAL at 10:58

## 2023-05-29 RX ADMIN — MIDODRINE HYDROCHLORIDE 10 MILLIGRAM(S): 2.5 TABLET ORAL at 22:16

## 2023-05-29 RX ADMIN — PANTOPRAZOLE SODIUM 40 MILLIGRAM(S): 20 TABLET, DELAYED RELEASE ORAL at 10:57

## 2023-05-29 RX ADMIN — Medication 1 APPLICATION(S): at 10:58

## 2023-05-29 RX ADMIN — MIDODRINE HYDROCHLORIDE 10 MILLIGRAM(S): 2.5 TABLET ORAL at 05:42

## 2023-05-29 RX ADMIN — Medication 100 MILLIGRAM(S): at 10:57

## 2023-05-29 RX ADMIN — MIDODRINE HYDROCHLORIDE 10 MILLIGRAM(S): 2.5 TABLET ORAL at 14:02

## 2023-05-29 RX ADMIN — Medication 2000 UNIT(S): at 10:57

## 2023-05-29 RX ADMIN — CLOPIDOGREL BISULFATE 75 MILLIGRAM(S): 75 TABLET, FILM COATED ORAL at 10:57

## 2023-05-29 RX ADMIN — Medication 500 MILLIGRAM(S): at 10:57

## 2023-05-29 RX ADMIN — HEPARIN SODIUM 5000 UNIT(S): 5000 INJECTION INTRAVENOUS; SUBCUTANEOUS at 22:16

## 2023-05-29 RX ADMIN — HEPARIN SODIUM 5000 UNIT(S): 5000 INJECTION INTRAVENOUS; SUBCUTANEOUS at 10:58

## 2023-05-29 RX ADMIN — AMIODARONE HYDROCHLORIDE 200 MILLIGRAM(S): 400 TABLET ORAL at 10:57

## 2023-05-29 RX ADMIN — QUETIAPINE FUMARATE 50 MILLIGRAM(S): 200 TABLET, FILM COATED ORAL at 22:16

## 2023-05-29 NOTE — PROGRESS NOTE ADULT - SUBJECTIVE AND OBJECTIVE BOX
78 y/o male with PMH HTN, HLD, DM2, recent prolonged hospital course in [place], [place] after a IWMI (underwent PCI with MEDINA to LCx) complicated by cardiogenic shock requiring ECMO, acute hypoxic resp failure requiring trach, dysphagia requiring PEG, BARBRA requiring dialysis, pA.fib, acute CVA, septic shock from Pseudomonas bacteremia, hemoptysis, GI bleed   Now transferred to  for further care.  off pressors, bp ok,   On trach collar.  Dialysis catheter removed was removed  on cefepime for Klebsiella  pseudomonas in sputum carbamazepam resistant  deteriorated on 5/23 pm CT Chest showing Large B/L Effusions, got bilateral pigtails  possible sepsis, on Levophed, on cefepime  has right heel foot wound  scrotal wound improved.  5/29  Case discussed on CCU rounds. clinically stable.      ICU Vital Signs Last 24 Hrs  T(C): 36.5 (29 May 2023 06:18), Max: 36.5 (29 May 2023 06:18)  T(F): 97.7 (29 May 2023 06:18), Max: 97.7 (29 May 2023 06:18)  HR: 100 (29 May 2023 15:00) (94 - 105)  BP: 99/50 (29 May 2023 15:00) (90/48 - 110/54)  BP(mean): 62 (29 May 2023 15:00) (58 - 70)  RR: 29 (29 May 2023 15:00) (20 - 31)  SpO2: 100% (29 May 2023 15:00) (99% - 100%)    O2 Parameters below as of 29 May 2023 12:40  Patient On (Oxygen Delivery Method): tracheostomy collar    O2 Concentration (%): 40                                8.4    12.50 )-----------( 249      ( 28 May 2023 05:46 )             27.7         05-28    145  |  112<H>  |  29<H>  ----------------------------<  166<H>  4.4   |  28  |  0.52    Ca    10.4<H>      28 May 2023 05:46

## 2023-05-30 LAB
ALBUMIN SERPL ELPH-MCNC: 2.3 G/DL — LOW (ref 3.3–5)
ALP SERPL-CCNC: 184 U/L — HIGH (ref 40–120)
ALT FLD-CCNC: 60 U/L — SIGNIFICANT CHANGE UP (ref 12–78)
ANION GAP SERPL CALC-SCNC: 5 MMOL/L — SIGNIFICANT CHANGE UP (ref 5–17)
AST SERPL-CCNC: 59 U/L — HIGH (ref 15–37)
BILIRUB SERPL-MCNC: 0.4 MG/DL — SIGNIFICANT CHANGE UP (ref 0.2–1.2)
BUN SERPL-MCNC: 31 MG/DL — HIGH (ref 7–23)
CALCIUM SERPL-MCNC: 9.8 MG/DL — SIGNIFICANT CHANGE UP (ref 8.5–10.1)
CHLORIDE SERPL-SCNC: 104 MMOL/L — SIGNIFICANT CHANGE UP (ref 96–108)
CO2 SERPL-SCNC: 30 MMOL/L — SIGNIFICANT CHANGE UP (ref 22–31)
COMMENT - FLUIDS: SIGNIFICANT CHANGE UP
COMMENT - FLUIDS: SIGNIFICANT CHANGE UP
CREAT SERPL-MCNC: 0.53 MG/DL — SIGNIFICANT CHANGE UP (ref 0.5–1.3)
CULTURE RESULTS: SIGNIFICANT CHANGE UP
EGFR: 105 ML/MIN/1.73M2 — SIGNIFICANT CHANGE UP
GLUCOSE SERPL-MCNC: 150 MG/DL — HIGH (ref 70–99)
HCT VFR BLD CALC: 27.6 % — LOW (ref 39–50)
HGB BLD-MCNC: 8.5 G/DL — LOW (ref 13–17)
MAGNESIUM SERPL-MCNC: 1.9 MG/DL — SIGNIFICANT CHANGE UP (ref 1.6–2.6)
MCHC RBC-ENTMCNC: 30.8 GM/DL — LOW (ref 32–36)
MCHC RBC-ENTMCNC: 31.3 PG — SIGNIFICANT CHANGE UP (ref 27–34)
MCV RBC AUTO: 101.5 FL — HIGH (ref 80–100)
PHOSPHATE SERPL-MCNC: 3.9 MG/DL — SIGNIFICANT CHANGE UP (ref 2.5–4.5)
PLATELET # BLD AUTO: 263 K/UL — SIGNIFICANT CHANGE UP (ref 150–400)
POTASSIUM SERPL-MCNC: 3.9 MMOL/L — SIGNIFICANT CHANGE UP (ref 3.5–5.3)
POTASSIUM SERPL-SCNC: 3.9 MMOL/L — SIGNIFICANT CHANGE UP (ref 3.5–5.3)
PROT SERPL-MCNC: 6.2 GM/DL — SIGNIFICANT CHANGE UP (ref 6–8.3)
RBC # BLD: 2.72 M/UL — LOW (ref 4.2–5.8)
RBC # FLD: 17.3 % — HIGH (ref 10.3–14.5)
SODIUM SERPL-SCNC: 139 MMOL/L — SIGNIFICANT CHANGE UP (ref 135–145)
SPECIMEN SOURCE: SIGNIFICANT CHANGE UP
WBC # BLD: 10.36 K/UL — SIGNIFICANT CHANGE UP (ref 3.8–10.5)
WBC # FLD AUTO: 10.36 K/UL — SIGNIFICANT CHANGE UP (ref 3.8–10.5)

## 2023-05-30 PROCEDURE — 99233 SBSQ HOSP IP/OBS HIGH 50: CPT

## 2023-05-30 RX ADMIN — Medication 2000 UNIT(S): at 09:04

## 2023-05-30 RX ADMIN — Medication 100 MILLIGRAM(S): at 09:03

## 2023-05-30 RX ADMIN — Medication 5 MILLIGRAM(S): at 21:26

## 2023-05-30 RX ADMIN — MIDODRINE HYDROCHLORIDE 10 MILLIGRAM(S): 2.5 TABLET ORAL at 21:26

## 2023-05-30 RX ADMIN — HEPARIN SODIUM 5000 UNIT(S): 5000 INJECTION INTRAVENOUS; SUBCUTANEOUS at 09:02

## 2023-05-30 RX ADMIN — Medication 500 MILLIGRAM(S): at 09:04

## 2023-05-30 RX ADMIN — Medication 1 APPLICATION(S): at 09:06

## 2023-05-30 RX ADMIN — PANTOPRAZOLE SODIUM 40 MILLIGRAM(S): 20 TABLET, DELAYED RELEASE ORAL at 09:01

## 2023-05-30 RX ADMIN — CHLORHEXIDINE GLUCONATE 15 MILLILITER(S): 213 SOLUTION TOPICAL at 09:07

## 2023-05-30 RX ADMIN — CHLORHEXIDINE GLUCONATE 15 MILLILITER(S): 213 SOLUTION TOPICAL at 21:27

## 2023-05-30 RX ADMIN — CHLORHEXIDINE GLUCONATE 1 APPLICATION(S): 213 SOLUTION TOPICAL at 05:15

## 2023-05-30 RX ADMIN — NYSTATIN CREAM 1 APPLICATION(S): 100000 CREAM TOPICAL at 09:06

## 2023-05-30 RX ADMIN — AMIODARONE HYDROCHLORIDE 200 MILLIGRAM(S): 400 TABLET ORAL at 09:03

## 2023-05-30 RX ADMIN — QUETIAPINE FUMARATE 50 MILLIGRAM(S): 200 TABLET, FILM COATED ORAL at 21:26

## 2023-05-30 RX ADMIN — MIDODRINE HYDROCHLORIDE 10 MILLIGRAM(S): 2.5 TABLET ORAL at 14:38

## 2023-05-30 RX ADMIN — HEPARIN SODIUM 5000 UNIT(S): 5000 INJECTION INTRAVENOUS; SUBCUTANEOUS at 21:26

## 2023-05-30 RX ADMIN — MIDODRINE HYDROCHLORIDE 10 MILLIGRAM(S): 2.5 TABLET ORAL at 05:13

## 2023-05-30 RX ADMIN — NYSTATIN CREAM 1 APPLICATION(S): 100000 CREAM TOPICAL at 21:25

## 2023-05-30 RX ADMIN — CLOPIDOGREL BISULFATE 75 MILLIGRAM(S): 75 TABLET, FILM COATED ORAL at 09:02

## 2023-05-30 NOTE — PROGRESS NOTE ADULT - ASSESSMENT
78 yo male with hx of DM, HTN, HLD, s/p STEMI complicated by cardiogenic shock, BARBRA requiring dialysis, acute respiratory failure requiring trach/peg.  Transported from [place] to [place] at family request.  Found to have scrotal wounds/ pressure ulcers.  Grew pseudomonas and klebsiella in sputum, e coli and klebsiella aerogenes in urine.  Now off all abx.   Has bilat pigtail catheters for pleural effusions.    Plan  - continue DAPT, amio  - aggressive PT, out of bed to chair as tolerated  - son interested in trach capping trials  - continue tube feeds  - once chest tube drainage decreases then can remove and start looking for facility  - DVT/GI prophylaxis

## 2023-05-30 NOTE — PROGRESS NOTE ADULT - SUBJECTIVE AND OBJECTIVE BOX
Interval HPI:  No major events  still with significant drainage from chest tubes    Exam:  on trach mask, coughs up secretions  frail and cachectic  bilat air entry  reg rhythm  abd nontender  no edema    Radiology: cxr - bilat pigtail catheters present    On Admission  05-16-23 (14d)  HPI:  78 yo male with hx of DM, HTN, HLD, who presented initially to an outside hospital in [place], [place] on 3/1/23 with chest pain and lightheadedness after doing yard work, found to have an inferiolateral ST elevations.  He was taken to the cath lab and underwent PCI with MEDINA placement to mLCX (Resolute [place] 4 x 26mm), dLAD (Resolute [place] 2.5 x 15mm and 2.25 x 12mm), and Impella CP was placed.   Patient was cannulated for peripheral VA ECMO on 3/2 and decannulated on 3/9.  Impella was removed 3/10.  Hospital course was complicated by multiple watershed strokes, acute respiratory failure requiring tracheostomy, BARBRA requiring renal replacement therapy, upper GI bleed, massive hemoptysis originating from lingula requiring bronchial blocker and bronchial artery embolization, pseudomonas bacteremia and septic shock.    Patient was eventually transported from [facility] on 5/16 to [facility] CCU with a tracheostomy, PEG, tunneled right sided HD catheter, right sided PICC line, rectal tube, and ostomy bag over his penis for urine collection.     (16 May 2023 16:49)    PAST MEDICAL & SURGICAL HISTORY:  STEMI (ST elevation myocardial infarction)      Cardiogenic shock      Stroke      History of chronic respiratory failure          Antimicrobial:    Cardiovascular:  aMIOdarone    Tablet 200 milliGRAM(s) Oral daily  midodrine 10 milliGRAM(s) Oral every 8 hours    Pulmonary:    Hematalogic:  clopidogrel Tablet 75 milliGRAM(s) Enteral Tube daily  heparin   Injectable 5000 Unit(s) SubCutaneous every 12 hours    Other:  ascorbic acid 500 milliGRAM(s) Oral daily  cadexomer iodine 0.9% Gel 1 Application(s) Topical daily  chlorhexidine 0.12% Liquid 15 milliLiter(s) Oral Mucosa every 12 hours  chlorhexidine 4% Liquid 1 Application(s) Topical <User Schedule>  cholecalciferol 2000 Unit(s) Oral daily  collagenase Ointment 1 Application(s) Topical daily  melatonin 5 milliGRAM(s) Oral at bedtime  nystatin Powder 1 Application(s) Topical two times a day  pantoprazole  Injectable 40 milliGRAM(s) IV Push daily  QUEtiapine 50 milliGRAM(s) Oral at bedtime  thiamine 100 milliGRAM(s) Oral daily      Drug Dosing Weight  Height (cm): 165.1 (17 May 2023 20:00)  Weight (kg): 53.2 (16 May 2023 16:35)  BMI (kg/m2): 19.5 (17 May 2023 20:00)  BSA (m2): 1.58 (17 May 2023 20:00)    T(C): 36.2 (05-30-23 @ 08:35), Max: 36.3 (05-29-23 @ 22:43)  HR: 96 (05-30-23 @ 14:00)  BP: 96/48 (05-30-23 @ 14:00)  BP(mean): 60 (05-30-23 @ 14:00)  ABP: --  ABP(mean): --  RR: 20 (05-30-23 @ 14:00)  SpO2: 100% (05-30-23 @ 13:00)          05-29 @ 07:01  -  05-30 @ 07:00  --------------------------------------------------------  IN: 880 mL / OUT: 1430 mL / NET: -550 mL              LABS:  CBC Full  -  ( 30 May 2023 09:11 )  WBC Count : 10.36 K/uL  RBC Count : 2.72 M/uL  Hemoglobin : 8.5 g/dL  Hematocrit : 27.6 %  Platelet Count - Automated : 263 K/uL  Mean Cell Volume : 101.5 fl  Mean Cell Hemoglobin : 31.3 pg  Mean Cell Hemoglobin Concentration : 30.8 gm/dL  Auto Neutrophil # : x  Auto Lymphocyte # : x  Auto Monocyte # : x  Auto Eosinophil # : x  Auto Basophil # : x  Auto Neutrophil % : x  Auto Lymphocyte % : x  Auto Monocyte % : x  Auto Eosinophil % : x  Auto Basophil % : x    05-30    139  |  104  |  31<H>  ----------------------------<  150<H>  3.9   |  30  |  0.53    Ca    9.8      30 May 2023 09:11  Phos  3.9     05-30  Mg     1.9     05-30    TPro  6.2  /  Alb  2.3<L>  /  TBili  0.4  /  DBili  x   /  AST  59<H>  /  ALT  60  /  AlkPhos  184<H>  05-30          ____________________________________________________________________________________________________

## 2023-05-30 NOTE — PROGRESS NOTE ADULT - SUBJECTIVE AND OBJECTIVE BOX
Date of Service: 5/30/23  HPI: 76 yo male with hx of DM, HTN, HLD, who presented initially to an outside hospital in [place], [place] on 3/1/23 with chest pain and lightheadedness after doing yard work, found to have an inferiolateral ST elevations.  He was taken to the cath lab and underwent PCI with MEDINA placement to mLCX (Resolute [place] 4 x 26mm), dLAD (Resolute [place] 2.5 x 15mm and 2.25 x 12mm), and Impella CP was placed.   Patient was cannulated for peripheral VA ECMO on 3/2 and decannulated on 3/9.  Impella was removed 3/10.  Hospital course was complicated by multiple watershed strokes, acute respiratory failure requiring tracheostomy, BARBRA requiring renal replacement therapy, upper GI bleed, massive hemoptysis originating from lingula requiring bronchial blocker and bronchial artery embolization, pseudomonas bacteremia and septic shock.    Patient was eventually transported from [facility] on 5/16 to [facility] CCU with a tracheostomy, PEG, tunneled right sided HD catheter, right sided PICC line, rectal tube, and ostomy bag over his penis for urine collection.     (16 May 2023 16:49)    Podiatry consulted for R foot evaluation. Pt at bedside, non verbal, family present. Per family, they report wounds being present for approx 3-4wks. Family admits to noticing yellow drainage from R big toe. Pt no aware pt missing R big toe nail. No additional pedal complaints reported at this time.     5/30/23: Pt seen by Podiatry team for follow up of R foot wounds. Pt resting comfortably at bedside, on vent support.     PMH: STEMI (ST elevation myocardial infarction)    Cardiogenic shock    Stroke    History of chronic respiratory failure      PSH:    Allergies:No Known Allergies      Labs:                        8.5    10.36 )-----------( 263      ( 30 May 2023 09:11 )             27.6     05-30    139  |  104  |  31<H>  ----------------------------<  150<H>  3.9   |  30  |  0.53    Ca    9.8      30 May 2023 09:11  Phos  3.9     05-30  Mg     1.9     05-30    TPro  6.2  /  Alb  2.3<L>  /  TBili  0.4  /  DBili  x   /  AST  59<H>  /  ALT  60  /  AlkPhos  184<H>  05-30               MEDICATIONS  (STANDING):  aMIOdarone    Tablet 200 milliGRAM(s) Oral daily  ascorbic acid 500 milliGRAM(s) Oral daily  cadexomer iodine 0.9% Gel 1 Application(s) Topical daily  chlorhexidine 0.12% Liquid 15 milliLiter(s) Oral Mucosa every 12 hours  chlorhexidine 4% Liquid 1 Application(s) Topical <User Schedule>  cholecalciferol 2000 Unit(s) Oral daily  clopidogrel Tablet 75 milliGRAM(s) Enteral Tube daily  collagenase Ointment 1 Application(s) Topical daily  heparin   Injectable 5000 Unit(s) SubCutaneous every 12 hours  melatonin 5 milliGRAM(s) Oral at bedtime  midodrine 10 milliGRAM(s) Oral every 8 hours  norepinephrine Infusion 0.05 MICROgram(s)/kG/Min (4.99 mL/Hr) IV Continuous <Continuous>  nystatin Powder 1 Application(s) Topical two times a day  pantoprazole  Injectable 40 milliGRAM(s) IV Push daily  QUEtiapine 50 milliGRAM(s) Oral at bedtime  thiamine 100 milliGRAM(s) Oral daily    MEDICATIONS  (PRN):          Physical Exam:   Constitutional: NAD, alert;  Derm:  Skin warm, dry and supple bilateral.    Wound to nail bed of dorsal Left hallux with absent nail, superficial in nature, no hyperkeratotic border, wound base fibrogranular, wound size (approx 2.8 cm X 2cm), no edema, no purulence, no fluctuance, no tracking/tunneling, no probe to bone, mild sanguinous discharge distal to eponychium, eschar noted to medial distal hallux and at eponychium.   Pressure stage 2 wound to Right heel, no PTB, no crepitus, no fluctuance, superficial in nature with pink wound bed.   Vascular: Dorsalis Pedis and Posterior Tibial pulses non palpable.  Capillary refill delayed bilateral.    Neuro: Unable to access due to pt condition  MSK: Unable to access due to pt condition        < from: Xray Foot AP + Lateral + Oblique, Right (05.17.23 @ 15:36) >  INTERPRETATION:  RIGHT foot    CLINICAL INFORMATION: Hallux and heel wound TECHNIQUE: AP,lateral and   oblique views.    FINDINGS: Heel soft tissue subcutaneous air adjacent to intact calcaneal   tuberosity.  RIGHT hallux are radiographically intact.  Second and third hammertoe deformities.  Remaining osseous and joint structures of the RIGHT foot are   radiographicallyintact.  The bones and joint spaces are radiographically intact.  No fracture or dislocation.  Soft tissues unremarkable.    IMPRESSION:    Heel subcutaneous air without radiographic evidence of calcaneal   tuberosity osteolysis/osteomyelitis.  Firsthallux radiographically intact..  If osteomyelitis is clinically considered  despite conservative therapy,   and soft tissue / bone infection requires further assessment, follow-up   MRI recommended.    --- End of Report ---    < end of copied text >

## 2023-05-30 NOTE — PROGRESS NOTE ADULT - ASSESSMENT
Assessment: 78yo Male seen for the following:  -Partial thickness wound to R hallux  -Partial thickness wound to R heel  -Diabetes Mellitus type 2  -Difficulty with ambulation    Plan:   -Chart reviewed and Patient evaluated;  -Discussed diagnosis and treatment with patient.  -Xrays of R foot reviewed: on wet read showing no acute cortical demineralization, noted subtle radiolucency by subcutaneous tissue of posterior heel.   -Wound flush with normal saline. Official impression noted above.   -Applied betadine to Right hallux and xeroform to R heel with DSD  -Wound to Right hallux is superficial in nature with scant sanguinous discharge on evaluation and surrounding eschar tissue, no pus/purulence, no PTB, stable at this time.   Wound to Right heel is superficial in nature with pink wound bed, no fluctuance, no crepitus, no PTB, stable at this time.   -Wound Care: idosorb, xeroform and DSD  -Offloading of bilateral heels with CAIR boots or while bedbound to reduce further tissue damage   -5/18- on xray noted subtle air by subcutaneous tissue at posterior heel. Re-assessed heel wound at bedside today evening. No fluctuance, no crepitus, no PTB. The subcutaneous air noted on xray is air from wound.   -Will continue to monitor wounds and continue local wound care at this time.   -Continue with IV antibiotics As Per ID  -All additional care per Med appreciated  -Patient tolerated interventions well without any complications.   -Podiatry will follow while in house.

## 2023-05-31 PROCEDURE — 99233 SBSQ HOSP IP/OBS HIGH 50: CPT

## 2023-05-31 RX ADMIN — Medication 2000 UNIT(S): at 09:33

## 2023-05-31 RX ADMIN — NYSTATIN CREAM 1 APPLICATION(S): 100000 CREAM TOPICAL at 22:20

## 2023-05-31 RX ADMIN — HEPARIN SODIUM 5000 UNIT(S): 5000 INJECTION INTRAVENOUS; SUBCUTANEOUS at 09:32

## 2023-05-31 RX ADMIN — HEPARIN SODIUM 5000 UNIT(S): 5000 INJECTION INTRAVENOUS; SUBCUTANEOUS at 22:06

## 2023-05-31 RX ADMIN — QUETIAPINE FUMARATE 50 MILLIGRAM(S): 200 TABLET, FILM COATED ORAL at 22:06

## 2023-05-31 RX ADMIN — MIDODRINE HYDROCHLORIDE 10 MILLIGRAM(S): 2.5 TABLET ORAL at 22:05

## 2023-05-31 RX ADMIN — CHLORHEXIDINE GLUCONATE 15 MILLILITER(S): 213 SOLUTION TOPICAL at 09:32

## 2023-05-31 RX ADMIN — CHLORHEXIDINE GLUCONATE 15 MILLILITER(S): 213 SOLUTION TOPICAL at 22:07

## 2023-05-31 RX ADMIN — Medication 1 APPLICATION(S): at 13:55

## 2023-05-31 RX ADMIN — Medication 1 APPLICATION(S): at 09:41

## 2023-05-31 RX ADMIN — Medication 500 MILLIGRAM(S): at 09:31

## 2023-05-31 RX ADMIN — AMIODARONE HYDROCHLORIDE 200 MILLIGRAM(S): 400 TABLET ORAL at 09:31

## 2023-05-31 RX ADMIN — MIDODRINE HYDROCHLORIDE 10 MILLIGRAM(S): 2.5 TABLET ORAL at 05:22

## 2023-05-31 RX ADMIN — MIDODRINE HYDROCHLORIDE 10 MILLIGRAM(S): 2.5 TABLET ORAL at 13:58

## 2023-05-31 RX ADMIN — Medication 5 MILLIGRAM(S): at 22:06

## 2023-05-31 RX ADMIN — PANTOPRAZOLE SODIUM 40 MILLIGRAM(S): 20 TABLET, DELAYED RELEASE ORAL at 09:32

## 2023-05-31 RX ADMIN — CLOPIDOGREL BISULFATE 75 MILLIGRAM(S): 75 TABLET, FILM COATED ORAL at 09:31

## 2023-05-31 RX ADMIN — CHLORHEXIDINE GLUCONATE 1 APPLICATION(S): 213 SOLUTION TOPICAL at 09:33

## 2023-05-31 RX ADMIN — Medication 100 MILLIGRAM(S): at 09:32

## 2023-05-31 RX ADMIN — NYSTATIN CREAM 1 APPLICATION(S): 100000 CREAM TOPICAL at 09:41

## 2023-05-31 NOTE — PROGRESS NOTE ADULT - ASSESSMENT
76 yo male with hx of DM, HTN, HLD, s/p STEMI complicated by cardiogenic shock, BARBRA requiring dialysis, acute respiratory failure requiring trach/peg.  Transported from [place] to [place] at family request.  Found to have scrotal wounds/ pressure ulcers.  Grew pseudomonas and klebsiella in sputum,  nothing in blood.   had drainage of bilateral effusions    1. s/p MI with cardiogentic shock, last echo ef 65%,   2. renal failure improved  3. GI tolerating tube feeds/s/p PEG  4. scrotal infection  5. chronic respiratory failure bilateral effusions, s/p bilateral pigtails    Plan  CCU    PULM:   s/p drainage of pigtails, will hopefully be able to d/c tubes soon,       will  change to cuffless , fenestrated trach  Cardio: Hypotensive for septic shock, off Levophed, continue  midodrine,     Renal:   BARBRA with Hyperkalemia.  improved  kidney function now normla    GI: Continue Feeds, PPI  tolerating po    ID: completed curse of cefepime , repeat sputum culture negative,      SQH DVT Prophylaxis

## 2023-05-31 NOTE — PROCEDURE NOTE - ADDITIONAL PROCEDURE DETAILS
Dx/indication: Resp failure, pleural effusion  Procedure:    Static US used  Large pocket  1st stick, no complications  Fluid sent for analysis
trach changed to number 6 cuffless fenestrated no complications
clamped at 1L . pleural studies, culture and cytology sent

## 2023-05-31 NOTE — PROGRESS NOTE ADULT - SUBJECTIVE AND OBJECTIVE BOX
CCU Progress Note    HPI:    S:    Pt seen and examined  HD # 16  DNR  PMHx Dm2, HTN, HLD transferred from [facility] after protracted hospital course for STEMI s/p MEDINA to mLCX and dLAD requiring impella complicated by cardiogenic shock requiring VA ECMO, respiratory failure requiring trach, BARBRA requiring HD, UGIB, massive hemoptysis originating from lingula s/p bronchial blocker and bronchial artery emobolization, pseudomonas bacteremia and septic shock.  Ultimately improved, minimal vent settings, no longer requiring HD, HD stable and was air transported to  with tentative plan for facility transfer.      5/25: s/p drainage Rt chest for large pleural effusion yesterday; drain Lt side today. Remains on vent.   5/26: s/p drainage B/L chest for large pleural effusions. Some improvement in respiratory status but remains on ventilator.  5/28: On ventilator with trails of PSV and TC. pleural drains still with relatively high output.   6/1: Rt chest tube d/c'd; post pull CXR w/o PTX,.    ROS: Unable to verbalize (on vent)    Allergies    No Known Allergies    Intolerances    MEDICATIONS  (STANDING):    aMIOdarone    Tablet 200 milliGRAM(s) Oral daily  ascorbic acid 500 milliGRAM(s) Oral daily  cadexomer iodine 0.9% Gel 1 Application(s) Topical daily  chlorhexidine 0.12% Liquid 15 milliLiter(s) Oral Mucosa every 12 hours  chlorhexidine 4% Liquid 1 Application(s) Topical <User Schedule>  cholecalciferol 2000 Unit(s) Oral daily  clopidogrel Tablet 75 milliGRAM(s) Enteral Tube daily  collagenase Ointment 1 Application(s) Topical daily  heparin   Injectable 5000 Unit(s) SubCutaneous every 12 hours  melatonin 5 milliGRAM(s) Oral at bedtime  midodrine 10 milliGRAM(s) Oral every 8 hours  nystatin Powder 1 Application(s) Topical two times a day  pantoprazole  Injectable 40 milliGRAM(s) IV Push daily  QUEtiapine 50 milliGRAM(s) Oral at bedtime  thiamine 100 milliGRAM(s) Oral daily    MEDICATIONS  (PRN):    Drug Dosing Weight    Height (cm): 165.1 (17 May 2023 20:00)  Weight (kg): 53.2 (16 May 2023 16:35)  BMI (kg/m2): 19.5 (17 May 2023 20:00)  BSA (m2): 1.58 (17 May 2023 20:00)    PAST MEDICAL & SURGICAL HISTORY:    STEMI (ST elevation myocardial infarction)  Cardiogenic shock  Stroke  History of chronic respiratory failure    FAMILY HISTORY:    ROS: See HPI; otherwise, all systems reviewed and negative.    O:    ICU Vital Signs Last 24 Hrs  T(C): 37.1 (01 Jun 2023 04:54), Max: 37.2 (31 May 2023 09:21)  T(F): 98.7 (01 Jun 2023 04:54), Max: 98.9 (31 May 2023 09:21)  HR: 106 (01 Jun 2023 06:00) (91 - 108)  BP: 93/51 (01 Jun 2023 06:00) (41/12 - 116/57)  BP(mean): 61 (01 Jun 2023 06:00) (16 - 72)  ABP: --  ABP(mean): --  RR: 28 (01 Jun 2023 06:00) (17 - 33)  SpO2: 100% (01 Jun 2023 06:00) (98% - 100%)    O2 Parameters below as of 31 May 2023 18:00  Patient On (Oxygen Delivery Method): tracheostomy collar    O2 Concentration (%): 40    I&O's Detail    31 May 2023 07:01  -  01 Jun 2023 07:00  --------------------------------------------------------  IN:    Enteral Tube Flush: 405 mL    Free Water: 1200 mL    Nepro with Carb Steady: 1100 mL  Total IN: 2705 mL    OUT:    Chest Tube (mL): 210 mL    Chest Tube (mL): 190 mL    Indwelling Catheter - Urethral (mL): 400 mL  Total OUT: 800 mL    Total NET: 1905 mL    PE:    Adult M lying in bed  Appears emaciated and chronically ill  No JVD + trach in place  CTA B/L  S1S2+  Abd soft NTND  0-1+ edema B/L LE  Awake, more alert, remains weak  Skin pink, warm  + Lt chest tube in place    LABS:    CBC Full  -  ( 30 May 2023 09:11 )  WBC Count : 10.36 K/uL  RBC Count : 2.72 M/uL  Hemoglobin : 8.5 g/dL  Hematocrit : 27.6 %  Platelet Count - Automated : 263 K/uL  Mean Cell Volume : 101.5 fl  Mean Cell Hemoglobin : 31.3 pg  Mean Cell Hemoglobin Concentration : 30.8 gm/dL  Auto Neutrophil # : x  Auto Lymphocyte # : x  Auto Monocyte # : x  Auto Eosinophil # : x  Auto Basophil # : x  Auto Neutrophil % : x  Auto Lymphocyte % : x  Auto Monocyte % : x  Auto Eosinophil % : x  Auto Basophil % : x    05-30    139  |  104  |  31<H>  ----------------------------<  150<H>  3.9   |  30  |  0.53    Ca    9.8      30 May 2023 09:11  Phos  3.9     05-30  Mg     1.9     05-30    TPro  6.2  /  Alb  2.3<L>  /  TBili  0.4  /  DBili  x   /  AST  59<H>  /  ALT  60  /  AlkPhos  184<H>  05-30        CAPILLARY BLOOD GLUCOSE            LIVER FUNCTIONS - ( 30 May 2023 09:11 )  Alb: 2.3 g/dL / Pro: 6.2 gm/dL / ALK PHOS: 184 U/L / ALT: 60 U/L / AST: 59 U/L / GGT: x                CCU Progress Note    HPI:    S:    Pt seen and examined  HD # 15  DNR  PMHx Dm2, HTN, HLD transferred from [facility] after protracted hospital course for STEMI s/p MEDINA to mLCX and dLAD requiring impella complicated by cardiogenic shock requiring VA ECMO, respiratory failure requiring trach, BARBRA requiring HD, UGIB, massive hemoptysis originating from lingula s/p bronchial blocker and bronchial artery emobolization, pseudomonas bacteremia and septic shock.  Ultimately improved, minimal vent settings, no longer requiring HD, HD stable and was air transported to  with tentative plan for facility transfer.      5/25: s/p drainage Rt chest for large pleural effusion yesterday; drain Lt side today. Remains on vent.   5/26: s/p drainage B/L chest for large pleural effusions. Some improvement in respiratory status but remains on ventilator.  5/28: On ventilator with trails of PSV and TC. pleural drains still with relatively high output.   5/31: Changed from non fenestrated to fenestrated trach today, pressors weaned to off    ROS: Unable to verbalize (on vent)    Allergies    No Known Allergies    Intolerances    MEDICATIONS  (STANDING):    aMIOdarone    Tablet 200 milliGRAM(s) Oral daily  ascorbic acid 500 milliGRAM(s) Oral daily  cadexomer iodine 0.9% Gel 1 Application(s) Topical daily  chlorhexidine 0.12% Liquid 15 milliLiter(s) Oral Mucosa every 12 hours  chlorhexidine 4% Liquid 1 Application(s) Topical <User Schedule>  cholecalciferol 2000 Unit(s) Oral daily  clopidogrel Tablet 75 milliGRAM(s) Enteral Tube daily  collagenase Ointment 1 Application(s) Topical daily  heparin   Injectable 5000 Unit(s) SubCutaneous every 12 hours  melatonin 5 milliGRAM(s) Oral at bedtime  midodrine 10 milliGRAM(s) Oral every 8 hours  nystatin Powder 1 Application(s) Topical two times a day  pantoprazole  Injectable 40 milliGRAM(s) IV Push daily  QUEtiapine 50 milliGRAM(s) Oral at bedtime  thiamine 100 milliGRAM(s) Oral daily    MEDICATIONS  (PRN):    Drug Dosing Weight    Height (cm): 165.1 (17 May 2023 20:00)  Weight (kg): 53.2 (16 May 2023 16:35)  BMI (kg/m2): 19.5 (17 May 2023 20:00)  BSA (m2): 1.58 (17 May 2023 20:00)    PAST MEDICAL & SURGICAL HISTORY:    STEMI (ST elevation myocardial infarction)  Cardiogenic shock  Stroke  History of chronic respiratory failure    FAMILY HISTORY:    ROS: See HPI; otherwise, all systems reviewed and negative.    O:    ICU Vital Signs Last 24 Hrs  T(C): 37.1 (01 Jun 2023 04:54), Max: 37.2 (31 May 2023 09:21)  T(F): 98.7 (01 Jun 2023 04:54), Max: 98.9 (31 May 2023 09:21)  HR: 106 (01 Jun 2023 06:00) (91 - 108)  BP: 93/51 (01 Jun 2023 06:00) (41/12 - 116/57)  BP(mean): 61 (01 Jun 2023 06:00) (16 - 72)  ABP: --  ABP(mean): --  RR: 28 (01 Jun 2023 06:00) (17 - 33)  SpO2: 100% (01 Jun 2023 06:00) (98% - 100%)    O2 Parameters below as of 31 May 2023 18:00  Patient On (Oxygen Delivery Method): tracheostomy collar    O2 Concentration (%): 40    I&O's Detail    31 May 2023 07:01  -  01 Jun 2023 07:00  --------------------------------------------------------  IN:    Enteral Tube Flush: 405 mL    Free Water: 1200 mL    Nepro with Carb Steady: 1100 mL  Total IN: 2705 mL    OUT:    Chest Tube (mL): 210 mL    Chest Tube (mL): 190 mL    Indwelling Catheter - Urethral (mL): 400 mL  Total OUT: 800 mL    Total NET: 1905 mL    PE:    Adult M lying in bed  Appears emaciated and chronically ill  No JVD + trach in place  CTA B/L  S1S2+  Abd soft NTND  0-1+ edema B/L LE  Awake, more alert, remains weak  Skin pink, warm  + B/L chest tube in place    LABS:    CBC Full  -  ( 30 May 2023 09:11 )  WBC Count : 10.36 K/uL  RBC Count : 2.72 M/uL  Hemoglobin : 8.5 g/dL  Hematocrit : 27.6 %  Platelet Count - Automated : 263 K/uL  Mean Cell Volume : 101.5 fl  Mean Cell Hemoglobin : 31.3 pg  Mean Cell Hemoglobin Concentration : 30.8 gm/dL  Auto Neutrophil # : x  Auto Lymphocyte # : x  Auto Monocyte # : x  Auto Eosinophil # : x  Auto Basophil # : x  Auto Neutrophil % : x  Auto Lymphocyte % : x  Auto Monocyte % : x  Auto Eosinophil % : x  Auto Basophil % : x    05-30    139  |  104  |  31<H>  ----------------------------<  150<H>  3.9   |  30  |  0.53    Ca    9.8      30 May 2023 09:11  Phos  3.9     05-30  Mg     1.9     05-30    TPro  6.2  /  Alb  2.3<L>  /  TBili  0.4  /  DBili  x   /  AST  59<H>  /  ALT  60  /  AlkPhos  184<H>  05-30        CAPILLARY BLOOD GLUCOSE            LIVER FUNCTIONS - ( 30 May 2023 09:11 )  Alb: 2.3 g/dL / Pro: 6.2 gm/dL / ALK PHOS: 184 U/L / ALT: 60 U/L / AST: 59 U/L / GGT: x

## 2023-05-31 NOTE — PROGRESS NOTE ADULT - ASSESSMENT
MEDICARE WELLNESS VISIT + NOTE    CHIEF COMPLAINT:  Sage Ortiz presents for his Subsequent Annual Medicare Wellness Visit.   His additional complaints or concerns are addressed below.      Patient Care Team:  Hamlet Ferguson MD as PCP - General (Family Practice)  Jose Montemayor DO (Cardiovascular Disease)  Kin Olivier MD (Internal Medicine - Pulmonary Disease)  Moustapha Garalnd MD (Internal Medicine)        Patient Active Problem List   Diagnosis   • Aortic atherosclerosis (CMS/HCC)   • Colon polyp   • Hyperlipidemia   • IRVIN (obstructive sleep apnea)   • Class 1 obesity with serious comorbidity and body mass index (BMI) of 34.0 to 34.9 in adult   • Essential hypertension   • Former smoker   • Prediabetes   • CAD (coronary artery disease)   • Axillary lymphadenopathy   • Other emphysema (CMS/HCC)   • Abnormal CT of the chest   • Left hip pain   • Dependence on other enabling machines and devices   • Urinary dribbling   • Abdominal bloating         Past Medical History:   Diagnosis Date   • Abdominal distension 12/24/2020   • Aortic atherosclerosis    • CAD    • COPD 06/30/2020   • Former smoker    • HLD    • HTN    • Obesity    • IRVIN    • Prediabetes          Past Surgical History:   Procedure Laterality Date   • Chest tube insertion     • Colonoscopy diagnostic      1-2022, repeat in 5 years per note.   • Wrist fracture surgery           Social History     Tobacco Use   • Smoking status: Former Smoker   • Smokeless tobacco: Never Used   • Tobacco comment: quit 15 years ago, smoked for 20 yars.    Substance Use Topics   • Alcohol use: Not Currently     Comment: very rarely   • Drug use: Never     Family History   Problem Relation Age of Onset   • Hypertension Mother    • Diabetes Mother    • Hypothyroid Mother    • Hypertension Father    • Diabetes Father          Current Outpatient Medications   Medication Sig Dispense Refill   • diphtheria-pertussis, acellular,-tetanus (BOOSTRIX) 5-2.5-18.5 LF-MCG/0.5  Suspension Prefilled Syringe injection Inject 0.5 mLs into the muscle 1 time. 0.5 mL 0   • tiotropium-olodaterol (Stiolto Respimat) 2.5-2.5 MCG/ACT inhaler every 24 hours.     • chlorthalidone (THALITONE) 25 MG tablet every 24 hours.     • simvastatin (ZOCOR) 40 MG tablet every 24 hours.     • atorvastatin (LIPITOR) 40 MG tablet every 24 hours.     • Umeclidinium-Vilanterol (ANORO ELLIPTA IN)      • atorvastatin (LIPITOR) 40 MG tablet Take 1 tablet by mouth daily. 90 tablet 3   • amLODIPine (NORVASC) 10 MG tablet Take 1 tablet by mouth daily. 90 tablet 3   • fluticasone (FLONASE) 50 MCG/ACT nasal spray Spray 1 spray in each nostril daily. 16 g 11   • Spiriva Respimat 2.5 MCG/ACT inhaler      • aspirin 81 MG EC tablet Take 1 tablet by mouth daily.       No current facility-administered medications for this visit.        The following items on the Medicare Health Risk Assessment were found to be positive  6 b.) How many servings of High Fiber / Whole Grain Foods to you have each day ( 1 serving = 1 cup cold cereal, 1/2 cup cooked cereal, 1 slice bread): 1 per day         Vision and Hearing screens: Both screenings were offered and patient declined    Advance Directive:   The patient has the following documents:  No Advance Directives on file. Patient offered documents.    Cognitive/Functional Status: Mini-Cog performed with score of 4    Opioid Review: Sage is not taking opioid medications.    Recent PHQ 2/9 Score:    PHQ 2:  Date Adult PHQ 2 Score Adult PHQ 2 Interpretation   5/29/2022 0 No further screening needed       PHQ 9:       DEPRESSION ASSESSMENT/PLAN:  Depression screening is negative no further plan needed.     Body mass index is 34.69 kg/m².    BMI ASSESSMENT/PLAN:  Patient is obese.    20 minutes of physical activity a day and Low carbohydrate diet        See Patient Instructions section.   Return in about 1 year (around 5/31/2023) for Medicare Wellness Visit.      OUTPATIENT PROGRESS  A:    77M  HD # 16  DNR    Here for:    1. Shock, septic, hypovolemic 2/2  2. PNA  3. Acute + chronic resp failure  4. Dysphagia s/p PEG  5. Anemia  6. Scrotal cellulitis  7. pleural effusions s/p drainage    P:    Pt now off vent and pressors  However, complex case with multiple complex pathologies being actively managed    Rt CT removed as less output; post pull CXR OK  Plan to d/c Lt pigtail tomorrow  TC as tolerated, work to capping and PO meds; now with fenestrated trach, non fenestrated (w/balloon) trach and inner cannula in room  Levophed titrated to off; midodrine 10mg PO q8h  Seroquel qhs for sleep hygiene agitation  Amiodarone to maintain SR  s/p Cefepime/merrem  Per , no surgical intervention for scrotum  Plavix  Puree + thickened liquids  VTE PUD ppx  f/u Cx of pleural fluids, sputum, blood; all Cxs neg thus far  Imaging reviewed  Wound care  No s/s active bleeding, give pRBC for Hgb < 7    Dispo: Cont care of this complex Pt.     NOTE    Subjective   Chief Complaint Office Visit and Medicare Wellness Visit    Patient reports that he used the debrox to great success and all his symptoms have resolved.  \"I got a ton of gross stuff out.\"    No new reports of gurgling or hip pain.  No change in urination issues- urine sample already provided today    Patient continues to work with Dr. Olivier and trust in his care.  There is a part of him that is growing weary that there is something else wrong with his lungs given his history and ultimate need for surgical intervention.  No acute symptomatic changes.    Child was just admitted and discharged from MICU at [facility] for DKA/pancreatitis- things overall well.           Medications  Medications were reviewed and updated today.    Histories  I have personally reviewed and updated the patient's past medical, past surgical, family and social histories during today's visit.    ROS   Per HPI      Objective   Visit Vitals  /75 (BP Location: RUE - Right upper extremity, Patient Position: Sitting, Cuff Size: Large Adult)   Pulse 70   Temp 96.7 °F (35.9 °C) (Temporal)   Resp 20   Ht 5' 10\" (1.778 m)   Wt 109.6 kg (241 lb 11.8 oz)   BMI 34.69 kg/m²     Physical Exam  Vitals and nursing note reviewed.   Constitutional:       General: He is not in acute distress.     Appearance: Normal appearance. He is obese. He is not ill-appearing.   HENT:      Head: Normocephalic and atraumatic.      Right Ear: Tympanic membrane, ear canal and external ear normal.      Left Ear: Tympanic membrane, ear canal and external ear normal.   Pulmonary:      Effort: Pulmonary effort is normal. No respiratory distress.   Neurological:      General: No focal deficit present.      Mental Status: He is alert.      Gait: Gait normal.   Psychiatric:         Mood and Affect: Mood normal.         Behavior: Behavior normal.         Thought Content: Thought content normal.         Laboratory  I have reviewed the pertinent laboratory tests.  These are the pertinent findings:  · n/a    Imaging  I have reviewed the pertinent imaging study reports. These are the pertinent findings:  · n/a    Assessment & Plan   Diagnoses and associated orders for this visit:  1. Medicare annual wellness visit, subsequent  -     ANNUAL WELLNESS VISIT SUBSEQUENT VISIT W PPS  2. Screening for prostate cancer  -     PSA  3. Need for vaccination  -     Tetanus-Diphth-Acell Pertussis  4. Mixed hyperlipidemia  Assessment & Plan:  Labs per orders  CPM  Orders:  -     Lipid Panel With Reflex  -     Comprehensive Metabolic Panel  5. Prediabetes  Assessment & Plan:  Reviewed healthy lifestyle changes  Labs per orders  Orders:  -     Glycohemoglobin  -     Comprehensive Metabolic Panel  6. Essential hypertension  Assessment & Plan:  Improved control today  Labs per orders  Appreciate cardiology recs  7. Other emphysema (CMS/HCC)  Assessment & Plan:  Appreciated management by Dr. Olivier  Attempting to different modalities  Appreciated patient's concerns  Will f/u, can consider second opinion given concern  8. Bilateral impacted cerumen  Comments:  Resolved with debrox     A:    77M  HD # 16  DNR    Here for:    1. Shock, septic, hypovolemic 2/2  2. PNA  3. Acute + chronic resp failure  4. Dysphagia s/p PEG  5. Anemia  6. Scrotal cellulitis  7. pleural effusions s/p drainage    P:    Pt now off vent and pressors  However, complex case with multiple complex pathologies being actively managed    Plan to d/c Lt pigtails in next 1-2 days  TC as tolerated, work to capping and PO meds; now with fenestrated trach, non fenestrated (w/balloon) trach and inner cannula in room  Levophed titrated to off; midodrine 10mg PO q8h  Seroquel qhs for sleep hygiene agitation  Amiodarone to maintain SR  s/p Cefepime/merrem  Per , no surgical intervention for scrotum  Plavix  Puree + thickened liquids  VTE PUD ppx  f/u Cx of pleural fluids, sputum, blood; all Cxs neg thus far  Imaging reviewed  Wound care  No s/s active bleeding, give pRBC for Hgb < 7    Dispo: Cont care of this complex Pt.

## 2023-05-31 NOTE — PROGRESS NOTE ADULT - SUBJECTIVE AND OBJECTIVE BOX
Events Overnight: Patient remains on trach collar, no fevers, CT draining 300 bilaterally    HPI:      76 y/o male with PMH HTN, HLD, DM2, recent prolonged hospital course in [place], [place] after a IWMI (underwent PCI with [facility] to LCx) complicated by cardiogenic shock requiring ECMO, acute hypoxic resp failure requiring trach, dysphagia requiring PEG, BARBRA requiring dialysis, pA.fib, acute CVA, septic shock from Pseudomonas bacteremia, hemoptysis, GI bleed   Now transferred to  for further care.  off pressors, bp ok,   On trach collar.  Dialysis catheter removed was removed  on cefepime for Klebsiella  pseudomonas in sputum carbamazepam resistant  deteriorated on 5/23 pm CT Chest showing Large B/L Effusions, got bilateral pigtails  possible sepsis,was  on Levophed,  completed course of cefepime  has right heel foot wound now clean  scrotal wound improved     PAST MEDICAL & SURGICAL HISTORY:  STEMI (ST elevation myocardial infarction)  Cardiogenic shock  Stroke  History of chronic respiratory failure    ROS cant obtain due to trach    MEDICATIONS  (STANDING):  aMIOdarone    Tablet 200 milliGRAM(s) Oral daily  ascorbic acid 500 milliGRAM(s) Oral daily  cadexomer iodine 0.9% Gel 1 Application(s) Topical daily  chlorhexidine 0.12% Liquid 15 milliLiter(s) Oral Mucosa every 12 hours  chlorhexidine 4% Liquid 1 Application(s) Topical <User Schedule>  cholecalciferol 2000 Unit(s) Oral daily  clopidogrel Tablet 75 milliGRAM(s) Enteral Tube daily  collagenase Ointment 1 Application(s) Topical daily  heparin   Injectable 5000 Unit(s) SubCutaneous every 12 hours  melatonin 5 milliGRAM(s) Oral at bedtime  midodrine 10 milliGRAM(s) Oral every 8 hours  nystatin Powder 1 Application(s) Topical two times a day  pantoprazole  Injectable 40 milliGRAM(s) IV Push daily  QUEtiapine 50 milliGRAM(s) Oral at bedtime  thiamine 100 milliGRAM(s) Oral daily    ICU Vital Signs Last 24 Hrs  T(C): 36.7 (31 May 2023 05:38), Max: 36.7 (30 May 2023 16:47)  T(F): 98 (31 May 2023 05:38), Max: 98 (30 May 2023 16:47)  HR: 99 (31 May 2023 09:00) (95 - 107)  BP: 97/53 (31 May 2023 09:00) (89/50 - 108/55)  BP(mean): 64 (31 May 2023 09:00) (57 - 67)  ABP: --  ABP(mean): --  RR: 22 (31 May 2023 09:00) (16 - 33)  SpO2: 100% (31 May 2023 09:00) (93% - 100%)    O2 Parameters below as of 31 May 2023 08:00  Patient On (Oxygen Delivery Method): tracheostomy collar    O2 Concentration (%): 40    Physical Exam    General  -  no distress,  Neuro - Frail weak  HEENT nc/at  neck s/p trach site clean  cv - rrr,  bilateral pigtails ,Cxr clear bilateral crackles  abdomen soft, s/p peg tolerating feeds  lungs bilateral pigtails, cxr clear  extremities - right foot wounds clean heal escar  scrotum wound clean    I&O's Summary    30 May 2023 07:01  -  31 May 2023 07:00  --------------------------------------------------------  IN: 1750 mL / OUT: 1270 mL / NET: 480 mL                          8.5    10.36 )-----------( 263      ( 30 May 2023 09:11 )             27.6       05-30    139  |  104  |  31<H>  ----------------------------<  150<H>  3.9   |  30  |  0.53    Ca    9.8      30 May 2023 09:11  Phos  3.9     05-30  Mg     1.9     05-30    TPro  6.2  /  Alb  2.3<L>  /  TBili  0.4  /  DBili  x   /  AST  59<H>  /  ALT  60  /  AlkPhos  184<H>  05-30    DVT Prophylaxis:  Heparin subq                                                               Advanced Directives: Full Code

## 2023-06-01 PROCEDURE — 71045 X-RAY EXAM CHEST 1 VIEW: CPT | Mod: 26

## 2023-06-01 RX ADMIN — Medication 500 MILLIGRAM(S): at 10:16

## 2023-06-01 RX ADMIN — CHLORHEXIDINE GLUCONATE 15 MILLILITER(S): 213 SOLUTION TOPICAL at 10:15

## 2023-06-01 RX ADMIN — QUETIAPINE FUMARATE 50 MILLIGRAM(S): 200 TABLET, FILM COATED ORAL at 23:14

## 2023-06-01 RX ADMIN — PANTOPRAZOLE SODIUM 40 MILLIGRAM(S): 20 TABLET, DELAYED RELEASE ORAL at 10:15

## 2023-06-01 RX ADMIN — Medication 2000 UNIT(S): at 10:16

## 2023-06-01 RX ADMIN — NYSTATIN CREAM 1 APPLICATION(S): 100000 CREAM TOPICAL at 23:16

## 2023-06-01 RX ADMIN — MIDODRINE HYDROCHLORIDE 10 MILLIGRAM(S): 2.5 TABLET ORAL at 06:41

## 2023-06-01 RX ADMIN — CLOPIDOGREL BISULFATE 75 MILLIGRAM(S): 75 TABLET, FILM COATED ORAL at 10:16

## 2023-06-01 RX ADMIN — MIDODRINE HYDROCHLORIDE 10 MILLIGRAM(S): 2.5 TABLET ORAL at 15:06

## 2023-06-01 RX ADMIN — Medication 5 MILLIGRAM(S): at 23:14

## 2023-06-01 RX ADMIN — AMIODARONE HYDROCHLORIDE 200 MILLIGRAM(S): 400 TABLET ORAL at 10:17

## 2023-06-01 RX ADMIN — Medication 100 MILLIGRAM(S): at 10:16

## 2023-06-01 RX ADMIN — HEPARIN SODIUM 5000 UNIT(S): 5000 INJECTION INTRAVENOUS; SUBCUTANEOUS at 23:14

## 2023-06-01 RX ADMIN — MIDODRINE HYDROCHLORIDE 10 MILLIGRAM(S): 2.5 TABLET ORAL at 23:14

## 2023-06-01 RX ADMIN — HEPARIN SODIUM 5000 UNIT(S): 5000 INJECTION INTRAVENOUS; SUBCUTANEOUS at 10:15

## 2023-06-01 NOTE — PROGRESS NOTE ADULT - ASSESSMENT
A:    77M  HD # 16  DNR    Here for:    1. Shock, septic, hypovolemic 2/2  2. PNA  3. Acute + chronic resp failure  4. Dysphagia s/p PEG  5. Anemia  6. Scrotal cellulitis  7. pleural effusions s/p drainage    P:    Pt now off vent and pressors  However, complex case with multiple complex pathologies being actively managed    Rt CT removed as less output; post pull CXR OK  Plan to d/c Lt pigtail tomorrow  TC as tolerated, work to capping and PO meds; now with fenestrated trach, non fenestrated (w/balloon) trach and inner cannula in room  Levophed titrated to off; midodrine 10mg PO q8h  Seroquel qhs for sleep hygiene agitation  Amiodarone to maintain SR  s/p Cefepime/merrem  Per , no surgical intervention for scrotum  Plavix  Puree + thickened liquids  VTE PUD ppx  f/u Cx of pleural fluids, sputum, blood; all Cxs neg thus far  Imaging reviewed  Wound care  No s/s active bleeding, give pRBC for Hgb < 7    Dispo: Cont care of this complex Pt.

## 2023-06-01 NOTE — PROGRESS NOTE ADULT - ASSESSMENT
Assessment: 76yo Male seen for the following:  -Partial thickness wound to R hallux  -Partial thickness wound to R heel  -Diabetes Mellitus type 2  -Difficulty with ambulation    Plan:   -Chart reviewed and Patient evaluated;  -Discussed diagnosis and treatment with patient.  -Xrays of R foot reviewed: on wet read showing no acute cortical demineralization, noted subtle radiolucency by subcutaneous tissue of posterior heel.   -Wound flush with normal saline. Official impression noted above.   -Applied betadine to Right hallux and xeroform to R heel with DSD  -Wound to Right hallux is superficial in nature with scant sanguinous discharge on evaluation and surrounding eschar tissue, no pus/purulence, no PTB, stable at this time.   Wound to Right heel is superficial in nature with pink wound bed, no fluctuance, no crepitus, no PTB, stable at this time.   -Wound Care: idosorb, xeroform and DSD  -Offloading of bilateral heels with CAIR boots or while bedbound to reduce further tissue damage   -5/18- on xray noted subtle air by subcutaneous tissue at posterior heel. Re-assessed heel wound at bedside today evening. No fluctuance, no crepitus, no PTB. The subcutaneous air noted on xray is air from wound.   -Will continue to monitor wounds and continue local wound care at this time.   -Continue with IV antibiotics As Per ID  -All additional care per Med appreciated  -Patient tolerated interventions well without any complications.   -Podiatry will follow while in house.

## 2023-06-01 NOTE — PROGRESS NOTE ADULT - SUBJECTIVE AND OBJECTIVE BOX
CCU Progress Note    HPI:    S:    Pt seen and examined  HD # 16  DNR  PMHx Dm2, HTN, HLD transferred from [facility] after protracted hospital course for STEMI s/p [facility] to mLCX and dLAD requiring impella complicated by cardiogenic shock requiring [facility] ECMO, respiratory failure requiring trach, BARBRA requiring HD, UGIB, massive hemoptysis originating from lingula s/p bronchial blocker and bronchial artery emobolization, pseudomonas bacteremia and septic shock.  Ultimately improved, minimal vent settings, no longer requiring HD, HD stable and was air transported to  with tentative plan for facility transfer.      5/25: s/p drainage Rt chest for large pleural effusion yesterday; drain Lt side today. Remains on vent.   5/26: s/p drainage B/L chest for large pleural effusions. Some improvement in respiratory status but remains on ventilator.  5/28: On ventilator with trails of PSV and TC. pleural drains still with relatively high output.   5/30: Pressors weaning off, changed to fenestrated trach to allow PO feeds  6/1: Rt chest tube d/c'd; post pull CXR w/o PTX,.    ROS: Unable to verbalize (on vent)      Allergies    No Known Allergies    Intolerances        MEDICATIONS  (STANDING):  aMIOdarone    Tablet 200 milliGRAM(s) Oral daily  ascorbic acid 500 milliGRAM(s) Oral daily  cadexomer iodine 0.9% Gel 1 Application(s) Topical daily  chlorhexidine 0.12% Liquid 15 milliLiter(s) Oral Mucosa every 12 hours  chlorhexidine 4% Liquid 1 Application(s) Topical <User Schedule>  cholecalciferol 2000 Unit(s) Oral daily  clopidogrel Tablet 75 milliGRAM(s) Enteral Tube daily  collagenase Ointment 1 Application(s) Topical daily  heparin   Injectable 5000 Unit(s) SubCutaneous every 12 hours  melatonin 5 milliGRAM(s) Oral at bedtime  midodrine 10 milliGRAM(s) Oral every 8 hours  nystatin Powder 1 Application(s) Topical two times a day  pantoprazole  Injectable 40 milliGRAM(s) IV Push daily  QUEtiapine 50 milliGRAM(s) Oral at bedtime  thiamine 100 milliGRAM(s) Oral daily    MEDICATIONS  (PRN):      Drug Dosing Weight  Height (cm): 165.1 (17 May 2023 20:00)  Weight (kg): 53.2 (16 May 2023 16:35)  BMI (kg/m2): 19.5 (17 May 2023 20:00)  BSA (m2): 1.58 (17 May 2023 20:00)    PAST MEDICAL & SURGICAL HISTORY:  STEMI (ST elevation myocardial infarction)      Cardiogenic shock      Stroke      History of chronic respiratory failure          FAMILY HISTORY:      ROS: See HPI; otherwise, all systems reviewed and negative.    O:    ICU Vital Signs Last 24 Hrs  T(C): 37.1 (01 Jun 2023 04:54), Max: 37.2 (31 May 2023 09:21)  T(F): 98.7 (01 Jun 2023 04:54), Max: 98.9 (31 May 2023 09:21)  HR: 106 (01 Jun 2023 06:00) (91 - 108)  BP: 93/51 (01 Jun 2023 06:00) (41/12 - 116/57)  BP(mean): 61 (01 Jun 2023 06:00) (16 - 72)  ABP: --  ABP(mean): --  RR: 28 (01 Jun 2023 06:00) (17 - 33)  SpO2: 100% (01 Jun 2023 06:00) (98% - 100%)    O2 Parameters below as of 31 May 2023 18:00  Patient On (Oxygen Delivery Method): tracheostomy collar    O2 Concentration (%): 40            I&O's Detail    31 May 2023 07:01  -  01 Jun 2023 07:00  --------------------------------------------------------  IN:    Enteral Tube Flush: 405 mL    Free Water: 1200 mL    Nepro with Carb Steady: 1100 mL  Total IN: 2705 mL    OUT:    Chest Tube (mL): 210 mL    Chest Tube (mL): 190 mL    Indwelling Catheter - Urethral (mL): 400 mL  Total OUT: 800 mL    Total NET: 1905 mL              PE:    Adult M lying in bed  Appears emaciated and chronically ill  No JVD + trach in place  CTA B/L  S1S2+  Abd soft NTND  0-1+ edema B/L LE  Awake, more alert, remains weak  Skin pink, warm  + Lt chest tubes in place    LABS:    CBC Full  -  ( 30 May 2023 09:11 )  WBC Count : 10.36 K/uL  RBC Count : 2.72 M/uL  Hemoglobin : 8.5 g/dL  Hematocrit : 27.6 %  Platelet Count - Automated : 263 K/uL  Mean Cell Volume : 101.5 fl  Mean Cell Hemoglobin : 31.3 pg  Mean Cell Hemoglobin Concentration : 30.8 gm/dL  Auto Neutrophil # : x  Auto Lymphocyte # : x  Auto Monocyte # : x  Auto Eosinophil # : x  Auto Basophil # : x  Auto Neutrophil % : x  Auto Lymphocyte % : x  Auto Monocyte % : x  Auto Eosinophil % : x  Auto Basophil % : x    05-30    139  |  104  |  31<H>  ----------------------------<  150<H>  3.9   |  30  |  0.53    Ca    9.8      30 May 2023 09:11  Phos  3.9     05-30  Mg     1.9     05-30    TPro  6.2  /  Alb  2.3<L>  /  TBili  0.4  /  DBili  x   /  AST  59<H>  /  ALT  60  /  AlkPhos  184<H>  05-30        CAPILLARY BLOOD GLUCOSE            LIVER FUNCTIONS - ( 30 May 2023 09:11 )  Alb: 2.3 g/dL / Pro: 6.2 gm/dL / ALK PHOS: 184 U/L / ALT: 60 U/L / AST: 59 U/L / GGT: x

## 2023-06-01 NOTE — PROGRESS NOTE ADULT - SUBJECTIVE AND OBJECTIVE BOX
Date of Service: 6/1/23  HPI: 76 yo male with hx of DM, HTN, HLD, who presented initially to an outside hospital in [place], [place] on 3/1/23 with chest pain and lightheadedness after doing yard work, found to have an inferiolateral ST elevations.  He was taken to the cath lab and underwent PCI with MEDINA placement to mLCX (Resolute [place] 4 x 26mm), dLAD ([facility] [place] 2.5 x 15mm and 2.25 x 12mm), and Impella CP was placed.   Patient was cannulated for peripheral VA ECMO on 3/2 and decannulated on 3/9.  Impella was removed 3/10.  Hospital course was complicated by multiple watershed strokes, acute respiratory failure requiring tracheostomy, BARBRA requiring renal replacement therapy, upper GI bleed, massive hemoptysis originating from lingula requiring bronchial blocker and bronchial artery embolization, pseudomonas bacteremia and septic shock.    Patient was eventually transported from [facility] on 5/16 to [facility] CCU with a tracheostomy, PEG, tunneled right sided HD catheter, right sided PICC line, rectal tube, and ostomy bag over his penis for urine collection.     (16 May 2023 16:49)    Podiatry consulted for R foot evaluation. Pt at bedside, non verbal, family present. Per family, they report wounds being present for approx 3-4wks. Family admits to noticing yellow drainage from R big toe. Pt no aware pt missing R big toe nail. No additional pedal complaints reported at this time.     6/1/23: Pt seen by Podiatry team for follow up of R foot wounds. Pt resting comfortably at bedside, on vent support.     PMH: STEMI (ST elevation myocardial infarction)    Cardiogenic shock    Stroke    History of chronic respiratory failure      PSH:    Allergies:No Known Allergies      Labs:                        8.5    10.36 )-----------( 263      ( 30 May 2023 09:11 )             27.6     05-30    139  |  104  |  31<H>  ----------------------------<  150<H>  3.9   |  30  |  0.53    Ca    9.8      30 May 2023 09:11  Phos  3.9     05-30  Mg     1.9     05-30    TPro  6.2  /  Alb  2.3<L>  /  TBili  0.4  /  DBili  x   /  AST  59<H>  /  ALT  60  /  AlkPhos  184<H>  05-30               MEDICATIONS  (STANDING):  aMIOdarone    Tablet 200 milliGRAM(s) Oral daily  ascorbic acid 500 milliGRAM(s) Oral daily  cadexomer iodine 0.9% Gel 1 Application(s) Topical daily  chlorhexidine 0.12% Liquid 15 milliLiter(s) Oral Mucosa every 12 hours  chlorhexidine 4% Liquid 1 Application(s) Topical <User Schedule>  cholecalciferol 2000 Unit(s) Oral daily  clopidogrel Tablet 75 milliGRAM(s) Enteral Tube daily  collagenase Ointment 1 Application(s) Topical daily  heparin   Injectable 5000 Unit(s) SubCutaneous every 12 hours  melatonin 5 milliGRAM(s) Oral at bedtime  midodrine 10 milliGRAM(s) Oral every 8 hours  nystatin Powder 1 Application(s) Topical two times a day  pantoprazole  Injectable 40 milliGRAM(s) IV Push daily  QUEtiapine 50 milliGRAM(s) Oral at bedtime  thiamine 100 milliGRAM(s) Oral daily    MEDICATIONS  (PRN):          Physical Exam:   Constitutional: NAD, alert;  Derm:  Skin warm, dry and supple bilateral.    Wound to nail bed of dorsal Left hallux with absent nail, superficial in nature, no hyperkeratotic border, wound base fibrogranular, wound size (approx 2.8 cm X 2cm), no edema, no purulence, no fluctuance, no tracking/tunneling, no probe to bone, mild sanguinous discharge distal to eponychium, eschar noted to medial distal hallux and at eponychium.   Pressure stage 2 wound to Right heel, no PTB, no crepitus, no fluctuance, superficial in nature with pink wound bed.   Vascular: Dorsalis Pedis and Posterior Tibial pulses non palpable.  Capillary refill delayed bilateral.    Neuro: Unable to access due to pt condition  MSK: Unable to access due to pt condition        < from: Xray Foot AP + Lateral + Oblique, Right (05.17.23 @ 15:36) >  INTERPRETATION:  RIGHT foot    CLINICAL INFORMATION: Hallux and heel wound TECHNIQUE: AP,lateral and   oblique views.    FINDINGS: Heel soft tissue subcutaneous air adjacent to intact calcaneal   tuberosity.  RIGHT hallux are radiographically intact.  Second and third hammertoe deformities.  Remaining osseous and joint structures of the RIGHT foot are   radiographicallyintact.  The bones and joint spaces are radiographically intact.  No fracture or dislocation.  Soft tissues unremarkable.    IMPRESSION:    Heel subcutaneous air without radiographic evidence of calcaneal   tuberosity osteolysis/osteomyelitis.  Firsthallux radiographically intact..  If osteomyelitis is clinically considered  despite conservative therapy,   and soft tissue / bone infection requires further assessment, follow-up   MRI recommended.    --- End of Report ---    < end of copied text >   Date of Service: 6/1/23  HPI: 78 yo male with hx of DM, HTN, HLD, who presented initially to an outside hospital in [place], [place] on 3/1/23 with chest pain and lightheadedness after doing yard work, found to have an inferiolateral ST elevations.  He was taken to the cath lab and underwent PCI with MEDINA placement to mLCX (Resolute [place] 4 x 26mm), dLAD (Resolute [place] 2.5 x 15mm and 2.25 x 12mm), and Impella CP was placed.   Patient was cannulated for peripheral VA ECMO on 3/2 and decannulated on 3/9.  Impella was removed 3/10.  Hospital course was complicated by multiple watershed strokes, acute respiratory failure requiring tracheostomy, BARBRA requiring renal replacement therapy, upper GI bleed, massive hemoptysis originating from lingula requiring bronchial blocker and bronchial artery embolization, pseudomonas bacteremia and septic shock.    Patient was eventually transported from [facility] on 5/16 to [facility] CCU with a tracheostomy, PEG, tunneled right sided HD catheter, right sided PICC line, rectal tube, and ostomy bag over his penis for urine collection.     (16 May 2023 16:49)    Podiatry consulted for R foot evaluation. Pt at bedside, non verbal, family present. Per family, they report wounds being present for approx 3-4wks. Family admits to noticing yellow drainage from R big toe. Pt no aware pt missing R big toe nail. No additional pedal complaints reported at this time.     6/1/23: Pt seen by Podiatry team for follow up of R foot wounds. Pt resting comfortably at bedside, on trach chollar.     PMH: STEMI (ST elevation myocardial infarction)    Cardiogenic shock    Stroke    History of chronic respiratory failure      PSH:    Allergies:No Known Allergies      Labs:                        8.5    10.36 )-----------( 263      ( 30 May 2023 09:11 )             27.6     05-30    139  |  104  |  31<H>  ----------------------------<  150<H>  3.9   |  30  |  0.53    Ca    9.8      30 May 2023 09:11  Phos  3.9     05-30  Mg     1.9     05-30    TPro  6.2  /  Alb  2.3<L>  /  TBili  0.4  /  DBili  x   /  AST  59<H>  /  ALT  60  /  AlkPhos  184<H>  05-30               MEDICATIONS  (STANDING):  aMIOdarone    Tablet 200 milliGRAM(s) Oral daily  ascorbic acid 500 milliGRAM(s) Oral daily  cadexomer iodine 0.9% Gel 1 Application(s) Topical daily  chlorhexidine 0.12% Liquid 15 milliLiter(s) Oral Mucosa every 12 hours  chlorhexidine 4% Liquid 1 Application(s) Topical <User Schedule>  cholecalciferol 2000 Unit(s) Oral daily  clopidogrel Tablet 75 milliGRAM(s) Enteral Tube daily  collagenase Ointment 1 Application(s) Topical daily  heparin   Injectable 5000 Unit(s) SubCutaneous every 12 hours  melatonin 5 milliGRAM(s) Oral at bedtime  midodrine 10 milliGRAM(s) Oral every 8 hours  nystatin Powder 1 Application(s) Topical two times a day  pantoprazole  Injectable 40 milliGRAM(s) IV Push daily  QUEtiapine 50 milliGRAM(s) Oral at bedtime  thiamine 100 milliGRAM(s) Oral daily    MEDICATIONS  (PRN):          Physical Exam:   Constitutional: NAD, alert;  Derm:  Skin warm, dry and supple bilateral.    Wound to nail bed of dorsal Left hallux with absent nail, superficial in nature, no hyperkeratotic border, wound base fibrogranular, wound size (approx 2.8 cm X 2cm), no edema, no purulence, no fluctuance, no tracking/tunneling, no probe to bone, mild sanguinous discharge distal to eponychium, eschar noted to medial distal hallux and at eponychium.   Pressure stage 2 wound to Right heel, no PTB, no crepitus, no fluctuance, superficial in nature with pink wound bed.   Vascular: Dorsalis Pedis and Posterior Tibial pulses non palpable.  Capillary refill delayed bilateral.    Neuro: Unable to access due to pt condition  MSK: Unable to access due to pt condition        < from: Xray Foot AP + Lateral + Oblique, Right (05.17.23 @ 15:36) >  INTERPRETATION:  RIGHT foot    CLINICAL INFORMATION: Hallux and heel wound TECHNIQUE: AP,lateral and   oblique views.    FINDINGS: Heel soft tissue subcutaneous air adjacent to intact calcaneal   tuberosity.  RIGHT hallux are radiographically intact.  Second and third hammertoe deformities.  Remaining osseous and joint structures of the RIGHT foot are   radiographicallyintact.  The bones and joint spaces are radiographically intact.  No fracture or dislocation.  Soft tissues unremarkable.    IMPRESSION:    Heel subcutaneous air without radiographic evidence of calcaneal   tuberosity osteolysis/osteomyelitis.  Firsthallux radiographically intact..  If osteomyelitis is clinically considered  despite conservative therapy,   and soft tissue / bone infection requires further assessment, follow-up   MRI recommended.    --- End of Report ---    < end of copied text >

## 2023-06-02 LAB
ANION GAP SERPL CALC-SCNC: 5 MMOL/L — SIGNIFICANT CHANGE UP (ref 5–17)
BUN SERPL-MCNC: 28 MG/DL — HIGH (ref 7–23)
CALCIUM SERPL-MCNC: 10.3 MG/DL — HIGH (ref 8.5–10.1)
CHLORIDE SERPL-SCNC: 102 MMOL/L — SIGNIFICANT CHANGE UP (ref 96–108)
CO2 SERPL-SCNC: 33 MMOL/L — HIGH (ref 22–31)
CREAT SERPL-MCNC: 0.52 MG/DL — SIGNIFICANT CHANGE UP (ref 0.5–1.3)
EGFR: 105 ML/MIN/1.73M2 — SIGNIFICANT CHANGE UP
GLUCOSE SERPL-MCNC: 109 MG/DL — HIGH (ref 70–99)
HCT VFR BLD CALC: 24.4 % — LOW (ref 39–50)
HGB BLD-MCNC: 7.6 G/DL — LOW (ref 13–17)
MAGNESIUM SERPL-MCNC: 1.9 MG/DL — SIGNIFICANT CHANGE UP (ref 1.6–2.6)
MCHC RBC-ENTMCNC: 30.5 PG — SIGNIFICANT CHANGE UP (ref 27–34)
MCHC RBC-ENTMCNC: 31.1 GM/DL — LOW (ref 32–36)
MCV RBC AUTO: 98 FL — SIGNIFICANT CHANGE UP (ref 80–100)
PHOSPHATE SERPL-MCNC: 4.3 MG/DL — SIGNIFICANT CHANGE UP (ref 2.5–4.5)
PLATELET # BLD AUTO: 319 K/UL — SIGNIFICANT CHANGE UP (ref 150–400)
POTASSIUM SERPL-MCNC: 4.1 MMOL/L — SIGNIFICANT CHANGE UP (ref 3.5–5.3)
POTASSIUM SERPL-SCNC: 4.1 MMOL/L — SIGNIFICANT CHANGE UP (ref 3.5–5.3)
RBC # BLD: 2.49 M/UL — LOW (ref 4.2–5.8)
RBC # FLD: 16.6 % — HIGH (ref 10.3–14.5)
SODIUM SERPL-SCNC: 140 MMOL/L — SIGNIFICANT CHANGE UP (ref 135–145)
WBC # BLD: 10.27 K/UL — SIGNIFICANT CHANGE UP (ref 3.8–10.5)
WBC # FLD AUTO: 10.27 K/UL — SIGNIFICANT CHANGE UP (ref 3.8–10.5)

## 2023-06-02 PROCEDURE — 71045 X-RAY EXAM CHEST 1 VIEW: CPT | Mod: 26

## 2023-06-02 RX ADMIN — MIDODRINE HYDROCHLORIDE 10 MILLIGRAM(S): 2.5 TABLET ORAL at 13:05

## 2023-06-02 RX ADMIN — CLOPIDOGREL BISULFATE 75 MILLIGRAM(S): 75 TABLET, FILM COATED ORAL at 10:12

## 2023-06-02 RX ADMIN — QUETIAPINE FUMARATE 50 MILLIGRAM(S): 200 TABLET, FILM COATED ORAL at 23:57

## 2023-06-02 RX ADMIN — Medication 500 MILLIGRAM(S): at 10:12

## 2023-06-02 RX ADMIN — Medication 1 APPLICATION(S): at 10:12

## 2023-06-02 RX ADMIN — HEPARIN SODIUM 5000 UNIT(S): 5000 INJECTION INTRAVENOUS; SUBCUTANEOUS at 10:11

## 2023-06-02 RX ADMIN — Medication 100 MILLIGRAM(S): at 10:12

## 2023-06-02 RX ADMIN — MIDODRINE HYDROCHLORIDE 10 MILLIGRAM(S): 2.5 TABLET ORAL at 07:23

## 2023-06-02 RX ADMIN — NYSTATIN CREAM 1 APPLICATION(S): 100000 CREAM TOPICAL at 10:12

## 2023-06-02 RX ADMIN — Medication 2000 UNIT(S): at 10:12

## 2023-06-02 RX ADMIN — Medication 1 APPLICATION(S): at 12:18

## 2023-06-02 RX ADMIN — PANTOPRAZOLE SODIUM 40 MILLIGRAM(S): 20 TABLET, DELAYED RELEASE ORAL at 10:11

## 2023-06-02 RX ADMIN — CHLORHEXIDINE GLUCONATE 1 APPLICATION(S): 213 SOLUTION TOPICAL at 10:15

## 2023-06-02 RX ADMIN — HEPARIN SODIUM 5000 UNIT(S): 5000 INJECTION INTRAVENOUS; SUBCUTANEOUS at 23:57

## 2023-06-02 RX ADMIN — Medication 5 MILLIGRAM(S): at 23:57

## 2023-06-02 RX ADMIN — MIDODRINE HYDROCHLORIDE 10 MILLIGRAM(S): 2.5 TABLET ORAL at 23:57

## 2023-06-02 RX ADMIN — AMIODARONE HYDROCHLORIDE 200 MILLIGRAM(S): 400 TABLET ORAL at 10:12

## 2023-06-02 NOTE — PROGRESS NOTE ADULT - SUBJECTIVE AND OBJECTIVE BOX
CCU Progress Note    HPI:    HPI:    S:    Pt seen and examined  HD # 17  DNR  PMHx Dm2, HTN, HLD transferred from [facility] after protracted hospital course for STEMI s/p MEDINA to mLCX and dLAD requiring impella complicated by cardiogenic shock requiring VA ECMO, respiratory failure requiring trach, BARBRA requiring HD, UGIB, massive hemoptysis originating from lingula s/p bronchial blocker and bronchial artery emobolization, pseudomonas bacteremia and septic shock.  Ultimately improved, minimal vent settings, no longer requiring HD, HD stable and was air transported to  with tentative plan for facility transfer.      5/25: s/p drainage Rt chest for large pleural effusion yesterday; drain Lt side today. Remains on vent.   5/26: s/p drainage B/L chest for large pleural effusions. Some improvement in respiratory status but remains on ventilator.  5/28: On ventilator with trails of PSV and TC. pleural drains still with relatively high output.   5/30: Pressors weaning off, changed to fenestrated trach to allow PO feeds  6/1: Rt chest tube d/c'd; post pull CXR w/o PTX,.  6/2: Lt chest tube d/c'd today. Tolerating more PO. OOBTC for a short time yesterday.     ROS: Unable to verbalize (on vent)    Allergies    No Known Allergies    Intolerances    MEDICATIONS  (STANDING):    aMIOdarone    Tablet 200 milliGRAM(s) Oral daily  ascorbic acid 500 milliGRAM(s) Oral daily  cadexomer iodine 0.9% Gel 1 Application(s) Topical daily  chlorhexidine 4% Liquid 1 Application(s) Topical <User Schedule>  cholecalciferol 2000 Unit(s) Oral daily  clopidogrel Tablet 75 milliGRAM(s) Enteral Tube daily  collagenase Ointment 1 Application(s) Topical daily  heparin   Injectable 5000 Unit(s) SubCutaneous every 12 hours  melatonin 5 milliGRAM(s) Oral at bedtime  midodrine 10 milliGRAM(s) Oral every 8 hours  nystatin Powder 1 Application(s) Topical two times a day  pantoprazole  Injectable 40 milliGRAM(s) IV Push daily  QUEtiapine 50 milliGRAM(s) Oral at bedtime  thiamine 100 milliGRAM(s) Oral daily    MEDICATIONS  (PRN):    Drug Dosing Weight    Height (cm): 165.1 (17 May 2023 20:00)  Weight (kg): 53.2 (16 May 2023 16:35)  BMI (kg/m2): 19.5 (17 May 2023 20:00)  BSA (m2): 1.58 (17 May 2023 20:00)    PAST MEDICAL & SURGICAL HISTORY:    STEMI (ST elevation myocardial infarction)  Cardiogenic shock  Stroke    History of chronic respiratory failure    FAMILY HISTORY:    ROS: See HPI; otherwise, all systems reviewed and negative.    O:    ICU Vital Signs Last 24 Hrs  T(C): 36.6 (02 Jun 2023 04:00), Max: 37.2 (01 Jun 2023 11:00)  T(F): 97.9 (02 Jun 2023 04:00), Max: 99 (01 Jun 2023 11:00)  HR: 106 (02 Jun 2023 05:59) (97 - 114)  BP: 98/48 (02 Jun 2023 06:00) (75/46 - 108/60)  BP(mean): 60 (02 Jun 2023 06:00) (53 - 71)  ABP: --  ABP(mean): --  RR: 18 (02 Jun 2023 06:00) (17 - 35)  SpO2: 100% (02 Jun 2023 06:00) (99% - 100%)    O2 Parameters below as of 02 Jun 2023 05:59  Patient On (Oxygen Delivery Method): tracheostomy collar  O2 Flow (L/min): 10  O2 Concentration (%): 28    I&O's Detail    01 Jun 2023 07:01  -  02 Jun 2023 07:00  --------------------------------------------------------  IN:    Enteral Tube Flush: 350 mL    Free Water: 300 mL    Nepro with Carb Steady: 1060 mL  Total IN: 1710 mL    OUT:    Chest Tube (mL): 150 mL    Chest Tube (mL): 70 mL  Total OUT: 220 mL    Total NET: 1490 mL    PE:    Adult M lying in bed  Appears emaciated and chronically ill  No JVD + trach in place, capped  CTA B/L  S1S2+  Abd soft NTND  0-1+ edema B/L LE  Awake, more alert, remains weak  Skin pink, warm  + Lt chest tubes in place, no AL, to WS, output ~ 150/24h    LABS:    CBC Full  -  ( 02 Jun 2023 06:12 )  WBC Count : 10.27 K/uL  RBC Count : 2.49 M/uL  Hemoglobin : 7.6 g/dL  Hematocrit : 24.4 %  Platelet Count - Automated : 319 K/uL  Mean Cell Volume : 98.0 fl  Mean Cell Hemoglobin : 30.5 pg  Mean Cell Hemoglobin Concentration : 31.1 gm/dL  Auto Neutrophil # : x  Auto Lymphocyte # : x  Auto Monocyte # : x  Auto Eosinophil # : x  Auto Basophil # : x  Auto Neutrophil % : x  Auto Lymphocyte % : x  Auto Monocyte % : x  Auto Eosinophil % : x  Auto Basophil % : x    06-02    140  |  102  |  28<H>  ----------------------------<  109<H>  4.1   |  33<H>  |  0.52    Ca    10.3<H>      02 Jun 2023 06:12  Phos  4.3     06-02  Mg     1.9     06-02          CAPILLARY BLOOD GLUCOSE

## 2023-06-03 LAB
ALBUMIN SERPL ELPH-MCNC: 2.4 G/DL — LOW (ref 3.3–5)
ALP SERPL-CCNC: 113 U/L — SIGNIFICANT CHANGE UP (ref 40–120)
ALT FLD-CCNC: 30 U/L — SIGNIFICANT CHANGE UP (ref 12–78)
ANION GAP SERPL CALC-SCNC: 6 MMOL/L — SIGNIFICANT CHANGE UP (ref 5–17)
AST SERPL-CCNC: 32 U/L — SIGNIFICANT CHANGE UP (ref 15–37)
BILIRUB SERPL-MCNC: 0.5 MG/DL — SIGNIFICANT CHANGE UP (ref 0.2–1.2)
BUN SERPL-MCNC: 25 MG/DL — HIGH (ref 7–23)
CALCIUM SERPL-MCNC: 9.2 MG/DL — SIGNIFICANT CHANGE UP (ref 8.5–10.1)
CHLORIDE SERPL-SCNC: 101 MMOL/L — SIGNIFICANT CHANGE UP (ref 96–108)
CO2 SERPL-SCNC: 30 MMOL/L — SIGNIFICANT CHANGE UP (ref 22–31)
CREAT SERPL-MCNC: 0.68 MG/DL — SIGNIFICANT CHANGE UP (ref 0.5–1.3)
EGFR: 97 ML/MIN/1.73M2 — SIGNIFICANT CHANGE UP
GLUCOSE SERPL-MCNC: 94 MG/DL — SIGNIFICANT CHANGE UP (ref 70–99)
HCT VFR BLD CALC: 24.6 % — LOW (ref 39–50)
HGB BLD-MCNC: 7.7 G/DL — LOW (ref 13–17)
MAGNESIUM SERPL-MCNC: 1.7 MG/DL — SIGNIFICANT CHANGE UP (ref 1.6–2.6)
MCHC RBC-ENTMCNC: 30.6 PG — SIGNIFICANT CHANGE UP (ref 27–34)
MCHC RBC-ENTMCNC: 31.3 GM/DL — LOW (ref 32–36)
MCV RBC AUTO: 97.6 FL — SIGNIFICANT CHANGE UP (ref 80–100)
PHOSPHATE SERPL-MCNC: 4.2 MG/DL — SIGNIFICANT CHANGE UP (ref 2.5–4.5)
PLATELET # BLD AUTO: 309 K/UL — SIGNIFICANT CHANGE UP (ref 150–400)
POTASSIUM SERPL-MCNC: 4.1 MMOL/L — SIGNIFICANT CHANGE UP (ref 3.5–5.3)
POTASSIUM SERPL-SCNC: 4.1 MMOL/L — SIGNIFICANT CHANGE UP (ref 3.5–5.3)
PROT SERPL-MCNC: 6.2 GM/DL — SIGNIFICANT CHANGE UP (ref 6–8.3)
RBC # BLD: 2.52 M/UL — LOW (ref 4.2–5.8)
RBC # FLD: 16.5 % — HIGH (ref 10.3–14.5)
SODIUM SERPL-SCNC: 137 MMOL/L — SIGNIFICANT CHANGE UP (ref 135–145)
WBC # BLD: 8.69 K/UL — SIGNIFICANT CHANGE UP (ref 3.8–10.5)
WBC # FLD AUTO: 8.69 K/UL — SIGNIFICANT CHANGE UP (ref 3.8–10.5)

## 2023-06-03 PROCEDURE — 99291 CRITICAL CARE FIRST HOUR: CPT

## 2023-06-03 RX ADMIN — QUETIAPINE FUMARATE 50 MILLIGRAM(S): 200 TABLET, FILM COATED ORAL at 21:59

## 2023-06-03 RX ADMIN — Medication 500 MILLIGRAM(S): at 12:12

## 2023-06-03 RX ADMIN — CHLORHEXIDINE GLUCONATE 1 APPLICATION(S): 213 SOLUTION TOPICAL at 07:00

## 2023-06-03 RX ADMIN — Medication 2000 UNIT(S): at 12:12

## 2023-06-03 RX ADMIN — NYSTATIN CREAM 1 APPLICATION(S): 100000 CREAM TOPICAL at 00:00

## 2023-06-03 RX ADMIN — AMIODARONE HYDROCHLORIDE 200 MILLIGRAM(S): 400 TABLET ORAL at 12:12

## 2023-06-03 RX ADMIN — Medication 5 MILLIGRAM(S): at 21:58

## 2023-06-03 RX ADMIN — MIDODRINE HYDROCHLORIDE 10 MILLIGRAM(S): 2.5 TABLET ORAL at 16:18

## 2023-06-03 RX ADMIN — HEPARIN SODIUM 5000 UNIT(S): 5000 INJECTION INTRAVENOUS; SUBCUTANEOUS at 12:10

## 2023-06-03 RX ADMIN — MIDODRINE HYDROCHLORIDE 10 MILLIGRAM(S): 2.5 TABLET ORAL at 07:00

## 2023-06-03 RX ADMIN — MIDODRINE HYDROCHLORIDE 10 MILLIGRAM(S): 2.5 TABLET ORAL at 21:59

## 2023-06-03 RX ADMIN — CLOPIDOGREL BISULFATE 75 MILLIGRAM(S): 75 TABLET, FILM COATED ORAL at 12:12

## 2023-06-03 RX ADMIN — NYSTATIN CREAM 1 APPLICATION(S): 100000 CREAM TOPICAL at 12:07

## 2023-06-03 RX ADMIN — HEPARIN SODIUM 5000 UNIT(S): 5000 INJECTION INTRAVENOUS; SUBCUTANEOUS at 21:59

## 2023-06-03 RX ADMIN — Medication 100 MILLIGRAM(S): at 12:12

## 2023-06-03 NOTE — PROGRESS NOTE ADULT - SUBJECTIVE AND OBJECTIVE BOX
DNR  PMHx Dm2, HTN, HLD transferred from [facility] after protracted hospital course for STEMI s/p MEDINA to mLCX and dLAD requiring impella complicated by cardiogenic shock requiring VA ECMO, respiratory failure requiring trach, BARBRA requiring HD, UGIB, massive hemoptysis originating from lingula s/p bronchial blocker and bronchial artery emobolization, pseudomonas bacteremia and septic shock.  Ultimately improved, minimal vent settings, no longer requiring HD, HD stable and was air transported to  with tentative plan for facility transfer.      5/25: s/p drainage Rt chest for large pleural effusion yesterday; drain Lt side today. Remains on vent.   5/26: s/p drainage B/L chest for large pleural effusions. Some improvement in respiratory status but remains on ventilator.  5/28: On ventilator with trails of PSV and TC. pleural drains still with relatively high output.   5/30: Pressors weaning off, changed to fenestrated trach to allow PO feeds  6/1: Rt chest tube d/c'd; post pull CXR w/o PTX,.  6/2: Lt chest tube d/c'd today. Tolerating more PO. OOBTC for a short time yesterday.   6/3  Case discussed on CCU rounds, no acute events.      ICU Vital Signs Last 24 Hrs  T(C): 36.3 (03 Jun 2023 09:00), Max: 36.7 (03 Jun 2023 04:00)  T(F): 97.3 (03 Jun 2023 09:00), Max: 98 (03 Jun 2023 04:00)  HR: 104 (03 Jun 2023 14:00) (100 - 109)  BP: 90/52 (03 Jun 2023 12:00) (89/46 - 107/56)  BP(mean): 61 (03 Jun 2023 12:00) (55 - 71)  RR: 26 (03 Jun 2023 14:00) (16 - 35)  SpO2: 100% (03 Jun 2023 14:00) (98% - 100%)    O2 Parameters below as of 02 Jun 2023 18:00  Patient On (Oxygen Delivery Method): tracheostomy collar    O2 Concentration (%): 28                                7.7    8.69  )-----------( 309      ( 03 Jun 2023 05:19 )             24.6         06-03    137  |  101  |  25<H>  ----------------------------<  94  4.1   |  30  |  0.68    Ca    9.2      03 Jun 2023 05:19  Phos  4.2     06-03  Mg     1.7     06-03    TPro  6.2  /  Alb  2.4<L>  /  TBili  0.5  /  DBili  x   /  AST  32  /  ALT  30  /  AlkPhos  113  06-03    LIVER FUNCTIONS - ( 03 Jun 2023 05:19 )  Alb: 2.4 g/dL / Pro: 6.2 gm/dL / ALK PHOS: 113 U/L / ALT: 30 U/L / AST: 32 U/L / GGT: x

## 2023-06-03 NOTE — PROGRESS NOTE ADULT - PROBLEM SELECTOR PLAN 1
pulmonary toilet  trach collar  PT
stable- trach collar  PT
pulmonary toilet  vent support as needed  trach care
pulmonary toilet  trach collar

## 2023-06-03 NOTE — PROGRESS NOTE ADULT - PROBLEM SELECTOR PROBLEM 1
Acute respiratory failure with hypoxia
Scrotal edema
Acute respiratory failure with hypoxia

## 2023-06-03 NOTE — PROGRESS NOTE ADULT - PROBLEM SELECTOR PROBLEM 2
Scrotal infection
CAD (coronary artery disease)

## 2023-06-03 NOTE — PROGRESS NOTE ADULT - SUBJECTIVE AND OBJECTIVE BOX
Date of Service: 6/3/23  HPI: 76 yo male with hx of DM, HTN, HLD, who presented initially to an outside hospital in [place], [place] on 3/1/23 with chest pain and lightheadedness after doing yard work, found to have an inferiolateral ST elevations.  He was taken to the cath lab and underwent PCI with MEDINA placement to mLCX (Resolute [place] 4 x 26mm), dLAD (Resolute [place] 2.5 x 15mm and 2.25 x 12mm), and Impella CP was placed.   Patient was cannulated for peripheral VA ECMO on 3/2 and decannulated on 3/9.  Impella was removed 3/10.  Hospital course was complicated by multiple watershed strokes, acute respiratory failure requiring tracheostomy, BARBRA requiring renal replacement therapy, upper GI bleed, massive hemoptysis originating from lingula requiring bronchial blocker and bronchial artery embolization, pseudomonas bacteremia and septic shock.    Patient was eventually transported from [facility] on 5/16 to [facility] CCU with a tracheostomy, PEG, tunneled right sided HD catheter, right sided PICC line, rectal tube, and ostomy bag over his penis for urine collection.     (16 May 2023 16:49)    Podiatry consulted for R foot evaluation. Pt at bedside, non verbal, family present. Per family, they report wounds being present for approx 3-4wks. Family admits to noticing yellow drainage from R big toe. Pt no aware pt missing R big toe nail. No additional pedal complaints reported at this time.     6/3/23: Pt seen by Podiatry team for follow up of R foot wounds w attending present. Pt resting comfortably at bedside, on trach chollar.     PMH: STEMI (ST elevation myocardial infarction)    Cardiogenic shock    Stroke    History of chronic respiratory failure      PSH:    Allergies:No Known Allergies      Labs:                        7.7    8.69  )-----------( 309      ( 03 Jun 2023 05:19 )             24.6     06-03    137  |  101  |  25<H>  ----------------------------<  94  4.1   |  30  |  0.68    Ca    9.2      03 Jun 2023 05:19  Phos  4.2     06-03  Mg     1.7     06-03    TPro  6.2  /  Alb  2.4<L>  /  TBili  0.5  /  DBili  x   /  AST  32  /  ALT  30  /  AlkPhos  113  06-03        MEDICATIONS  (STANDING):  aMIOdarone    Tablet 200 milliGRAM(s) Oral daily  ascorbic acid 500 milliGRAM(s) Oral daily  cadexomer iodine 0.9% Gel 1 Application(s) Topical daily  chlorhexidine 4% Liquid 1 Application(s) Topical <User Schedule>  cholecalciferol 2000 Unit(s) Oral daily  clopidogrel Tablet 75 milliGRAM(s) Enteral Tube daily  collagenase Ointment 1 Application(s) Topical daily  heparin   Injectable 5000 Unit(s) SubCutaneous every 12 hours  melatonin 5 milliGRAM(s) Oral at bedtime  midodrine 10 milliGRAM(s) Oral every 8 hours  nystatin Powder 1 Application(s) Topical two times a day  pantoprazole  Injectable 40 milliGRAM(s) IV Push daily  QUEtiapine 50 milliGRAM(s) Oral at bedtime  thiamine 100 milliGRAM(s) Oral daily    MEDICATIONS  (PRN):            Physical Exam:   Constitutional: NAD, alert;  Derm:  Skin warm, dry and supple bilateral.    Wound to nail bed of dorsal Left hallux with absent nail, superficial in nature, no hyperkeratotic border, wound base fibrogranular, wound size (approx 2.8 cm X 2cm), no edema, no purulence, no fluctuance, no tracking/tunneling, no probe to bone, mild sanguinous discharge distal to eponychium, eschar noted to medial distal hallux and at eponychium.   Pressure stage 2 wound to Right heel, no PTB, no crepitus, no fluctuance, superficial in nature with pink wound bed.   Vascular: Dorsalis Pedis and Posterior Tibial pulses non palpable.  Capillary refill delayed bilateral.    Neuro: Unable to access due to pt condition  MSK: Unable to access due to pt condition        < from: Xray Foot AP + Lateral + Oblique, Right (05.17.23 @ 15:36) >  INTERPRETATION:  RIGHT foot    CLINICAL INFORMATION: Hallux and heel wound TECHNIQUE: AP,lateral and   oblique views.    FINDINGS: Heel soft tissue subcutaneous air adjacent to intact calcaneal   tuberosity.  RIGHT hallux are radiographically intact.  Second and third hammertoe deformities.  Remaining osseous and joint structures of the RIGHT foot are   radiographicallyintact.  The bones and joint spaces are radiographically intact.  No fracture or dislocation.  Soft tissues unremarkable.    IMPRESSION:    Heel subcutaneous air without radiographic evidence of calcaneal   tuberosity osteolysis/osteomyelitis.  Firsthallux radiographically intact..  If osteomyelitis is clinically considered  despite conservative therapy,   and soft tissue / bone infection requires further assessment, follow-up   MRI recommended.    --- End of Report ---    < end of copied text >

## 2023-06-03 NOTE — PROGRESS NOTE ADULT - ATTENDING COMMENTS
I agree w/ assesment and plan.
I agree with the assessment and plan
I agree with the assessment and plan
I agree w/ assesment and plan.
I agree w/ assesment and plan.
I agree with the assessment and plan

## 2023-06-04 PROCEDURE — 71045 X-RAY EXAM CHEST 1 VIEW: CPT | Mod: 26

## 2023-06-04 RX ADMIN — CHLORHEXIDINE GLUCONATE 1 APPLICATION(S): 213 SOLUTION TOPICAL at 06:26

## 2023-06-04 RX ADMIN — AMIODARONE HYDROCHLORIDE 200 MILLIGRAM(S): 400 TABLET ORAL at 09:02

## 2023-06-04 RX ADMIN — Medication 2000 UNIT(S): at 09:03

## 2023-06-04 RX ADMIN — HEPARIN SODIUM 5000 UNIT(S): 5000 INJECTION INTRAVENOUS; SUBCUTANEOUS at 21:21

## 2023-06-04 RX ADMIN — MIDODRINE HYDROCHLORIDE 10 MILLIGRAM(S): 2.5 TABLET ORAL at 14:39

## 2023-06-04 RX ADMIN — QUETIAPINE FUMARATE 50 MILLIGRAM(S): 200 TABLET, FILM COATED ORAL at 21:21

## 2023-06-04 RX ADMIN — MIDODRINE HYDROCHLORIDE 10 MILLIGRAM(S): 2.5 TABLET ORAL at 21:21

## 2023-06-04 RX ADMIN — PANTOPRAZOLE SODIUM 40 MILLIGRAM(S): 20 TABLET, DELAYED RELEASE ORAL at 09:03

## 2023-06-04 RX ADMIN — Medication 500 MILLIGRAM(S): at 09:02

## 2023-06-04 RX ADMIN — NYSTATIN CREAM 1 APPLICATION(S): 100000 CREAM TOPICAL at 21:22

## 2023-06-04 RX ADMIN — HEPARIN SODIUM 5000 UNIT(S): 5000 INJECTION INTRAVENOUS; SUBCUTANEOUS at 09:02

## 2023-06-04 RX ADMIN — NYSTATIN CREAM 1 APPLICATION(S): 100000 CREAM TOPICAL at 09:03

## 2023-06-04 RX ADMIN — Medication 100 MILLIGRAM(S): at 09:03

## 2023-06-04 RX ADMIN — Medication 1 APPLICATION(S): at 09:04

## 2023-06-04 RX ADMIN — Medication 1 APPLICATION(S): at 12:32

## 2023-06-04 RX ADMIN — MIDODRINE HYDROCHLORIDE 10 MILLIGRAM(S): 2.5 TABLET ORAL at 06:26

## 2023-06-04 RX ADMIN — CLOPIDOGREL BISULFATE 75 MILLIGRAM(S): 75 TABLET, FILM COATED ORAL at 09:02

## 2023-06-04 RX ADMIN — Medication 5 MILLIGRAM(S): at 21:21

## 2023-06-04 NOTE — PROGRESS NOTE ADULT - ASSESSMENT
A:    77M  HD # 19  DNR    Here for:    1. Shock, septic, hypovolemic 2/2  2. PNA  3. Acute + chronic resp failure  4. Dysphagia s/p PEG  5. Anemia  6. Scrotal cellulitis  7. pleural effusions s/p drainage    P:    Pt now off vent and pressors  However, complex case with multiple complex pathologies being actively managed    B/L CT's removed    Trach coughed out, replaced; oxygenating and breathing OK, will obtain CXR, went back w/o issues  fenestrated trach, non fenestrated (w/balloon) trach and inner cannula in room  Levophed titrated to off; midodrine 10mg PO q8h, hypotension at this point 2/2 low CO and inability to compensate with increase in SVR 2/2 orthostatic  Seroquel qhs for sleep hygiene agitation  Amiodarone to maintain SR  s/p Cefepime/merrem  Per , no surgical intervention for scrotum  Plavix  Puree + thickened liquids; increase PO intake, hopefully start backing off TF's soon as PO caloric intake increases  VTE PUD ppx  f/u Cx of pleural fluids, sputum, blood; all Cxs neg thus far  Imaging reviewed  Wound care  No s/s active bleeding, give pRBC for Hgb < 7    Dispo: Cont care of this complex Pt.

## 2023-06-04 NOTE — PROGRESS NOTE ADULT - SUBJECTIVE AND OBJECTIVE BOX
CCU Progress Note    HPI:    S:    Pt seen and examined  HD # 19  DNR  PMHx Dm2, HTN, HLD transferred from [facility] after protracted hospital course for STEMI s/p MEDINA to mLCX and dLAD requiring impella complicated by cardiogenic shock requiring VA ECMO, respiratory failure requiring trach, BARBRA requiring HD, UGIB, massive hemoptysis originating from lingula s/p bronchial blocker and bronchial artery emobolization, pseudomonas bacteremia and septic shock.  Ultimately improved, minimal vent settings, no longer requiring HD, HD stable and was air transported to  with tentative plan for facility transfer.      5/25: s/p drainage Rt chest for large pleural effusion yesterday; drain Lt side today. Remains on vent.   5/26: s/p drainage B/L chest for large pleural effusions. Some improvement in respiratory status but remains on ventilator.  5/28: On ventilator with trails of PSV and TC. pleural drains still with relatively high output.   5/30: Pressors weaning off, changed to fenestrated trach to allow PO feeds  6/1: Rt chest tube d/c'd; post pull CXR w/o PTX,.  6/2: Lt chest tube d/c'd today. Tolerating more PO. OOBTC for a short time yesterday.   6/4: B/L chest tubes removed. Coughed out trach today; replaced, pending CXR.     ROS: Unable to verbalize (on vent)    Allergies    No Known Allergies    Intolerances    MEDICATIONS  (STANDING):    aMIOdarone    Tablet 200 milliGRAM(s) Oral daily  ascorbic acid 500 milliGRAM(s) Oral daily  cadexomer iodine 0.9% Gel 1 Application(s) Topical daily  chlorhexidine 4% Liquid 1 Application(s) Topical <User Schedule>  cholecalciferol 2000 Unit(s) Oral daily  clopidogrel Tablet 75 milliGRAM(s) Enteral Tube daily  collagenase Ointment 1 Application(s) Topical daily  heparin   Injectable 5000 Unit(s) SubCutaneous every 12 hours  melatonin 5 milliGRAM(s) Oral at bedtime  midodrine 10 milliGRAM(s) Oral every 8 hours  nystatin Powder 1 Application(s) Topical two times a day  pantoprazole  Injectable 40 milliGRAM(s) IV Push daily  QUEtiapine 50 milliGRAM(s) Oral at bedtime  thiamine 100 milliGRAM(s) Oral daily    MEDICATIONS  (PRN):    Drug Dosing Weight    Height (cm): 165.1 (17 May 2023 20:00)  Weight (kg): 53.2 (16 May 2023 16:35)  BMI (kg/m2): 19.5 (17 May 2023 20:00)  BSA (m2): 1.58 (17 May 2023 20:00)    PAST MEDICAL & SURGICAL HISTORY:    STEMI (ST elevation myocardial infarction)  Cardiogenic shock  Stroke  History of chronic respiratory failure    FAMILY HISTORY:    ROS: See HPI; otherwise, all systems reviewed and negative.    O:    ICU Vital Signs Last 24 Hrs  T(C): 35.6 (04 Jun 2023 08:00), Max: 36.7 (04 Jun 2023 00:00)  T(F): 96 (04 Jun 2023 08:00), Max: 98.1 (04 Jun 2023 00:00)  HR: 107 (04 Jun 2023 10:00) (99 - 110)  BP: 111/64 (04 Jun 2023 10:00) (89/54 - 115/67)  BP(mean): 76 (04 Jun 2023 10:00) (55 - 79)  ABP: --  ABP(mean): --  RR: 31 (04 Jun 2023 10:00) (15 - 34)  SpO2: 100% (04 Jun 2023 10:00) (96% - 100%)    O2 Parameters below as of 04 Jun 2023 08:00  Patient On (Oxygen Delivery Method): room air    I&O's Detail    03 Jun 2023 07:01  -  04 Jun 2023 07:00  --------------------------------------------------------  IN:  Total IN: 0 mL    OUT:    Voided (mL): 500 mL  Total OUT: 500 mL    Total NET: -500 mL    PE:    Adult M lying in bed  Appears emaciated and chronically ill  No JVD + trach in place, capped  CTA B/L  S1S2+  Abd soft NTND  0-1+ edema B/L LE  Awake, more alert, remains weak  Skin pink, warm        LABS:    CBC Full  -  ( 03 Jun 2023 05:19 )  WBC Count : 8.69 K/uL  RBC Count : 2.52 M/uL  Hemoglobin : 7.7 g/dL  Hematocrit : 24.6 %  Platelet Count - Automated : 309 K/uL  Mean Cell Volume : 97.6 fl  Mean Cell Hemoglobin : 30.6 pg  Mean Cell Hemoglobin Concentration : 31.3 gm/dL  Auto Neutrophil # : x  Auto Lymphocyte # : x  Auto Monocyte # : x  Auto Eosinophil # : x  Auto Basophil # : x  Auto Neutrophil % : x  Auto Lymphocyte % : x  Auto Monocyte % : x  Auto Eosinophil % : x  Auto Basophil % : x    06-03    137  |  101  |  25<H>  ----------------------------<  94  4.1   |  30  |  0.68    Ca    9.2      03 Jun 2023 05:19  Phos  4.2     06-03  Mg     1.7     06-03    TPro  6.2  /  Alb  2.4<L>  /  TBili  0.5  /  DBili  x   /  AST  32  /  ALT  30  /  AlkPhos  113  06-03        CAPILLARY BLOOD GLUCOSE            LIVER FUNCTIONS - ( 03 Jun 2023 05:19 )  Alb: 2.4 g/dL / Pro: 6.2 gm/dL / ALK PHOS: 113 U/L / ALT: 30 U/L / AST: 32 U/L / GGT: x

## 2023-06-05 LAB
ANION GAP SERPL CALC-SCNC: 6 MMOL/L — SIGNIFICANT CHANGE UP (ref 5–17)
BUN SERPL-MCNC: 19 MG/DL — SIGNIFICANT CHANGE UP (ref 7–23)
CALCIUM SERPL-MCNC: 8.5 MG/DL — SIGNIFICANT CHANGE UP (ref 8.5–10.1)
CHLORIDE SERPL-SCNC: 102 MMOL/L — SIGNIFICANT CHANGE UP (ref 96–108)
CO2 SERPL-SCNC: 29 MMOL/L — SIGNIFICANT CHANGE UP (ref 22–31)
CREAT SERPL-MCNC: 0.68 MG/DL — SIGNIFICANT CHANGE UP (ref 0.5–1.3)
EGFR: 97 ML/MIN/1.73M2 — SIGNIFICANT CHANGE UP
GLUCOSE SERPL-MCNC: 95 MG/DL — SIGNIFICANT CHANGE UP (ref 70–99)
HCT VFR BLD CALC: 26.4 % — LOW (ref 39–50)
HGB BLD-MCNC: 8.4 G/DL — LOW (ref 13–17)
MAGNESIUM SERPL-MCNC: 1.7 MG/DL — SIGNIFICANT CHANGE UP (ref 1.6–2.6)
MCHC RBC-ENTMCNC: 31.1 PG — SIGNIFICANT CHANGE UP (ref 27–34)
MCHC RBC-ENTMCNC: 31.8 GM/DL — LOW (ref 32–36)
MCV RBC AUTO: 97.8 FL — SIGNIFICANT CHANGE UP (ref 80–100)
PHOSPHATE SERPL-MCNC: 4.4 MG/DL — SIGNIFICANT CHANGE UP (ref 2.5–4.5)
PLATELET # BLD AUTO: 327 K/UL — SIGNIFICANT CHANGE UP (ref 150–400)
POTASSIUM SERPL-MCNC: 3.8 MMOL/L — SIGNIFICANT CHANGE UP (ref 3.5–5.3)
POTASSIUM SERPL-SCNC: 3.8 MMOL/L — SIGNIFICANT CHANGE UP (ref 3.5–5.3)
RBC # BLD: 2.7 M/UL — LOW (ref 4.2–5.8)
RBC # FLD: 16.3 % — HIGH (ref 10.3–14.5)
SODIUM SERPL-SCNC: 137 MMOL/L — SIGNIFICANT CHANGE UP (ref 135–145)
WBC # BLD: 7.86 K/UL — SIGNIFICANT CHANGE UP (ref 3.8–10.5)
WBC # FLD AUTO: 7.86 K/UL — SIGNIFICANT CHANGE UP (ref 3.8–10.5)

## 2023-06-05 PROCEDURE — 99232 SBSQ HOSP IP/OBS MODERATE 35: CPT

## 2023-06-05 RX ORDER — CHLORHEXIDINE GLUCONATE 213 G/1000ML
1 SOLUTION TOPICAL
Refills: 0 | Status: DISCONTINUED | OUTPATIENT
Start: 2023-06-05 | End: 2023-06-06

## 2023-06-05 RX ADMIN — AMIODARONE HYDROCHLORIDE 200 MILLIGRAM(S): 400 TABLET ORAL at 10:43

## 2023-06-05 RX ADMIN — QUETIAPINE FUMARATE 50 MILLIGRAM(S): 200 TABLET, FILM COATED ORAL at 23:05

## 2023-06-05 RX ADMIN — CHLORHEXIDINE GLUCONATE 1 APPLICATION(S): 213 SOLUTION TOPICAL at 06:54

## 2023-06-05 RX ADMIN — MIDODRINE HYDROCHLORIDE 10 MILLIGRAM(S): 2.5 TABLET ORAL at 06:54

## 2023-06-05 RX ADMIN — Medication 5 MILLIGRAM(S): at 23:05

## 2023-06-05 RX ADMIN — MIDODRINE HYDROCHLORIDE 10 MILLIGRAM(S): 2.5 TABLET ORAL at 13:26

## 2023-06-05 RX ADMIN — HEPARIN SODIUM 5000 UNIT(S): 5000 INJECTION INTRAVENOUS; SUBCUTANEOUS at 10:43

## 2023-06-05 RX ADMIN — Medication 500 MILLIGRAM(S): at 10:43

## 2023-06-05 RX ADMIN — Medication 2000 UNIT(S): at 10:43

## 2023-06-05 RX ADMIN — Medication 1 APPLICATION(S): at 13:26

## 2023-06-05 RX ADMIN — Medication 100 MILLIGRAM(S): at 10:43

## 2023-06-05 RX ADMIN — PANTOPRAZOLE SODIUM 40 MILLIGRAM(S): 20 TABLET, DELAYED RELEASE ORAL at 10:43

## 2023-06-05 RX ADMIN — NYSTATIN CREAM 1 APPLICATION(S): 100000 CREAM TOPICAL at 23:05

## 2023-06-05 RX ADMIN — CLOPIDOGREL BISULFATE 75 MILLIGRAM(S): 75 TABLET, FILM COATED ORAL at 10:44

## 2023-06-05 RX ADMIN — HEPARIN SODIUM 5000 UNIT(S): 5000 INJECTION INTRAVENOUS; SUBCUTANEOUS at 23:05

## 2023-06-05 RX ADMIN — NYSTATIN CREAM 1 APPLICATION(S): 100000 CREAM TOPICAL at 10:44

## 2023-06-05 RX ADMIN — MIDODRINE HYDROCHLORIDE 10 MILLIGRAM(S): 2.5 TABLET ORAL at 23:05

## 2023-06-05 NOTE — PROGRESS NOTE ADULT - ASSESSMENT
76 y/o male with PMH HTN, HLD, DM2, recent prolonged hospital course in [place], [place] after a IWMI (underwent PCI with MEDINA to LCx) complicated by cardiogenic shock requiring ECMO, acute hypoxic resp failure requiring trach, dysphagia requiring PEG, BARBRA requiring dialysis, pA.fib, acute CVA, septic shock from Pseudomonas bacteremia, hemoptysis, GI bleed     Transferred to  5/16        Admitted for:     Acute hypoxic resp failure   Sepsis due to UTI, bacterial pneumonia   Severe protein calorie malnutrition     Course complicated by pleural effusions requiring b/l chest tubes     Tracheostomy tubes changed on 5/22 and 5/31       Plan:     Neuro - daily reorientation, PT, OT, OOB, continue Seroquel     CV - continue Plavix, BP stable on midodrine, HR stable on Amio, not on AC due to recent GI bleed     Resp - minimal secretions, tolerating trach collar via cuffless trach for days, I decannulated him today and he is doing fine, educated patient and son on post-tracheostomy stoma care     GI - continue dysphagia diet, PEG can be removed at rehab in a few weeks     ID - finished abx courses, no fever, WBC normal     Renal - fn and lytes stable    DVT ppx - sc heparin       Discharge planning to rehab, d/w SW       Son updated at bedside

## 2023-06-05 NOTE — CHART NOTE - NSCHARTNOTEFT_GEN_A_CORE
Existing #6 cuffless trach removed without difficulty, site cleaned with alcohol swab and clean dressing applied, patient tolerated procedure well, son updated

## 2023-06-05 NOTE — PROGRESS NOTE ADULT - SUBJECTIVE AND OBJECTIVE BOX
Patient is a 77y old  Male who presents with a chief complaint of Transfer from [facility] (04 Jun 2023 12:07)    24 hour events:     Allergies    No Known Allergies    Intolerances      REVIEW OF SYSTEMS: SEE BELOW       ICU Vital Signs Last 24 Hrs  T(C): 36.6 (04 Jun 2023 21:14), Max: 37.6 (04 Jun 2023 16:00)  T(F): 97.9 (04 Jun 2023 21:14), Max: 99.6 (04 Jun 2023 16:00)  HR: 104 (05 Jun 2023 11:00) (95 - 112)  BP: 95/58 (05 Jun 2023 11:00) (86/55 - 112/66)  BP(mean): 67 (05 Jun 2023 11:00) (60 - 76)  ABP: --  ABP(mean): --  RR: 26 (05 Jun 2023 11:00) (15 - 34)  SpO2: 100% (05 Jun 2023 11:00) (97% - 100%)        CAPILLARY BLOOD GLUCOSE          I&O's Summary    04 Jun 2023 07:01  -  05 Jun 2023 07:00  --------------------------------------------------------  IN: 1040 mL / OUT: 0 mL / NET: 1040 mL            MEDICATIONS  (STANDING):  aMIOdarone    Tablet 200 milliGRAM(s) Oral daily  ascorbic acid 500 milliGRAM(s) Oral daily  cadexomer iodine 0.9% Gel 1 Application(s) Topical daily  chlorhexidine 4% Liquid 1 Application(s) Topical <User Schedule>  cholecalciferol 2000 Unit(s) Oral daily  clopidogrel Tablet 75 milliGRAM(s) Enteral Tube daily  collagenase Ointment 1 Application(s) Topical daily  heparin   Injectable 5000 Unit(s) SubCutaneous every 12 hours  melatonin 5 milliGRAM(s) Oral at bedtime  midodrine 10 milliGRAM(s) Oral every 8 hours  nystatin Powder 1 Application(s) Topical two times a day  pantoprazole  Injectable 40 milliGRAM(s) IV Push daily  QUEtiapine 50 milliGRAM(s) Oral at bedtime  thiamine 100 milliGRAM(s) Oral daily      MEDICATIONS  (PRN):      PHYSICAL EXAM: SEE BELOW                          8.4    7.86  )-----------( 327      ( 05 Jun 2023 06:13 )             26.4       06-05    137  |  102  |  19  ----------------------------<  95  3.8   |  29  |  0.68    Ca    8.5      05 Jun 2023 06:13  Phos  4.4     06-05  Mg     1.7     06-05

## 2023-06-06 ENCOUNTER — TRANSCRIPTION ENCOUNTER (OUTPATIENT)
Age: 78
End: 2023-06-06

## 2023-06-06 VITALS
HEART RATE: 109 BPM | SYSTOLIC BLOOD PRESSURE: 108 MMHG | DIASTOLIC BLOOD PRESSURE: 57 MMHG | RESPIRATION RATE: 18 BRPM | OXYGEN SATURATION: 98 %

## 2023-06-06 LAB — SARS-COV-2 RNA SPEC QL NAA+PROBE: SIGNIFICANT CHANGE UP

## 2023-06-06 PROCEDURE — 99239 HOSP IP/OBS DSCHRG MGMT >30: CPT

## 2023-06-06 RX ORDER — COLLAGENASE CLOSTRIDIUM HIST. 250 UNIT/G
1 OINTMENT (GRAM) TOPICAL
Qty: 0 | Refills: 0 | DISCHARGE
Start: 2023-06-06

## 2023-06-06 RX ORDER — QUETIAPINE FUMARATE 200 MG/1
1 TABLET, FILM COATED ORAL
Qty: 0 | Refills: 0 | DISCHARGE
Start: 2023-06-06

## 2023-06-06 RX ORDER — THIAMINE MONONITRATE (VIT B1) 100 MG
1 TABLET ORAL
Qty: 0 | Refills: 0 | DISCHARGE
Start: 2023-06-06

## 2023-06-06 RX ORDER — CLOPIDOGREL BISULFATE 75 MG/1
1 TABLET, FILM COATED ORAL
Qty: 0 | Refills: 0 | DISCHARGE
Start: 2023-06-06

## 2023-06-06 RX ORDER — NYSTATIN CREAM 100000 [USP'U]/G
1 CREAM TOPICAL
Qty: 0 | Refills: 0 | DISCHARGE
Start: 2023-06-06

## 2023-06-06 RX ORDER — LANOLIN ALCOHOL/MO/W.PET/CERES
3 CREAM (GRAM) TOPICAL AT BEDTIME
Refills: 0 | Status: DISCONTINUED | OUTPATIENT
Start: 2023-06-06 | End: 2023-06-06

## 2023-06-06 RX ORDER — CHOLECALCIFEROL (VITAMIN D3) 125 MCG
2000 CAPSULE ORAL
Qty: 0 | Refills: 0 | DISCHARGE
Start: 2023-06-06

## 2023-06-06 RX ORDER — QUETIAPINE FUMARATE 200 MG/1
25 TABLET, FILM COATED ORAL AT BEDTIME
Refills: 0 | Status: DISCONTINUED | OUTPATIENT
Start: 2023-06-06 | End: 2023-06-06

## 2023-06-06 RX ORDER — CADEXOMER IODINE 0.9 %
1 PADS, MEDICATED (EA) TOPICAL
Qty: 0 | Refills: 0 | DISCHARGE
Start: 2023-06-06

## 2023-06-06 RX ORDER — AMIODARONE HYDROCHLORIDE 400 MG/1
1 TABLET ORAL
Qty: 0 | Refills: 0 | DISCHARGE
Start: 2023-06-06

## 2023-06-06 RX ORDER — ASCORBIC ACID 60 MG
1 TABLET,CHEWABLE ORAL
Qty: 0 | Refills: 0 | DISCHARGE
Start: 2023-06-06

## 2023-06-06 RX ORDER — MIDODRINE HYDROCHLORIDE 2.5 MG/1
1 TABLET ORAL
Qty: 0 | Refills: 0 | DISCHARGE
Start: 2023-06-06

## 2023-06-06 RX ADMIN — Medication 500 MILLIGRAM(S): at 11:29

## 2023-06-06 RX ADMIN — NYSTATIN CREAM 1 APPLICATION(S): 100000 CREAM TOPICAL at 11:29

## 2023-06-06 RX ADMIN — Medication 1 APPLICATION(S): at 11:30

## 2023-06-06 RX ADMIN — Medication 1 APPLICATION(S): at 11:29

## 2023-06-06 RX ADMIN — MIDODRINE HYDROCHLORIDE 10 MILLIGRAM(S): 2.5 TABLET ORAL at 05:44

## 2023-06-06 RX ADMIN — HEPARIN SODIUM 5000 UNIT(S): 5000 INJECTION INTRAVENOUS; SUBCUTANEOUS at 11:28

## 2023-06-06 RX ADMIN — CLOPIDOGREL BISULFATE 75 MILLIGRAM(S): 75 TABLET, FILM COATED ORAL at 11:28

## 2023-06-06 RX ADMIN — Medication 100 MILLIGRAM(S): at 11:28

## 2023-06-06 RX ADMIN — Medication 2000 UNIT(S): at 11:28

## 2023-06-06 RX ADMIN — AMIODARONE HYDROCHLORIDE 200 MILLIGRAM(S): 400 TABLET ORAL at 11:28

## 2023-06-06 RX ADMIN — MIDODRINE HYDROCHLORIDE 10 MILLIGRAM(S): 2.5 TABLET ORAL at 14:51

## 2023-06-06 NOTE — DISCHARGE NOTE PROVIDER - CARE PROVIDER_API CALL
Farhan Bailey  Internal Medicine  [id number] [address], [address] [address]  Phone: [phone]  Fax: [phone]  Established Patient  Follow Up Time: 2 weeks    Samuel Greene  Cardiovascular Disease  [address] [place] [address], 4th [place] [address]  [place] [address]  Phone: [phone]  Fax: [phone]  Established Patient  Follow Up Time: 2 weeks

## 2023-06-06 NOTE — DISCHARGE NOTE PROVIDER - HOSPITAL COURSE
76 y/o male with PMH HTN, HLD, DM2, was visiting family in [place], developed a STEMI and required hospitalization at [facility] [place], operated by [facility]. He underwent PCI of LCx and distal LAD. He was then transferred to [facility] for mechanical cardiac support for cardiogenic shock. His course was complicated by respiratory failure requiring tracheostomy, dysphagia needing PEG, paroxysmal A.fib, massive hemoptysis and GI bleed (not currently on AC), CVA, and sacral and other pressure wounds.     He was transferred to [facility] on 5/16/23 where he was treated for gram negative pneumonia and UTI. He required b/l chest tubes for pleural effusions. He improved from resp standpoint and stayed off the vent through most of his stay at [place], he was subsequently decannulated on 6/5 and tolerated well. He is eating by mouth and still has the PEG in - can be discontinued at a later date. His wounds are improving. He has been getting OOB with PT. He finished all his antibiotic courses. He has remained in NSR on amiodarone and is not on AC due to bleeding risk. He requires midodrine to maintain normal BP. He is on Plavix for CAD, PCI.     He is stable for discharge to [facility] rehab today, he will need to f/u with his PCP and cardiologist in 2-3 weeks, his son is updated on the discharge plan.

## 2023-06-06 NOTE — PROGRESS NOTE ADULT - SUBJECTIVE AND OBJECTIVE BOX
Patient is a 77y old  Male who presents with a chief complaint of Transfer from [facility] (05 Jun 2023 11:50)    24 hour events:     Allergies    No Known Allergies    Intolerances      REVIEW OF SYSTEMS: SEE BELOW       ICU Vital Signs Last 24 Hrs  T(C): 36.2 (06 Jun 2023 04:00), Max: 36.7 (05 Jun 2023 21:28)  T(F): 97.1 (06 Jun 2023 04:00), Max: 98.1 (05 Jun 2023 21:28)  HR: 101 (06 Jun 2023 05:00) (98 - 112)  BP: 95/57 (06 Jun 2023 05:00) (93/49 - 115/62)  BP(mean): 66 (06 Jun 2023 05:00) (60 - 75)  ABP: --  ABP(mean): --  RR: 23 (06 Jun 2023 05:00) (16 - 35)  SpO2: 100% (06 Jun 2023 05:00) (91% - 100%)    O2 Parameters below as of 05 Jun 2023 20:00  Patient On (Oxygen Delivery Method): nasal cannula  O2 Flow (L/min): 2          CAPILLARY BLOOD GLUCOSE          I&O's Summary          MEDICATIONS  (STANDING):  aMIOdarone    Tablet 200 milliGRAM(s) Oral daily  ascorbic acid 500 milliGRAM(s) Oral daily  cadexomer iodine 0.9% Gel 1 Application(s) Topical daily  chlorhexidine 4% Liquid 1 Application(s) Topical <User Schedule>  cholecalciferol 2000 Unit(s) Oral daily  clopidogrel Tablet 75 milliGRAM(s) Enteral Tube daily  collagenase Ointment 1 Application(s) Topical daily  heparin   Injectable 5000 Unit(s) SubCutaneous every 12 hours  midodrine 10 milliGRAM(s) Oral every 8 hours  nystatin Powder 1 Application(s) Topical two times a day  QUEtiapine 25 milliGRAM(s) Oral at bedtime  thiamine 100 milliGRAM(s) Oral daily      MEDICATIONS  (PRN):  melatonin 3 milliGRAM(s) Oral at bedtime PRN Insomnia      PHYSICAL EXAM: SEE BELOW                          8.4    7.86  )-----------( 327      ( 05 Jun 2023 06:13 )             26.4       06-05    137  |  102  |  19  ----------------------------<  95  3.8   |  29  |  0.68    Ca    8.5      05 Jun 2023 06:13  Phos  4.4     06-05  Mg     1.7     06-05

## 2023-06-06 NOTE — PROGRESS NOTE ADULT - TIME BILLING
coordinating care with ICU RN, ICU PA, counseling son, reviewing labs and prior records, personally reviewing and interpreting imaging, documenting findings and assessment in the medical record.
coordinating care with ICU RN, PA, respiratory therapist, reviewing labs and prior records, personally reviewing and interpreting imaging, documenting findings and assessment in the medical record.
see above
.
.

## 2023-06-06 NOTE — DISCHARGE NOTE PROVIDER - NSDCCPCAREPLAN_GEN_ALL_CORE_FT
PRINCIPAL DISCHARGE DIAGNOSIS  Diagnosis: Acute respiratory failure with hypoxia  Assessment and Plan of Treatment:

## 2023-06-06 NOTE — DISCHARGE NOTE PROVIDER - PROVIDER TOKENS
PROVIDER:[TOKEN:[[id number]],FOLLOWUP:[2 weeks],ESTABLISHEDPATIENT:[T]],PROVIDER:[TOKEN:[[id number]],FOLLOWUP:[2 weeks],ESTABLISHEDPATIENT:[T]]

## 2023-06-06 NOTE — PROGRESS NOTE ADULT - RESPIRATORY
airway patent/good air movement/respirations non-labored
airway patent/good air movement/respirations non-labored
clear to auscultation bilaterally/no wheezes/no respiratory distress
clear to auscultation bilaterally/no wheezes/no rales/no rhonchi
clear to auscultation bilaterally/no wheezes/no respiratory distress
no wheezes/no respiratory distress/rales
clear to auscultation bilaterally/no wheezes/no rales/no rhonchi
clear to auscultation bilaterally/no wheezes/no rales/no rhonchi
airway patent/good air movement/respirations non-labored
airway patent/good air movement/respirations non-labored

## 2023-06-06 NOTE — PROGRESS NOTE ADULT - NUTRITIONAL ASSESSMENT
This patient has been assessed with a concern for Malnutrition and has been determined to have a diagnosis/diagnoses of Severe protein-calorie malnutrition.    This patient is being managed with:   Diet NPO with Tube Feed-  Tube Feeding Modality: Gastrostomy  Nepro with Carb Steady (NEPRORTH)  Total Volume for 24 Hours (mL): 1200  Continuous  Starting Tube Feed Rate {mL per Hour}: 30  Increase Tube Feed Rate by (mL): 10     Every 4 hours  Until Goal Tube Feed Rate (mL per Hour): 50  Tube Feed Duration (in Hours): 24  Tube Feed Start Time: 00:00  Free Water Flush  Free Water Flush Instructions:  Free water flushes per CCU intensivist  No Carb Prosource TF     Qty per Day:  1  Entered: May 24 2023 11:46AM  
This patient has been assessed with a concern for Malnutrition and has been determined to have a diagnosis/diagnoses of Severe protein-calorie malnutrition.    This patient is being managed with:   Diet NPO-  Tube Feeding Modality: Gastrostomy  Nepro with Carb Steady (NEPRORTH)  Continuous  Until Goal Tube Feed Rate (mL per Hour): 50  Tube Feed Duration (in Hours): 24  Tube Feed Start Time: 09:30  Bolus   Total Volume per Flush (mL): 300   Frequency: Every 6 Hours  Entered: May 29 2023  9:24AM  
This patient has been assessed with a concern for Malnutrition and has been determined to have a diagnosis/diagnoses of Severe protein-calorie malnutrition.    This patient is being managed with:   Diet Pureed-  Moderately Thick Liquids (MODTHICKLIQS)  Entered: May 31 2023  2:40PM  
This patient has been assessed with a concern for Malnutrition and has been determined to have a diagnosis/diagnoses of Severe protein-calorie malnutrition.    This patient is being managed with:   Diet NPO with Tube Feed-  Tube Feeding Modality: Gastrostomy  Nepro with Carb Steady (NEPRORTH)  Total Volume for 24 Hours (mL): 1200  Continuous  Starting Tube Feed Rate {mL per Hour}: 30  Increase Tube Feed Rate by (mL): 10     Every 4 hours  Until Goal Tube Feed Rate (mL per Hour): 50  Tube Feed Duration (in Hours): 24  Tube Feed Start Time: 00:00  Free Water Flush  Free Water Flush Instructions:  Free water flushes per CCU intensivist  No Carb Prosource TF     Qty per Day:  1  Entered: May 24 2023 11:46AM  
This patient has been assessed with a concern for Malnutrition and has been determined to have a diagnosis/diagnoses of Severe protein-calorie malnutrition.    This patient is being managed with:   Diet NPO-  Tube Feeding Modality: Gastrostomy  Nepro with Carb Steady (NEPRORTH)  Continuous  Until Goal Tube Feed Rate (mL per Hour): 50  Tube Feed Duration (in Hours): 24  Tube Feed Start Time: 09:30  Bolus   Total Volume per Flush (mL): 300   Frequency: Every 6 Hours  Entered: May 29 2023  9:24AM  
This patient has been assessed with a concern for Malnutrition and has been determined to have a diagnosis/diagnoses of Severe protein-calorie malnutrition.    This patient is being managed with:   Diet NPO with Tube Feed-  Tube Feeding Modality: Gastrostomy  Nepro with Carb Steady (NEPRORTH)  Total Volume for 24 Hours (mL): 1200  Continuous  Starting Tube Feed Rate {mL per Hour}: 30  Increase Tube Feed Rate by (mL): 10     Every 4 hours  Until Goal Tube Feed Rate (mL per Hour): 50  Tube Feed Duration (in Hours): 24  Tube Feed Start Time: 00:00  Free Water Flush  Free Water Flush Instructions:  Free water flushes per CCU intensivist  No Carb Prosource TF     Qty per Day:  1  Entered: May 24 2023 11:46AM  
This patient has been assessed with a concern for Malnutrition and has been determined to have a diagnosis/diagnoses of Severe protein-calorie malnutrition.    This patient is being managed with:   Diet NPO with Tube Feed-  Tube Feeding Modality: Gastrostomy  Vital 1.5 [place] (VITAL1.5)  Total Volume for 24 Hours (mL): 1320  Continuous  Until Goal Tube Feed Rate (mL per Hour): 55  Tube Feed Duration (in Hours): 24  Tube Feed Start Time: 12:00  Free Water Flush  Free Water Flush Instructions:  consider free water flushes to maintain hydration as per MD  No Carb Prosource TF     Qty per Day:  2  Entered: May 17 2023 11:58AM  
This patient has been assessed with a concern for Malnutrition and has been determined to have a diagnosis/diagnoses of Severe protein-calorie malnutrition.    This patient is being managed with:   Diet Regular-  Minced and Moist (SURJITMOIST)  Entered: Akshat  3 2023  3:22PM  
This patient has been assessed with a concern for Malnutrition and has been determined to have a diagnosis/diagnoses of Severe protein-calorie malnutrition.    This patient is being managed with:   Diet NPO with Tube Feed-  Tube Feeding Modality: Gastrostomy  Nepro with Carb Steady (NEPRORTH)  Total Volume for 24 Hours (mL): 1200  Continuous  Starting Tube Feed Rate {mL per Hour}: 30  Increase Tube Feed Rate by (mL): 10     Every 4 hours  Until Goal Tube Feed Rate (mL per Hour): 50  Tube Feed Duration (in Hours): 24  Tube Feed Start Time: 00:00  Free Water Flush  Free Water Flush Instructions:  Free water flushes per CCU intensivist  No Carb Prosource TF     Qty per Day:  1  Entered: May 24 2023 11:46AM  
This patient has been assessed with a concern for Malnutrition and has been determined to have a diagnosis/diagnoses of Severe protein-calorie malnutrition.    This patient is being managed with:   Diet NPO with Tube Feed-  Tube Feeding Modality: Gastrostomy  Vital 1.5 [place] (VITAL1.5)  Total Volume for 24 Hours (mL): 1320  Continuous  Until Goal Tube Feed Rate (mL per Hour): 55  Tube Feed Duration (in Hours): 24  Tube Feed Start Time: 12:00  Free Water Flush  Free Water Flush Instructions:  consider free water flushes to maintain hydration as per MD  No Carb Prosource TF     Qty per Day:  2  Entered: May 17 2023 11:58AM  
This patient has been assessed with a concern for Malnutrition and has been determined to have a diagnosis/diagnoses of Severe protein-calorie malnutrition.    This patient is being managed with:   Diet Pureed-  Moderately Thick Liquids (MODTHICKLIQS)  Entered: May 31 2023  2:40PM  
This patient has been assessed with a concern for Malnutrition and has been determined to have a diagnosis/diagnoses of Severe protein-calorie malnutrition.    This patient is being managed with:   Diet Pureed-  Moderately Thick Liquids (MODTHICKLIQS)  Tube Feeding Modality: Gastrostomy  Nepro with Carb Steady (NEPRORTH)  Total Volume for 24 Hours (mL): 1200  Continuous  Starting Tube Feed Rate {mL per Hour}: 30  Increase Tube Feed Rate by (mL): 20     Every 4 hours  Until Goal Tube Feed Rate (mL per Hour): 50  Tube Feed Duration (in Hours): 24  Tube Feed Start Time: 00:00  Free Water Flush  Bolus   Total Volume per Flush (mL): 300   Frequency: Every 6 Hours  Entered: Jun 1 2023  2:36PM  
This patient has been assessed with a concern for Malnutrition and has been determined to have a diagnosis/diagnoses of Severe protein-calorie malnutrition.  
This patient has been assessed with a concern for Malnutrition and has been determined to have a diagnosis/diagnoses of Severe protein-calorie malnutrition.    This patient is being managed with:   Diet NPO with Tube Feed-  Tube Feeding Modality: Gastrostomy  Nepro with Carb Steady (NEPRORTH)  Total Volume for 24 Hours (mL): 1200  Continuous  Starting Tube Feed Rate {mL per Hour}: 30  Increase Tube Feed Rate by (mL): 10     Every 4 hours  Until Goal Tube Feed Rate (mL per Hour): 50  Tube Feed Duration (in Hours): 24  Tube Feed Start Time: 00:00  Free Water Flush  Free Water Flush Instructions:  Free water flushes per CCU intensivist  No Carb Prosource TF     Qty per Day:  1  Entered: May 24 2023 11:46AM  
This patient has been assessed with a concern for Malnutrition and has been determined to have a diagnosis/diagnoses of Severe protein-calorie malnutrition.    This patient is being managed with:   Diet NPO with Tube Feed-  Tube Feeding Modality: Gastrostomy  Nepro with Carb Steady (NEPRORTH)  Total Volume for 24 Hours (mL): 1200  Continuous  Starting Tube Feed Rate {mL per Hour}: 30  Increase Tube Feed Rate by (mL): 10     Every 4 hours  Until Goal Tube Feed Rate (mL per Hour): 50  Tube Feed Duration (in Hours): 24  Tube Feed Start Time: 00:00  Free Water Flush  Free Water Flush Instructions:  Free water flushes per CCU intensivist  No Carb Prosource TF     Qty per Day:  1  Entered: May 24 2023 11:46AM  
This patient has been assessed with a concern for Malnutrition and has been determined to have a diagnosis/diagnoses of Severe protein-calorie malnutrition.  
This patient has been assessed with a concern for Malnutrition and has been determined to have a diagnosis/diagnoses of Severe protein-calorie malnutrition.    This patient is being managed with:   Diet NPO with Tube Feed-  Tube Feeding Modality: Gastrostomy  Vital 1.5 [place] (VITAL1.5)  Total Volume for 24 Hours (mL): 1320  Continuous  Until Goal Tube Feed Rate (mL per Hour): 55  Tube Feed Duration (in Hours): 24  Tube Feed Start Time: 12:00  Free Water Flush  Free Water Flush Instructions:  consider free water flushes to maintain hydration as per MD  No Carb Prosource TF     Qty per Day:  2  Entered: May 17 2023 11:58AM  
This patient has been assessed with a concern for Malnutrition and has been determined to have a diagnosis/diagnoses of Severe protein-calorie malnutrition.    This patient is being managed with:   Diet Regular-  Entered: Jun 5 2023 12:45PM  
This patient has been assessed with a concern for Malnutrition and has been determined to have a diagnosis/diagnoses of Severe protein-calorie malnutrition.    This patient is being managed with:   Diet Regular-  Minced and Moist (SURJITMOIST)  Entered: Akshat  3 2023  3:22PM

## 2023-06-06 NOTE — PROGRESS NOTE ADULT - PROVIDER SPECIALTY LIST ADULT
Critical Care
Infectious Disease
Nephrology
Nephrology
Podiatry
Critical Care
Infectious Disease
Infectious Disease
MICU
MICU
Orthopedics
Podiatry
Critical Care
Critical Care
Infectious Disease
Infectious Disease
Nephrology
Podiatry
Urology
Urology
Critical Care
Infectious Disease
Nephrology
Podiatry
Podiatry
Critical Care
Podiatry
Podiatry
Critical Care
Critical Care
Podiatry
Critical Care

## 2023-06-06 NOTE — PROGRESS NOTE ADULT - ASSESSMENT
76 y/o male with PMH HTN, HLD, DM2, recent prolonged hospital course in [place], [place] after a IWMI (underwent PCI with MEDINA to LCx) complicated by cardiogenic shock requiring ECMO, acute hypoxic resp failure requiring trach, dysphagia requiring PEG, BARBRA requiring dialysis, pA.fib, acute CVA, septic shock from Pseudomonas bacteremia, hemoptysis, GI bleed     Transferred to  5/16        Admitted for:     Acute hypoxic resp failure   Sepsis due to UTI, bacterial pneumonia   Severe protein calorie malnutrition     Course complicated by pleural effusions requiring b/l chest tubes     Tracheostomy tubes changed on 5/22 and 5/31   Trach removed 6/5       Plan:     Neuro - daily reorientation, PT, OT, OOB, decrease Seroquel, change melatonin to prn     CV - continue Plavix, BP stable on midodrine, HR stable on Amio, not on AC due to recent GI bleed     Resp - tolerated trach decannulation on 6/5, saturating fine on 1-2L NC, educated patient and son on post-tracheostomy stoma care     GI - continue dysphagia diet, PEG can be removed at rehab in a few weeks     ID - finished abx courses, no fever, WBC normal     Renal - fn and lytes stable    DVT ppx - sc heparin       Discharge to rehab when arrangements made

## 2023-06-06 NOTE — PROGRESS NOTE ADULT - CONSTITUTIONAL
no distress/cachectic
cachectic
cachectic
no distress
no distress
cachectic
no distress
no distress/cachectic
no distress
no distress/cachectic

## 2023-06-06 NOTE — PROGRESS NOTE ADULT - GASTROINTESTINAL COMMENTS
PEG in place
PEG in place, site clear
tolerating feeds, PO intake to start
rectal tube in place
rectal tube replaced- still with diarrhea
PEG in place

## 2023-06-06 NOTE — PROGRESS NOTE ADULT - REASON FOR ADMISSION
Transfer from [facility]
Transfer from [facility]
Transfer from [facility]
Transfer from [facility]
Transfer from [facility]
Transfer from [facility]
Transfer from [facility]
Transfer from [facility]
Transfer from [facility]
Transfer [facility]
Transfer from [facility]
Transfer from [facility]
Transfer from [facility] from [facility]
Transfer from [facility]
Transfer from [facility]
Transfer from [facility]
Transfer from [facility]
Transfer from [facility]
Transfer from [facility]
Transfer from [facility]
Transfer [facility]
Transfer from [facility]
Transfer from [facility]
Transfer from [facility]
Transfer from [facility]
Transfer from [facility]
Transfer [facility]
Transfer from [facility]
Transfer from [facility]
Transfer from [facility]
Transfer from [facility]
Transfer from [facility]
Transfer from [facility]
Transfer from [facility]
Transfer from [facility]
Transfer from [facility]
Transfer from [facility]
Transfer from [facility]
Transfer from [facility]
Transfer from [facility]
Transfer from [facility]
Transfer from [facility]
Transfer from [facility]
Transfer from [facility]
Transfer from [facility]
Transfer from [facility]
Transfer from [facility]
Transfer from [facility]
Transfer from [facility]
Transfer from [facility]

## 2023-06-06 NOTE — DISCHARGE NOTE PROVIDER - ATTENDING DISCHARGE PHYSICAL EXAMINATION:
T(C): 37 (06-06-23 @ 08:53), Max: 37 (06-06-23 @ 08:53)  HR: 101 (06-06-23 @ 05:00) (100 - 112)  BP: 95/57 (06-06-23 @ 05:00) (93/49 - 115/62)  RR: 23 (06-06-23 @ 05:00) (16 - 35)  SpO2: 100% (06-06-23 @ 05:00) (91% - 100%)    PHYSICAL EXAM  General: NAD, cachectic  CNS: AAOx3, no focal deficit   HEENT: PERRL, trach site open, no active discharge  Resp: clear b/l, good AE   CVS: S1S2 regular   Abd: soft, NT, ND, +BS  Ext: no edema   Skin: warm                           8.4    7.86  )-----------( 327      ( 05 Jun 2023 06:13 )             26.4       06-05    137  |  102  |  19  ----------------------------<  95  3.8   |  29  |  0.68    Ca    8.5      05 Jun 2023 06:13  Phos  4.4     06-05  Mg     1.7     06-05    < from: Transthoracic Echocardiogram Follow Up (05.23.23 @ 09:53) >    Findings     Left Atrium   Normal appearing left atrium.     Left Ventricle   Limited study to assess left ventricular function.   The left ventricle is normal in size and wall thickness.   Mid inferoseptum appears mildly hypokinetic.   Estimated left ventricular ejection fraction is 55-60 %.     Pericardial Effusion   Trace pericardial effusion is present.     Pleural Effusion   Pleural effusion - is present.     Impression     Summary     Trace pericardial effusion is present.   Limited study to assess left ventricular function.   The left ventricle is normal in size and wall thickness.   Mid inferoseptum appears mildly hypokinetic.   Estimated left ventricular ejection fraction is 55-60 %.   Pleural effusion - is present.     Signature     ----------------------------------------------------------------   Electronically signed by Miracle Brewer MD(Interpreting   physician) on 05/23/2023 11:25 AM   ----------------------------------------------------------------    < end of copied text >

## 2023-06-06 NOTE — DISCHARGE NOTE PROVIDER - NSDCMRMEDTOKEN_GEN_ALL_CORE_FT
amiodarone 200 mg oral tablet: 1 tab(s) orally once a day  ascorbic acid 500 mg oral tablet: 1 tab(s) orally once a day  cadexomer iodine 0.9% topical gel: 1 Apply topically to affected area once a day  cholecalciferol oral tablet: 2000 unit(s) orally once a day  clopidogrel 75 mg oral tablet: 1 tab(s) orally once a day  collagenase 250 units/g topical ointment: 1 Apply topically to affected area once a day  midodrine 10 mg oral tablet: 1 tab(s) orally every 8 hours  nystatin 100,000 units/g topical powder: 1 Apply topically to affected area 2 times a day  QUEtiapine 25 mg oral tablet: 1 tab(s) orally once a day (at bedtime)  thiamine 100 mg oral tablet: 1 tab(s) orally once a day

## 2023-06-07 PROBLEM — R57.0 CARDIOGENIC SHOCK: Chronic | Status: ACTIVE | Noted: 2023-05-16

## 2023-06-07 PROBLEM — I21.3 ST ELEVATION (STEMI) MYOCARDIAL INFARCTION OF UNSPECIFIED SITE: Chronic | Status: ACTIVE | Noted: 2023-05-16

## 2023-06-07 PROBLEM — I63.9 CEREBRAL INFARCTION, UNSPECIFIED: Chronic | Status: ACTIVE | Noted: 2023-05-16

## 2023-06-07 PROBLEM — Z87.09 PERSONAL HISTORY OF OTHER DISEASES OF THE RESPIRATORY SYSTEM: Chronic | Status: ACTIVE | Noted: 2023-05-16

## 2023-06-21 DIAGNOSIS — Z16.19 RESISTANCE TO OTHER SPECIFIED BETA LACTAM ANTIBIOTICS: ICD-10-CM

## 2023-06-21 DIAGNOSIS — Z99.2 DEPENDENCE ON RENAL DIALYSIS: ICD-10-CM

## 2023-06-21 DIAGNOSIS — E87.5 HYPERKALEMIA: ICD-10-CM

## 2023-06-21 DIAGNOSIS — I25.2 OLD MYOCARDIAL INFARCTION: ICD-10-CM

## 2023-06-21 DIAGNOSIS — G47.00 INSOMNIA, UNSPECIFIED: ICD-10-CM

## 2023-06-21 DIAGNOSIS — Z95.5 PRESENCE OF CORONARY ANGIOPLASTY IMPLANT AND GRAFT: ICD-10-CM

## 2023-06-21 DIAGNOSIS — Z93.0 TRACHEOSTOMY STATUS: ICD-10-CM

## 2023-06-21 DIAGNOSIS — D64.9 ANEMIA, UNSPECIFIED: ICD-10-CM

## 2023-06-21 DIAGNOSIS — A41.51 SEPSIS DUE TO ESCHERICHIA COLI [E. COLI]: ICD-10-CM

## 2023-06-21 DIAGNOSIS — I48.0 PAROXYSMAL ATRIAL FIBRILLATION: ICD-10-CM

## 2023-06-21 DIAGNOSIS — J15.6 PNEUMONIA DUE TO OTHER GRAM-NEGATIVE BACTERIA: ICD-10-CM

## 2023-06-21 DIAGNOSIS — R65.21 SEVERE SEPSIS WITH SEPTIC SHOCK: ICD-10-CM

## 2023-06-21 DIAGNOSIS — I25.10 ATHEROSCLEROTIC HEART DISEASE OF NATIVE CORONARY ARTERY WITHOUT ANGINA PECTORIS: ICD-10-CM

## 2023-06-21 DIAGNOSIS — B96.89 OTHER SPECIFIED BACTERIAL AGENTS AS THE CAUSE OF DISEASES CLASSIFIED ELSEWHERE: ICD-10-CM

## 2023-06-21 DIAGNOSIS — N39.0 URINARY TRACT INFECTION, SITE NOT SPECIFIED: ICD-10-CM

## 2023-06-21 DIAGNOSIS — N17.9 ACUTE KIDNEY FAILURE, UNSPECIFIED: ICD-10-CM

## 2023-06-21 DIAGNOSIS — N49.2 INFLAMMATORY DISORDERS OF SCROTUM: ICD-10-CM

## 2023-06-21 DIAGNOSIS — J90 PLEURAL EFFUSION, NOT ELSEWHERE CLASSIFIED: ICD-10-CM

## 2023-06-21 DIAGNOSIS — E43 UNSPECIFIED SEVERE PROTEIN-CALORIE MALNUTRITION: ICD-10-CM

## 2023-06-21 DIAGNOSIS — B96.5 PSEUDOMONAS (AERUGINOSA) (MALLEI) (PSEUDOMALLEI) AS THE CAUSE OF DISEASES CLASSIFIED ELSEWHERE: ICD-10-CM

## 2023-06-21 DIAGNOSIS — Z93.1 GASTROSTOMY STATUS: ICD-10-CM

## 2023-06-21 DIAGNOSIS — A41.59 OTHER GRAM-NEGATIVE SEPSIS: ICD-10-CM

## 2023-06-21 DIAGNOSIS — E11.9 TYPE 2 DIABETES MELLITUS WITHOUT COMPLICATIONS: ICD-10-CM

## 2023-06-21 DIAGNOSIS — I10 ESSENTIAL (PRIMARY) HYPERTENSION: ICD-10-CM

## 2023-06-21 DIAGNOSIS — J96.21 ACUTE AND CHRONIC RESPIRATORY FAILURE WITH HYPOXIA: ICD-10-CM

## 2023-06-21 DIAGNOSIS — Z87.19 PERSONAL HISTORY OF OTHER DISEASES OF THE DIGESTIVE SYSTEM: ICD-10-CM

## 2023-06-21 DIAGNOSIS — B96.20 UNSPECIFIED ESCHERICHIA COLI [E. COLI] AS THE CAUSE OF DISEASES CLASSIFIED ELSEWHERE: ICD-10-CM

## 2023-06-21 DIAGNOSIS — Z66 DO NOT RESUSCITATE: ICD-10-CM

## 2023-06-21 DIAGNOSIS — R13.10 DYSPHAGIA, UNSPECIFIED: ICD-10-CM

## 2023-06-21 DIAGNOSIS — A41.52 SEPSIS DUE TO PSEUDOMONAS: ICD-10-CM

## 2023-06-21 DIAGNOSIS — Z86.73 PERSONAL HISTORY OF TRANSIENT ISCHEMIC ATTACK (TIA), AND CEREBRAL INFARCTION WITHOUT RESIDUAL DEFICITS: ICD-10-CM

## 2023-06-21 DIAGNOSIS — N50.89 OTHER SPECIFIED DISORDERS OF THE MALE GENITAL ORGANS: ICD-10-CM

## 2023-06-21 DIAGNOSIS — E87.0 HYPEROSMOLALITY AND HYPERNATREMIA: ICD-10-CM

## 2023-06-21 DIAGNOSIS — L89.612 PRESSURE ULCER OF RIGHT HEEL, STAGE 2: ICD-10-CM

## 2023-06-24 LAB
CULTURE RESULTS: SIGNIFICANT CHANGE UP
SPECIMEN SOURCE: SIGNIFICANT CHANGE UP

## 2023-06-27 ENCOUNTER — APPOINTMENT (OUTPATIENT)
Dept: CARDIOLOGY | Facility: CLINIC | Age: 78
End: 2023-06-27

## 2023-07-06 NOTE — PROGRESS NOTE ADULT - SUBJECTIVE AND OBJECTIVE BOX
Received request via: Pharmacy    Was the patient seen in the last year in this department? Yes    Does the patient have an active prescription (recently filled or refills available) for medication(s) requested? No    Does the patient have snf Plus and need 100 day supply (blood pressure, diabetes and cholesterol meds only)? Yes, quantity updated to 100 days   Date of service: 05-24-23 @ 12:54    pt seen and examined  fevers down  on ventilatory support  on low dose pressor      ROS: unable to obtain d/t medical condition      MEDICATIONS  (STANDING):  aMIOdarone    Tablet 200 milliGRAM(s) Oral daily  ascorbic acid 500 milliGRAM(s) Oral daily  cadexomer iodine 0.9% Gel 1 Application(s) Topical daily  chlorhexidine 0.12% Liquid 15 milliLiter(s) Oral Mucosa every 12 hours  chlorhexidine 4% Liquid 1 Application(s) Topical <User Schedule>  cholecalciferol 2000 Unit(s) Oral daily  clopidogrel Tablet 75 milliGRAM(s) Enteral Tube daily  collagenase Ointment 1 Application(s) Topical daily  heparin   Injectable 5000 Unit(s) SubCutaneous every 12 hours  melatonin 5 milliGRAM(s) Oral at bedtime  meropenem  IVPB 1000 milliGRAM(s) IV Intermittent every 12 hours  norepinephrine Infusion 0.05 MICROgram(s)/kG/Min (4.99 mL/Hr) IV Continuous <Continuous>  nystatin Powder 1 Application(s) Topical two times a day  pantoprazole  Injectable 40 milliGRAM(s) IV Push daily  QUEtiapine 50 milliGRAM(s) Oral at bedtime  thiamine 100 milliGRAM(s) Oral daily    Vital Signs Last 24 Hrs  T(C): 36.2 (24 May 2023 04:20), Max: 37.2 (23 May 2023 16:00)  T(F): 97.2 (24 May 2023 04:20), Max: 99 (24 May 2023 00:48)  HR: 94 (24 May 2023 12:00) (66 - 113)  BP: 93/46 (24 May 2023 12:00) (93/46 - 135/75)  BP(mean): 56 (24 May 2023 12:00) (56 - 89)  RR: 18 (24 May 2023 12:00) (18 - 28)  SpO2: 100% (24 May 2023 12:00) (98% - 100%)    Parameters below as of 23 May 2023 21:00  Patient On (Oxygen Delivery Method): ventilator    O2 Concentration (%): 40    PE:  Constitutional: frail looking  HEENT: NC/AT, EOMI, PERRLA, conjunctivae clear; ears and nose atraumatic; pharynx benign + trach   Neck: supple; thyroid not palpable  Back: no tenderness  Respiratory: decreased breath sounds   Cardiovascular: S1S2 regular, no murmurs  Abdomen: soft, not tender, not distended, positive BS; liver and spleen WNL  Genitourinary: no suprapubic tenderness Scrotum skin edema and erythema with superficial skin excoriation  Lymphatic: no LN palpable  Musculoskeletal: no muscle tenderness, no joint swelling or tenderness  Extremities: no pedal edema R heel/R hallux wound  Neurological/ Psychiatric: moving all extremities  Skin: no rashes; no palpable lesions    Labs: all available labs reviewed                                     7.3    10.46 )-----------( 202      ( 24 May 2023 08:23 )             24.0     05-24    147<H>  |  115<H>  |  34<H>  ----------------------------<  84  4.6   |  24  |  0.92    Ca    8.5      24 May 2023 06:08  Phos  3.9     05-24  Mg     2.0     05-24          Culture - Sputum (05.16.23 @ 23:14)   Gram Stain:   Rare polymorphonuclear leukocytes per low power field   Few Squamous epithelial cells per low power field   Numerous Gram Negative Rods per oil power field  - Amikacin: S <=16  - Aztreonam: I 16  - Cefepime: S 8  - Ceftazidime: I 16  - Ciprofloxacin: S <=0.25  - Gentamicin: S 4  - Imipenem: S <=1  - Levofloxacin: S 1  - Meropenem: S <=1  - Piperacillin/Tazobactam: I 64  - Tobramycin: S <=2  Specimen Source: .Sputum Sputum  Culture Results:   Numerous Pseudomonas aeruginosa   Normal Respiratory Cristiane absent  Organism Identification: Pseudomonas aeruginosa  Organism: Pseudomonas aeruginosa  Method Type: MICCulture - Urine (05.16.23 @ 18:00)   Specimen Source: Clean Catch Clean Catch (Midstream)  Culture Results:   >100,000 CFU/ml Escherichia coli   >100,000 CFU/ml Klebsiella aerogenes (Previously Enterobacter)  Culture - Blood (05.16.23 @ 16:54)   Specimen Source: .Blood None  Culture Results:   No growth to date.    Radiology: all available radiological tests reviewed  < from: CT Chest No Cont (05.23.23 @ 10:18) >  ACC: [id number] EXAM:  CT CHEST   ORDERED BY: HANNAH ESTEVES     PROCEDURE DATE:  05/23/2023          INTERPRETATION:  INDICATION: Respiratory failure    TECHNIQUE: Volumetric images of the chest without intravenous contrast.   Maximum intensity projection images were generated.    COMPARISON: No prior chest CT.    FINDINGS:    LUNGS/AIRWAYS/PLEURA: Tracheostomy cannula tip in the proximal trachea.   Distention of the trachea by the balloon cuff. Mucus within the left   lower lobar and superior segmental bronchi. Symmetric large pleural   effusions and secondary passive atelectasis of lower lobes and the left   upper lobe. Apparent groundglass in both upper lobes at least in part due   to low lung volumes. There may be a component of pulmonaryedema.    LYMPH NODES/MEDIASTINUM: Unremarkable.    HEART/VASCULATURE: Dilated left atrium. No pericardial effusion. Coronary   artery calcifications. Normal caliber aorta.    UPPER ABDOMEN: Left renal cyst. Excreted contrast in the gallbladder.   Diffuse gallbladder wall thickening.    BONES/SOFT TISSUES: Unremarkable.      IMPRESSION:    Large pleural effusions. Possible mild pulmonary edema in the upper   lobes, limited assessment due to low lung volumes.    Distended trachea by tracheostomy cannula balloon cuff. Recommend   adjustment.    Nondistended thick walled gallbladder, favored to be due to gallbladder   wall edema, such as in the setting of volume overload. Consider right   upper quadrant ultrasound or nuclear medicine study to assess for   cholecystitis, if clinically appropriate.    < from: Xray Chest 1 View- PORTABLE-Routine (05.16.23 @ 20:23) >    ACC: [id number] EXAM:  XR CHEST PORTABLE ROUTINE 1V   ORDERED BY: RADHA AVALOS     PROCEDURE DATE:  05/16/2023          INTERPRETATION:  TIME OF EXAM: May 16, 2023 at 8:10 PM.    CLINICAL INFORMATION: Chronic respiratory failure. Tracheostomy.    COMPARISON:  None available    TECHNIQUE:   AP Portable chest x-ray.    INTERPRETATION:    The heart is not enlarged. The thoracic aorta is calcified.  Tracheostomy tube is in place.  Right IJ catheter with tip in the right atrium.  Right upper extremity PICC line. Tip obscured by right IJ catheter. The   line extends at least to the SVC.  There are bilateral perihilar opacities, more extensive on the right.  There are bilateral small pleural effusions with likely associated   passive atelectasis.  No pneumothorax is seen.  No acute bony abnormality is noted.      IMPRESSION:  Lines as above.    Bilateral perihilar opacities, more extensive on the right, that could be   due to pulmonary edema or multifocal pneumonia.    Bilateral small pleural effusions with likely associated passive   atelectasis.    < end of copied text >  < from: Xray Foot AP + Lateral + Oblique, Right (05.17.23 @ 15:36) >    ACC: [id number] EXAM:  XR FOOT COMP MIN 3 VIEWS RT   ORDERED BY: AMAN BAEZA     PROCEDURE DATE:  05/17/2023          INTERPRETATION:  RIGHT foot    CLINICAL INFORMATION: Hallux and heel wound TECHNIQUE: AP,lateral and   oblique views.    FINDINGS: Heel soft tissue subcutaneous air adjacent to intact calcaneal   tuberosity.  RIGHT hallux are radiographically intact.  Second and third hammertoe deformities.  Remaining osseous and joint structures of the RIGHT foot are   radiographicallyintact.  The bones and joint spaces are radiographically intact.  No fracture or dislocation.  Soft tissues unremarkable.    IMPRESSION:    Heel subcutaneous air without radiographic evidence of calcaneal   tuberosity osteolysis/osteomyelitis.  Firsthallux radiographically intact..  If osteomyelitis is clinically considered  despite conservative therapy,   and soft tissue / bone infection requires further assessment, follow-up   MRI recommended.    --- End of Report ---      < end of copied text >    Advanced directives addressed: full resuscitation

## 2024-02-24 NOTE — H&P ADULT - HISTORY OF PRESENT ILLNESS
78 yo male with hx of DM, HTN, HLD, who presented initially to an outside hospital in [place], [place] on 3/1/23 with chest pain and lightheadedness after doing yard work, found to have an inferiolateral ST elevations.  He was taken to the cath lab and underwent PCI with MEDINA placement to mLCX (Resolute Ever Hawthorne 4 x 26mm), dLAD (Resolute Ever [place]orne 2.5 x 15mm and 2.25 x 12mm), and Impella CP was placed.   Patient was cannulated for peripheral VA ECMO on 3/2 and decannulated on 3/9.  Impella was removed 3/10.  Hospital course was complicated by multiple watershed strokes, acute respiratory failure requiring tracheostomy, BARBRA requiring renal replacement therapy, upper GI bleed, massive hemoptysis originating from lingula requiring bronchial blocker and bronchial artery embolization, pseudomonas bacteremia and septic shock.    Patient was eventually transported from [facility] on 5/16 to [facility] CCU with a tracheostomy, PEG, tunneled right sided HD catheter, right sided PICC line, rectal tube, and ostomy bag over his penis for urine collection.     room air

## 2024-02-26 NOTE — PROCEDURE NOTE - NSPOSTPRCRAD_GEN_A_CORE
-- DO NOT REPLY / DO NOT REPLY ALL --  -- Message is from Engagement Center Operations (ECO) --    General Patient Message: Son Jose calling to inform clinical team that patient was admitted to [facility] approximately 2 weeks ago Seen by Dr. Alvarado for being unresponsive, low sugar, low Bp, Incontinent.etc Patient was admitted for a week and released to [facility] nursing home. Jose would like to make sure all information is documented in chart and Dr Desai is involved in care    Caller Information         Type Contact Phone/Fax    02/26/2024 01:41 PM CST Phone (Incoming) Jose Roach (Emergency Contact) [phone]          Alternative phone number: sister Katelyn Roach    Can a detailed message be left? Yes    Message Turnaround:     Is it Working Hours? Yes - Working Hours                     
chest tube in correct position
chest tube in correct position